# Patient Record
Sex: FEMALE | Race: WHITE | NOT HISPANIC OR LATINO | Employment: FULL TIME | URBAN - METROPOLITAN AREA
[De-identification: names, ages, dates, MRNs, and addresses within clinical notes are randomized per-mention and may not be internally consistent; named-entity substitution may affect disease eponyms.]

---

## 2017-01-01 DIAGNOSIS — F41.8 DEPRESSION WITH ANXIETY: ICD-10-CM

## 2017-01-03 RX ORDER — DULOXETIN HYDROCHLORIDE 60 MG/1
CAPSULE, DELAYED RELEASE ORAL
Qty: 30 CAPSULE | Refills: 0 | OUTPATIENT
Start: 2017-01-03

## 2017-01-03 RX ORDER — MONTELUKAST SODIUM 10 MG/1
TABLET ORAL
Qty: 30 TABLET | Refills: 0 | OUTPATIENT
Start: 2017-01-03

## 2017-02-06 DIAGNOSIS — F41.8 DEPRESSION WITH ANXIETY: ICD-10-CM

## 2017-02-06 RX ORDER — DULOXETIN HYDROCHLORIDE 60 MG/1
CAPSULE, DELAYED RELEASE ORAL
Qty: 30 CAPSULE | Refills: 0 | OUTPATIENT
Start: 2017-02-06

## 2017-02-07 ENCOUNTER — TELEPHONE (OUTPATIENT)
Dept: INTERNAL MEDICINE | Facility: CLINIC | Age: 47
End: 2017-02-07

## 2017-02-07 DIAGNOSIS — F41.8 DEPRESSION WITH ANXIETY: ICD-10-CM

## 2017-02-07 RX ORDER — DULOXETIN HYDROCHLORIDE 60 MG/1
60 CAPSULE, DELAYED RELEASE ORAL DAILY
Qty: 2 CAPSULE | Refills: 0 | Status: SHIPPED | OUTPATIENT
Start: 2017-02-07 | End: 2019-03-06 | Stop reason: SDUPTHER

## 2017-02-07 NOTE — TELEPHONE ENCOUNTER
----- Message from Whitney Laurent sent at 2/7/2017  2:50 PM EST -----  Regarding: REFILL  HAS AN APPT ON 2/9. SHE NEEDS A REFILL ON HER CYMBALTA 60MG UNTIL SHE CAN GET IN HERE FOR HER APPT. PHARMACY [address]

## 2018-01-23 DIAGNOSIS — F41.8 DEPRESSION WITH ANXIETY: ICD-10-CM

## 2018-01-24 RX ORDER — DULOXETIN HYDROCHLORIDE 60 MG/1
CAPSULE, DELAYED RELEASE ORAL
Qty: 30 CAPSULE | Refills: 0 | OUTPATIENT
Start: 2018-01-24

## 2019-03-06 ENCOUNTER — OFFICE VISIT (OUTPATIENT)
Dept: FAMILY MEDICINE CLINIC | Facility: CLINIC | Age: 49
End: 2019-03-06

## 2019-03-06 VITALS
SYSTOLIC BLOOD PRESSURE: 110 MMHG | HEART RATE: 84 BPM | BODY MASS INDEX: 37.93 KG/M2 | OXYGEN SATURATION: 97 % | RESPIRATION RATE: 18 BRPM | DIASTOLIC BLOOD PRESSURE: 70 MMHG | WEIGHT: 221 LBS | TEMPERATURE: 98.4 F

## 2019-03-06 DIAGNOSIS — J45.20 MILD INTERMITTENT ASTHMA WITHOUT COMPLICATION: Primary | ICD-10-CM

## 2019-03-06 DIAGNOSIS — N92.6 IRREGULAR MENSES: ICD-10-CM

## 2019-03-06 DIAGNOSIS — G56.22 NEURITIS OF LEFT ULNAR NERVE: ICD-10-CM

## 2019-03-06 DIAGNOSIS — Z23 NEED FOR 23-POLYVALENT PNEUMOCOCCAL POLYSACCHARIDE VACCINE: ICD-10-CM

## 2019-03-06 DIAGNOSIS — F41.8 DEPRESSION WITH ANXIETY: ICD-10-CM

## 2019-03-06 DIAGNOSIS — M23.352 INTERNAL DERANGEMENT OF LEFT KNEE INVOLVING POSTERIOR HORN OF LATERAL MENISCUS: ICD-10-CM

## 2019-03-06 DIAGNOSIS — Z12.39 BREAST CANCER SCREENING: ICD-10-CM

## 2019-03-06 DIAGNOSIS — F41.8 MIXED ANXIETY DEPRESSIVE DISORDER: ICD-10-CM

## 2019-03-06 DIAGNOSIS — F07.81 POST CONCUSSION SYNDROME: ICD-10-CM

## 2019-03-06 DIAGNOSIS — Z23 NEED FOR DIPHTHERIA-TETANUS-PERTUSSIS (TDAP) VACCINE: ICD-10-CM

## 2019-03-06 PROCEDURE — 90732 PPSV23 VACC 2 YRS+ SUBQ/IM: CPT | Performed by: FAMILY MEDICINE

## 2019-03-06 PROCEDURE — 36415 COLL VENOUS BLD VENIPUNCTURE: CPT | Performed by: FAMILY MEDICINE

## 2019-03-06 PROCEDURE — 90471 IMMUNIZATION ADMIN: CPT | Performed by: FAMILY MEDICINE

## 2019-03-06 PROCEDURE — 90472 IMMUNIZATION ADMIN EACH ADD: CPT | Performed by: FAMILY MEDICINE

## 2019-03-06 PROCEDURE — 90715 TDAP VACCINE 7 YRS/> IM: CPT | Performed by: FAMILY MEDICINE

## 2019-03-06 PROCEDURE — 99214 OFFICE O/P EST MOD 30 MIN: CPT | Performed by: FAMILY MEDICINE

## 2019-03-06 RX ORDER — ALBUTEROL SULFATE 90 UG/1
AEROSOL, METERED RESPIRATORY (INHALATION)
Qty: 1 INHALER | Refills: 11 | Status: SHIPPED | OUTPATIENT
Start: 2019-03-06 | End: 2020-03-20

## 2019-03-06 RX ORDER — FLUTICASONE PROPIONATE 50 MCG
1 SPRAY, SUSPENSION (ML) NASAL DAILY
Qty: 18.2 ML | Refills: 11 | Status: SHIPPED | OUTPATIENT
Start: 2019-03-06

## 2019-03-06 RX ORDER — DULOXETIN HYDROCHLORIDE 60 MG/1
60 CAPSULE, DELAYED RELEASE ORAL DAILY
Qty: 2 CAPSULE | Refills: 0 | Status: SHIPPED | OUTPATIENT
Start: 2019-03-06 | End: 2019-07-31 | Stop reason: SDUPTHER

## 2019-03-06 RX ORDER — MONTELUKAST SODIUM 10 MG/1
10 TABLET ORAL NIGHTLY
Qty: 90 TABLET | Refills: 3 | Status: SHIPPED | OUTPATIENT
Start: 2019-03-06 | End: 2020-08-15

## 2019-03-06 NOTE — PATIENT INSTRUCTIONS
Pneumococcal Polysaccharide Vaccine: What You Need to Know  1. Why get vaccinated?  Vaccination can protect older adults (and some children and younger adults) from pneumococcal disease.  Pneumococcal disease is caused by bacteria that can spread from person to person through close contact. It can cause ear infections, and it can also lead to more serious infections of the:  · Lungs (pneumonia),  · Blood (bacteremia), and  · Covering of the brain and spinal cord (meningitis). Meningitis can cause deafness and brain damage, and it can be fatal.    Anyone can get pneumococcal disease, but children under 2 years of age, people with certain medical conditions, adults over 65 years of age, and cigarette smokers are at the highest risk.  About 18,000 older adults die each year from pneumococcal disease in the [place].  Treatment of pneumococcal infections with penicillin and other drugs used to be more effective. But some strains of the disease have become resistant to these drugs. This makes prevention of the disease, through vaccination, even more important.  2. Pneumococcal polysaccharide vaccine (PPSV23)  Pneumococcal polysaccharide vaccine (PPSV23) protects against 23 types of pneumococcal bacteria. It will not prevent all pneumococcal disease.  PPSV23 is recommended for:  · All adults 65 years of age and older,  · Anyone 2 through 64 years of age with certain long-term health problems,  · Anyone 2 through 64 years of age with a weakened immune system,  · Adults 19 through 64 years of age who smoke cigarettes or have asthma.    Most people need only one dose of PPSV. A second dose is recommended for certain high-risk groups. People 65 and older should get a dose even if they have gotten one or more doses of the vaccine before they turned 65.  Your healthcare provider can give you more information about these recommendations.  Most healthy adults develop protection within 2 to 3 weeks of getting the shot.  3.  Some people should not get this vaccine  · Anyone who has had a life-threatening allergic reaction to PPSV should not get another dose.  · Anyone who has a severe allergy to any component of PPSV should not receive it. Tell your provider if you have any severe allergies.  · Anyone who is moderately or severely ill when the shot is scheduled may be asked to wait until they recover before getting the vaccine. Someone with a mild illness can usually be vaccinated.  · Children less than 2 years of age should not receive this vaccine.  · There is no evidence that PPSV is harmful to either a pregnant woman or to her fetus. However, as a precaution, women who need the vaccine should be vaccinated before becoming pregnant, if possible.  4. Risks of a vaccine reaction  With any medicine, including vaccines, there is a chance of side effects. These are usually mild and go away on their own, but serious reactions are also possible.  About half of people who get PPSV have mild side effects, such as redness or pain where the shot is given, which go away within about two days.  Less than 1 out of 100 people develop a fever, muscle aches, or more severe local reactions.  Problems that could happen after any vaccine:  · People sometimes faint after a medical procedure, including vaccination. Sitting or lying down for about 15 minutes can help prevent fainting, and injuries caused by a fall. Tell your doctor if you feel dizzy, or have vision changes or ringing in the ears.  · Some people get severe pain in the shoulder and have difficulty moving the arm where a shot was given. This happens very rarely.  · Any medication can cause a severe allergic reaction. Such reactions from a vaccine are very rare, estimated at about 1 in a million doses, and would happen within a few minutes to a few hours after the vaccination.  As with any medicine, there is a very remote chance of a vaccine causing a serious injury or death.  The safety of  vaccines is always being monitored. For more information, visit: www.cdc.gov/vaccinesafety/  5. What if there is a serious reaction?  What should I look for?  Look for anything that concerns you, such as signs of a severe allergic reaction, very high fever, or unusual behavior.  Signs of a severe allergic reaction can include hives, swelling of the face and throat, difficulty breathing, a fast heartbeat, dizziness, and weakness. These would usually start a few minutes to a few hours after the vaccination.  What should I do?  If you think it is a severe allergic reaction or other emergency that can't wait, call 9-1-1 or get to the nearest hospital. Otherwise, call your doctor.  Afterward, the reaction should be reported to the Vaccine Adverse Event Reporting System (VAERS). Your doctor might file this report, or you can do it yourself through the VAERS web site at www.vaers.[employer].gov, or by calling 1-822.364.3446.  [facility] does not give medical advice.  6. How can I learn more?  · Ask your doctor. He or she can give you the vaccine package insert or suggest other sources of information.  · Call your local or state health department.  · Contact the Centers for Disease Control and Prevention (CDC):  ? Call 1-308.826.5695 (4-326-RDY-INFO) or  ? Visit CDC's website at www.cdc.gov/vaccines  CDC Pneumococcal Polysaccharide Vaccine VIS (4/24/15)  This information is not intended to replace advice given to you by your health care provider. Make sure you discuss any questions you have with your health care provider.  Document Released: 10/15/2007 Document Revised: 09/07/2017 Document Reviewed: 09/07/2017  Elsevier Interactive Patient Education © 2017 [employer] Inc.
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
## 2019-03-22 ENCOUNTER — OFFICE VISIT (OUTPATIENT)
Dept: ORTHOPEDIC SURGERY | Facility: CLINIC | Age: 49
End: 2019-03-22

## 2019-03-22 VITALS — OXYGEN SATURATION: 98 % | BODY MASS INDEX: 36.96 KG/M2 | HEIGHT: 64 IN | WEIGHT: 216.49 LBS | HEART RATE: 70 BPM

## 2019-03-22 DIAGNOSIS — M17.12 PRIMARY OSTEOARTHRITIS OF LEFT KNEE: Primary | ICD-10-CM

## 2019-03-22 DIAGNOSIS — M25.562 LEFT KNEE PAIN, UNSPECIFIED CHRONICITY: ICD-10-CM

## 2019-03-22 PROCEDURE — 99243 OFF/OP CNSLTJ NEW/EST LOW 30: CPT | Performed by: ORTHOPAEDIC SURGERY
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
## 2019-04-09 ENCOUNTER — TRANSCRIBE ORDERS (OUTPATIENT)
Dept: ADMINISTRATIVE | Facility: HOSPITAL | Age: 49
End: 2019-04-09

## 2019-04-09 ENCOUNTER — TELEPHONE (OUTPATIENT)
Dept: FAMILY MEDICINE CLINIC | Facility: CLINIC | Age: 49
End: 2019-04-09

## 2019-04-09 NOTE — TELEPHONE ENCOUNTER
----- Message from Aracely Capone sent at 4/9/2019  8:45 AM EDT -----  Regarding: REFERRAL  Contact: [phone]  PT WOULD LIKE TO BE REFERRED [facility]. ALSO CALL HER TO DISCUSS A PIECE OF GLASS SHE HAS STUCK IN HER THUMB

## 2019-04-15 ENCOUNTER — OFFICE VISIT (OUTPATIENT)
Dept: FAMILY MEDICINE CLINIC | Facility: CLINIC | Age: 49
End: 2019-04-15

## 2019-04-15 VITALS
SYSTOLIC BLOOD PRESSURE: 136 MMHG | DIASTOLIC BLOOD PRESSURE: 84 MMHG | WEIGHT: 222.4 LBS | BODY MASS INDEX: 37.97 KG/M2 | HEIGHT: 64 IN | OXYGEN SATURATION: 98 % | HEART RATE: 85 BPM | TEMPERATURE: 97.6 F

## 2019-04-15 DIAGNOSIS — S60.559S: Primary | ICD-10-CM

## 2019-04-15 PROCEDURE — 99212 OFFICE O/P EST SF 10 MIN: CPT | Performed by: FAMILY MEDICINE
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
## 2019-04-17 ENCOUNTER — OFFICE VISIT (OUTPATIENT)
Dept: NEUROLOGY | Facility: CLINIC | Age: 49
End: 2019-04-17

## 2019-04-17 ENCOUNTER — LAB (OUTPATIENT)
Dept: LAB | Facility: HOSPITAL | Age: 49
End: 2019-04-17

## 2019-04-17 VITALS
HEART RATE: 82 BPM | BODY MASS INDEX: 37.9 KG/M2 | SYSTOLIC BLOOD PRESSURE: 147 MMHG | WEIGHT: 222 LBS | HEIGHT: 64 IN | DIASTOLIC BLOOD PRESSURE: 90 MMHG

## 2019-04-17 DIAGNOSIS — R41.3 MEMORY LOSS: Primary | ICD-10-CM

## 2019-04-17 DIAGNOSIS — N92.6 IRREGULAR MENSES: ICD-10-CM

## 2019-04-17 LAB
ALBUMIN SERPL-MCNC: 4.3 G/DL (ref 3.5–5.2)
ALBUMIN/GLOB SERPL: 1.5 G/DL
ALP SERPL-CCNC: 63 U/L (ref 39–117)
ALT SERPL W P-5'-P-CCNC: 19 U/L (ref 1–33)
AMMONIA BLD-SCNC: 19 UMOL/L (ref 11–51)
ANION GAP SERPL CALCULATED.3IONS-SCNC: 11.4 MMOL/L
AST SERPL-CCNC: 20 U/L (ref 1–32)
BASOPHILS # BLD AUTO: 0.07 10*3/MM3 (ref 0–0.2)
BASOPHILS NFR BLD AUTO: 1.2 % (ref 0–1.5)
BILIRUB SERPL-MCNC: 0.2 MG/DL (ref 0.2–1.2)
BUN BLD-MCNC: 16 MG/DL (ref 6–20)
BUN/CREAT SERPL: 21.6 (ref 7–25)
CALCIUM SPEC-SCNC: 9.1 MG/DL (ref 8.6–10.5)
CHLORIDE SERPL-SCNC: 100 MMOL/L (ref 98–107)
CO2 SERPL-SCNC: 26.6 MMOL/L (ref 22–29)
CREAT BLD-MCNC: 0.74 MG/DL (ref 0.57–1)
DEPRECATED RDW RBC AUTO: 41.1 FL (ref 37–54)
EOSINOPHIL # BLD AUTO: 0.25 10*3/MM3 (ref 0–0.4)
EOSINOPHIL NFR BLD AUTO: 4.1 % (ref 0.3–6.2)
ERYTHROCYTE [DISTWIDTH] IN BLOOD BY AUTOMATED COUNT: 12.4 % (ref 12.3–15.4)
FOLATE SERPL-MCNC: 11.6 NG/ML (ref 4.78–24.2)
FSH SERPL-ACNC: 8.72 MIU/ML
GFR SERPL CREATININE-BSD FRML MDRD: 84 ML/MIN/1.73
GLOBULIN UR ELPH-MCNC: 2.9 GM/DL
GLUCOSE BLD-MCNC: 99 MG/DL (ref 65–99)
HCT VFR BLD AUTO: 39.2 % (ref 34–46.6)
HGB BLD-MCNC: 12.4 G/DL (ref 12–15.9)
IMM GRANULOCYTES # BLD AUTO: 0.02 10*3/MM3 (ref 0–0.05)
IMM GRANULOCYTES NFR BLD AUTO: 0.3 % (ref 0–0.5)
LYMPHOCYTES # BLD AUTO: 2.3 10*3/MM3 (ref 0.7–3.1)
LYMPHOCYTES NFR BLD AUTO: 38 % (ref 19.6–45.3)
MCH RBC QN AUTO: 29.2 PG (ref 26.6–33)
MCHC RBC AUTO-ENTMCNC: 31.6 G/DL (ref 31.5–35.7)
MCV RBC AUTO: 92.2 FL (ref 79–97)
MONOCYTES # BLD AUTO: 0.42 10*3/MM3 (ref 0.1–0.9)
MONOCYTES NFR BLD AUTO: 6.9 % (ref 5–12)
NEUTROPHILS # BLD AUTO: 2.99 10*3/MM3 (ref 1.4–7)
NEUTROPHILS NFR BLD AUTO: 49.5 % (ref 42.7–76)
NRBC BLD AUTO-RTO: 0 /100 WBC (ref 0–0)
PLATELET # BLD AUTO: 333 10*3/MM3 (ref 140–450)
PMV BLD AUTO: 10.5 FL (ref 6–12)
POTASSIUM BLD-SCNC: 5.1 MMOL/L (ref 3.5–5.2)
PROT SERPL-MCNC: 7.2 G/DL (ref 6–8.5)
RBC # BLD AUTO: 4.25 10*6/MM3 (ref 3.77–5.28)
SODIUM BLD-SCNC: 138 MMOL/L (ref 136–145)
TSH SERPL DL<=0.05 MIU/L-ACNC: 1.4 MIU/ML (ref 0.27–4.2)
VIT B12 BLD-MCNC: 233 PG/ML (ref 211–946)
WBC NRBC COR # BLD: 6.05 10*3/MM3 (ref 3.4–10.8)

## 2019-04-17 PROCEDURE — 82607 VITAMIN B-12: CPT | Performed by: PHYSICIAN ASSISTANT

## 2019-04-17 PROCEDURE — 86592 SYPHILIS TEST NON-TREP QUAL: CPT | Performed by: PHYSICIAN ASSISTANT

## 2019-04-17 PROCEDURE — 85025 COMPLETE CBC W/AUTO DIFF WBC: CPT | Performed by: PHYSICIAN ASSISTANT

## 2019-04-17 PROCEDURE — 99204 OFFICE O/P NEW MOD 45 MIN: CPT | Performed by: PHYSICIAN ASSISTANT

## 2019-04-17 PROCEDURE — 84443 ASSAY THYROID STIM HORMONE: CPT | Performed by: PHYSICIAN ASSISTANT

## 2019-04-17 PROCEDURE — 36415 COLL VENOUS BLD VENIPUNCTURE: CPT | Performed by: PHYSICIAN ASSISTANT

## 2019-04-17 PROCEDURE — 80053 COMPREHEN METABOLIC PANEL: CPT | Performed by: PHYSICIAN ASSISTANT

## 2019-04-17 PROCEDURE — 82140 ASSAY OF AMMONIA: CPT | Performed by: PHYSICIAN ASSISTANT

## 2019-04-17 PROCEDURE — 87798 DETECT AGENT NOS DNA AMP: CPT | Performed by: PHYSICIAN ASSISTANT

## 2019-04-17 PROCEDURE — 83001 ASSAY OF GONADOTROPIN (FSH): CPT

## 2019-04-17 PROCEDURE — 82746 ASSAY OF FOLIC ACID SERUM: CPT | Performed by: PHYSICIAN ASSISTANT
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
## 2019-04-18 LAB — RPR SER QL: NORMAL

## 2019-04-19 ENCOUNTER — TELEPHONE (OUTPATIENT)
Dept: NEUROLOGY | Facility: CLINIC | Age: 49
End: 2019-04-19

## 2019-04-19 NOTE — TELEPHONE ENCOUNTER
Ernestina Garcia PA-C Dieruf, Stephanie S, MA             Would you care to let Ms. Landrum know that I reviewed her lab work. Her B12 is on the low side of normal, therefore we have forwarded the results to her PCP for further evaluation as an oral supplement may be reasonable to try. Otherwise, I do not see any significant abnormalities.     Would you care to fax the results to her PCP? Thanks!

## 2019-04-19 NOTE — TELEPHONE ENCOUNTER
Left VM for Isabel to return call to clinic regarding her results.    Have successfully routed/faxed this over to  for further review/Tx:    Rosalie Anton MD PCP - General Family Medicine 03/22/2019 End  3/22/19   Phone: [phone]; Fax: [phone]

## 2019-04-22 LAB
T WHIPPLEI BY PCR, SOURCE: NORMAL
TROPHERYMA WHIPPLEI PCR: NOT DETECTED

## 2019-04-23 ENCOUNTER — TELEPHONE (OUTPATIENT)
Dept: NEUROLOGY | Facility: CLINIC | Age: 49
End: 2019-04-23

## 2019-04-23 NOTE — TELEPHONE ENCOUNTER
Called Isabel after 2:30pm as requested and relayed the results to her (Previous encounter):    Would you care to let Ms. Landrum know that I reviewed her lab work. Her B12 is on the low side of normal, therefore we have forwarded the results to her PCP for further evaluation as an oral supplement may be reasonable to try. Otherwise, I do not see any significant abnormalities.     Would you care to fax the results to her PCP? Thanks!     She stated verbal understanding and as these have been routed to her PCP will look to them for Tx. Also inquired about her thyroid and was notified TSH was normal.

## 2019-05-28 ENCOUNTER — OFFICE VISIT (OUTPATIENT)
Dept: ORTHOPEDIC SURGERY | Facility: CLINIC | Age: 49
End: 2019-05-28

## 2019-05-28 VITALS — HEIGHT: 64 IN | HEART RATE: 88 BPM | BODY MASS INDEX: 37.64 KG/M2 | WEIGHT: 220.46 LBS | OXYGEN SATURATION: 99 %

## 2019-05-28 DIAGNOSIS — S60.351A FOREIGN BODY OF RIGHT THUMB, INITIAL ENCOUNTER: Primary | ICD-10-CM

## 2019-05-28 PROCEDURE — 99204 OFFICE O/P NEW MOD 45 MIN: CPT | Performed by: ORTHOPAEDIC SURGERY
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
## 2019-06-12 ENCOUNTER — HOSPITAL ENCOUNTER (OUTPATIENT)
Dept: MAMMOGRAPHY | Facility: HOSPITAL | Age: 49
Discharge: HOME | End: 2019-06-12
Admitting: FAMILY MEDICINE

## 2019-06-12 DIAGNOSIS — Z12.39 BREAST CANCER SCREENING: ICD-10-CM

## 2019-06-12 PROCEDURE — 77063 BREAST TOMOSYNTHESIS BI: CPT

## 2019-06-12 PROCEDURE — 77063 BREAST TOMOSYNTHESIS BI: CPT | Performed by: RADIOLOGY

## 2019-06-12 PROCEDURE — 77067 SCR MAMMO BI INCL CAD: CPT | Performed by: RADIOLOGY

## 2019-06-12 PROCEDURE — 77067 SCR MAMMO BI INCL CAD: CPT

## 2019-07-31 DIAGNOSIS — F41.8 DEPRESSION WITH ANXIETY: ICD-10-CM

## 2019-08-01 RX ORDER — DULOXETIN HYDROCHLORIDE 60 MG/1
60 CAPSULE, DELAYED RELEASE ORAL DAILY
Qty: 7 CAPSULE | Refills: 0 | Status: SHIPPED | OUTPATIENT
Start: 2019-08-01 | End: 2019-08-07 | Stop reason: SDUPTHER

## 2019-08-07 ENCOUNTER — OFFICE VISIT (OUTPATIENT)
Dept: FAMILY MEDICINE CLINIC | Facility: CLINIC | Age: 49
End: 2019-08-07

## 2019-08-07 VITALS
HEART RATE: 75 BPM | DIASTOLIC BLOOD PRESSURE: 88 MMHG | BODY MASS INDEX: 38.41 KG/M2 | SYSTOLIC BLOOD PRESSURE: 122 MMHG | OXYGEN SATURATION: 98 % | HEIGHT: 64 IN | WEIGHT: 225 LBS

## 2019-08-07 DIAGNOSIS — F41.8 DEPRESSION WITH ANXIETY: Primary | ICD-10-CM

## 2019-08-07 PROCEDURE — 99213 OFFICE O/P EST LOW 20 MIN: CPT | Performed by: FAMILY MEDICINE

## 2019-08-07 RX ORDER — DULOXETIN HYDROCHLORIDE 60 MG/1
60 CAPSULE, DELAYED RELEASE ORAL DAILY
Qty: 90 CAPSULE | Refills: 3 | Status: SHIPPED | OUTPATIENT
Start: 2019-08-07 | End: 2020-08-07

## 2019-08-07 NOTE — PROGRESS NOTES
"Subjective      Isabel Landrum is a 48 y.o. female.     Pt is here to fu on depression. Pt states she is doing good. Complains of fatigue that started couple years ago. Refill needed on DULOXETINE.    History of Present Illness she has been on Cymbalta for the last couple years she is tolerating the medication well no side effects she is sleeping well she is currently moving and has had a stressful summer she has not ran out of the medication.  No suicidal or homicidal ideation.  He has an appointment scheduled for physical.    The following portions of the patient's history were reviewed and updated as appropriate: allergies, current medications, past family history, past medical history, past social history, past surgical history and problem list.    Review of Systems   Constitutional: Negative.    HENT: Negative.    Eyes: Negative.    Respiratory: Negative.    Cardiovascular: Negative.    Gastrointestinal: Negative.    Endocrine: Negative.    Genitourinary: Negative.    Musculoskeletal: Negative.    Skin: Negative.    Allergic/Immunologic: Negative.    Neurological: Negative.    Hematological: Negative.    Psychiatric/Behavioral: Negative.    All other systems reviewed and are negative.      Objective     Vitals:    08/07/19 1605   BP: 122/88   Pulse: 75   SpO2: 98%   Weight: 102 kg (225 lb)   Height: 162.6 cm (64\")       Physical Exam   Constitutional: She appears well-developed and well-nourished.   Cardiovascular: Normal rate and regular rhythm.   Pulmonary/Chest: Effort normal and breath sounds normal.   Psychiatric: She has a normal mood and affect. Her behavior is normal. Thought content normal.   Nursing note and vitals reviewed.      Assessment/Plan     Problem List Items Addressed This Visit        Other    Depression with anxiety - Primary    Relevant Medications    DULoxetine (CYMBALTA) 60 MG capsule          "

## 2019-09-17 ENCOUNTER — TELEPHONE (OUTPATIENT)
Dept: ORTHOPEDIC SURGERY | Facility: CLINIC | Age: 49
End: 2019-09-17

## 2019-09-17 NOTE — TELEPHONE ENCOUNTER
Attempted to contact patient again at [phone] to reschedule her No Show Post Op appointment. Unable to LVM due to a full voicemail box.

## 2020-03-19 DIAGNOSIS — J45.20 MILD INTERMITTENT ASTHMA WITHOUT COMPLICATION: ICD-10-CM

## 2020-03-20 RX ORDER — ALBUTEROL SULFATE 90 UG/1
AEROSOL, METERED RESPIRATORY (INHALATION)
Qty: 18 G | Refills: 0 | Status: SHIPPED | OUTPATIENT
Start: 2020-03-20 | End: 2021-05-19

## 2020-08-07 DIAGNOSIS — F41.8 DEPRESSION WITH ANXIETY: ICD-10-CM

## 2020-08-07 RX ORDER — DULOXETIN HYDROCHLORIDE 60 MG/1
60 CAPSULE, DELAYED RELEASE ORAL DAILY
Qty: 90 CAPSULE | Refills: 3 | Status: SHIPPED | OUTPATIENT
Start: 2020-08-07 | End: 2021-08-28

## 2020-08-15 DIAGNOSIS — J45.20 MILD INTERMITTENT ASTHMA WITHOUT COMPLICATION: ICD-10-CM

## 2020-08-15 RX ORDER — MONTELUKAST SODIUM 10 MG/1
TABLET ORAL
Qty: 90 TABLET | OUTPATIENT
Start: 2020-08-15

## 2020-08-15 RX ORDER — MONTELUKAST SODIUM 10 MG/1
TABLET ORAL
Qty: 30 TABLET | Refills: 0 | Status: SHIPPED | OUTPATIENT
Start: 2020-08-15 | End: 2020-09-14

## 2020-09-10 DIAGNOSIS — J45.20 MILD INTERMITTENT ASTHMA WITHOUT COMPLICATION: ICD-10-CM

## 2020-09-14 RX ORDER — MONTELUKAST SODIUM 10 MG/1
TABLET ORAL
Qty: 30 TABLET | Refills: 0 | Status: SHIPPED | OUTPATIENT
Start: 2020-09-14 | End: 2020-10-19

## 2020-10-15 DIAGNOSIS — J45.20 MILD INTERMITTENT ASTHMA WITHOUT COMPLICATION: ICD-10-CM

## 2020-10-19 RX ORDER — MONTELUKAST SODIUM 10 MG/1
TABLET ORAL
Qty: 90 TABLET | Refills: 3 | Status: SHIPPED | OUTPATIENT
Start: 2020-10-19 | End: 2021-11-05

## 2020-10-20 ENCOUNTER — OFFICE VISIT (OUTPATIENT)
Dept: FAMILY MEDICINE CLINIC | Facility: CLINIC | Age: 50
End: 2020-10-20

## 2020-10-20 VITALS
HEART RATE: 80 BPM | RESPIRATION RATE: 13 BRPM | OXYGEN SATURATION: 98 % | SYSTOLIC BLOOD PRESSURE: 138 MMHG | WEIGHT: 211 LBS | DIASTOLIC BLOOD PRESSURE: 90 MMHG | HEIGHT: 64 IN | BODY MASS INDEX: 36.02 KG/M2 | TEMPERATURE: 97.8 F

## 2020-10-20 DIAGNOSIS — Z12.11 COLON CANCER SCREENING: ICD-10-CM

## 2020-10-20 DIAGNOSIS — J45.20 MILD INTERMITTENT ASTHMA WITHOUT COMPLICATION: ICD-10-CM

## 2020-10-20 DIAGNOSIS — L20.82 FLEXURAL ECZEMA: Primary | ICD-10-CM

## 2020-10-20 DIAGNOSIS — K42.9 UMBILICAL HERNIA WITHOUT OBSTRUCTION OR GANGRENE: ICD-10-CM

## 2020-10-20 DIAGNOSIS — Z23 NEEDS FLU SHOT: ICD-10-CM

## 2020-10-20 PROCEDURE — 90471 IMMUNIZATION ADMIN: CPT | Performed by: FAMILY MEDICINE

## 2020-10-20 PROCEDURE — 99213 OFFICE O/P EST LOW 20 MIN: CPT | Performed by: FAMILY MEDICINE

## 2020-10-20 PROCEDURE — 90686 IIV4 VACC NO PRSV 0.5 ML IM: CPT | Performed by: FAMILY MEDICINE

## 2020-10-20 RX ORDER — TRIAMCINOLONE ACETONIDE 5 MG/G
OINTMENT TOPICAL 2 TIMES DAILY
Qty: 15 G | Refills: 5 | Status: SHIPPED | OUTPATIENT
Start: 2020-10-20 | End: 2021-06-14

## 2020-10-20 NOTE — PROGRESS NOTES
"Subjective   Isabel Landrum is a 50 y.o. female.     History of Present Illness rash on bilateral elbows has tried otc hydrocortisone.  Raw and itchy.  Flaky.      She has been having some periumbilical pain.  She has had 10 years of buldge in belly button.  Painful leaning against counter.  Tender inside belly button.  Resolved 36 hours later.      The following portions of the patient's history were reviewed and updated as appropriate: allergies, current medications, past family history, past medical history, past social history, past surgical history and problem list.    Review of Systems   Constitutional: Negative.    HENT: Negative.    Eyes: Negative.    Respiratory: Negative.    Cardiovascular: Negative.    Gastrointestinal: Positive for abdominal pain. Negative for abdominal distention.   Endocrine: Negative.    Genitourinary: Negative.    Musculoskeletal: Negative.    Skin: Positive for color change and rash.   Allergic/Immunologic: Negative.    Neurological: Negative.    Hematological: Negative.    Psychiatric/Behavioral: Negative.    All other systems reviewed and are negative.      Objective     Vitals:    10/20/20 1031   BP: 138/90   Pulse: 80   Resp: 13   Temp: 97.8 °F (36.6 °C)   SpO2: 98%   Weight: 95.7 kg (211 lb)   Height: 162.6 cm (64\")       Physical Exam  Vitals signs and nursing note reviewed.   Constitutional:       Appearance: She is well-developed.   HENT:      Head: Normocephalic and atraumatic.   Eyes:      General:         Right eye: No discharge.         Left eye: No discharge.      Pupils: Pupils are equal, round, and reactive to light.   Neck:      Musculoskeletal: Normal range of motion and neck supple.   Cardiovascular:      Rate and Rhythm: Normal rate and regular rhythm.      Heart sounds: Normal heart sounds.   Pulmonary:      Effort: Pulmonary effort is normal.      Breath sounds: Normal breath sounds.   Abdominal:      General: Bowel sounds are normal.      Palpations: Abdomen is " soft. There is no mass.      Tenderness: There is no abdominal tenderness.      Comments: Umbilical hernia with some tenderness.  No erythema.  Normal bs.       Musculoskeletal: Normal range of motion.      Right shoulder: She exhibits no swelling.   Skin:     General: Skin is warm and dry.      Nails: There is no clubbing.     Neurological:      Mental Status: She is alert and oriented to person, place, and time.      Deep Tendon Reflexes: Reflexes are normal and symmetric.   Psychiatric:         Behavior: Behavior normal.         Thought Content: Thought content normal.         Judgment: Judgment normal.         Assessment/Plan     Problem List Items Addressed This Visit        Respiratory    Asthma       Musculoskeletal and Integument    Flexural eczema - Primary    Relevant Medications    triamcinolone (KENALOG) 0.5 % ointment       Other    Colon cancer screening    Relevant Orders    Ambulatory Referral For Screening Colonoscopy    Umbilical hernia without obstruction or gangrene    Relevant Orders    Ambulatory Referral to General Surgery    CT Abdomen Pelvis Without Contrast    Needs flu shot    Relevant Orders    Fluarix Quad >6 Months (2613-1746) (Completed)

## 2021-02-03 ENCOUNTER — TELEPHONE (OUTPATIENT)
Dept: GASTROENTEROLOGY | Facility: CLINIC | Age: 51
End: 2021-02-03

## 2021-02-03 NOTE — TELEPHONE ENCOUNTER
I called Ms Landrum back. Informed her that Dr Bower uses Propofol anesthesia. I advised her not to drive or go to any meetings after procedure. Need to wait after 24 hours to go back to normal activities. Patient voiced understanding.

## 2021-02-04 DIAGNOSIS — Z12.11 SCREENING FOR COLON CANCER: Primary | ICD-10-CM

## 2021-02-09 ENCOUNTER — APPOINTMENT (OUTPATIENT)
Dept: PREADMISSION TESTING | Facility: HOSPITAL | Age: 51
End: 2021-02-09

## 2021-02-09 PROCEDURE — C9803 HOPD COVID-19 SPEC COLLECT: HCPCS

## 2021-02-09 PROCEDURE — U0004 COV-19 TEST NON-CDC HGH THRU: HCPCS

## 2021-02-10 LAB — SARS-COV-2 RNA RESP QL NAA+PROBE: NOT DETECTED

## 2021-02-12 ENCOUNTER — OUTSIDE FACILITY SERVICE (OUTPATIENT)
Dept: GASTROENTEROLOGY | Facility: CLINIC | Age: 51
End: 2021-02-12

## 2021-02-12 PROCEDURE — 88305 TISSUE EXAM BY PATHOLOGIST: CPT | Performed by: INTERNAL MEDICINE

## 2021-02-12 PROCEDURE — 45380 COLONOSCOPY AND BIOPSY: CPT | Performed by: INTERNAL MEDICINE

## 2021-02-12 PROCEDURE — 45385 COLONOSCOPY W/LESION REMOVAL: CPT | Performed by: INTERNAL MEDICINE

## 2021-02-13 ENCOUNTER — LAB REQUISITION (OUTPATIENT)
Dept: LAB | Facility: HOSPITAL | Age: 51
End: 2021-02-13

## 2021-02-13 DIAGNOSIS — Z12.11 ENCOUNTER FOR SCREENING FOR MALIGNANT NEOPLASM OF COLON: ICD-10-CM

## 2021-02-16 LAB
CYTO UR: NORMAL
LAB AP CASE REPORT: NORMAL
LAB AP CLINICAL INFORMATION: NORMAL
PATH REPORT.FINAL DX SPEC: NORMAL
PATH REPORT.GROSS SPEC: NORMAL

## 2021-03-27 ENCOUNTER — LAB (OUTPATIENT)
Dept: LAB | Facility: HOSPITAL | Age: 51
End: 2021-03-27

## 2021-03-27 ENCOUNTER — TRANSCRIBE ORDERS (OUTPATIENT)
Dept: GENERAL RADIOLOGY | Facility: HOSPITAL | Age: 51
End: 2021-03-27

## 2021-03-27 ENCOUNTER — TRANSCRIBE ORDERS (OUTPATIENT)
Dept: LAB | Facility: HOSPITAL | Age: 51
End: 2021-03-27

## 2021-03-27 ENCOUNTER — HOSPITAL ENCOUNTER (OUTPATIENT)
Dept: GENERAL RADIOLOGY | Facility: HOSPITAL | Age: 51
Discharge: HOME | End: 2021-03-27

## 2021-03-27 DIAGNOSIS — A63.0 CONDYLOMA ACUMINATUM: Primary | ICD-10-CM

## 2021-03-27 DIAGNOSIS — Z41.9 SURGERY, ELECTIVE: Primary | ICD-10-CM

## 2021-03-27 DIAGNOSIS — A63.0 CONDYLOMA ACUMINATUM: ICD-10-CM

## 2021-03-27 LAB
ANION GAP SERPL CALCULATED.3IONS-SCNC: 11.3 MMOL/L (ref 5–15)
BASOPHILS # BLD AUTO: 0.06 10*3/MM3 (ref 0–0.2)
BASOPHILS NFR BLD AUTO: 0.8 % (ref 0–1.5)
BUN SERPL-MCNC: 13 MG/DL (ref 6–20)
BUN/CREAT SERPL: 18.3 (ref 7–25)
CALCIUM SPEC-SCNC: 9.8 MG/DL (ref 8.6–10.5)
CHLORIDE SERPL-SCNC: 101 MMOL/L (ref 98–107)
CO2 SERPL-SCNC: 25.7 MMOL/L (ref 22–29)
CREAT SERPL-MCNC: 0.71 MG/DL (ref 0.57–1)
DEPRECATED RDW RBC AUTO: 43.1 FL (ref 37–54)
EOSINOPHIL # BLD AUTO: 0.2 10*3/MM3 (ref 0–0.4)
EOSINOPHIL NFR BLD AUTO: 2.8 % (ref 0.3–6.2)
ERYTHROCYTE [DISTWIDTH] IN BLOOD BY AUTOMATED COUNT: 13.1 % (ref 12.3–15.4)
GFR SERPL CREATININE-BSD FRML MDRD: 87 ML/MIN/1.73
GLUCOSE SERPL-MCNC: 97 MG/DL (ref 65–99)
HCT VFR BLD AUTO: 38.1 % (ref 34–46.6)
HGB BLD-MCNC: 12.6 G/DL (ref 12–15.9)
HIV1+2 AB SER QL: NORMAL
IMM GRANULOCYTES # BLD AUTO: 0.02 10*3/MM3 (ref 0–0.05)
IMM GRANULOCYTES NFR BLD AUTO: 0.3 % (ref 0–0.5)
LYMPHOCYTES # BLD AUTO: 2.57 10*3/MM3 (ref 0.7–3.1)
LYMPHOCYTES NFR BLD AUTO: 35.7 % (ref 19.6–45.3)
MCH RBC QN AUTO: 29.6 PG (ref 26.6–33)
MCHC RBC AUTO-ENTMCNC: 33.1 G/DL (ref 31.5–35.7)
MCV RBC AUTO: 89.6 FL (ref 79–97)
MONOCYTES # BLD AUTO: 0.5 10*3/MM3 (ref 0.1–0.9)
MONOCYTES NFR BLD AUTO: 7 % (ref 5–12)
NEUTROPHILS NFR BLD AUTO: 3.84 10*3/MM3 (ref 1.7–7)
NEUTROPHILS NFR BLD AUTO: 53.4 % (ref 42.7–76)
NRBC BLD AUTO-RTO: 0 /100 WBC (ref 0–0.2)
PLATELET # BLD AUTO: 292 10*3/MM3 (ref 140–450)
PMV BLD AUTO: 10.2 FL (ref 6–12)
POTASSIUM SERPL-SCNC: 5.3 MMOL/L (ref 3.5–5.2)
RBC # BLD AUTO: 4.25 10*6/MM3 (ref 3.77–5.28)
SODIUM SERPL-SCNC: 138 MMOL/L (ref 136–145)
WBC # BLD AUTO: 7.19 10*3/MM3 (ref 3.4–10.8)

## 2021-03-27 PROCEDURE — 80048 BASIC METABOLIC PNL TOTAL CA: CPT

## 2021-03-27 PROCEDURE — 36415 COLL VENOUS BLD VENIPUNCTURE: CPT

## 2021-03-27 PROCEDURE — 85025 COMPLETE CBC W/AUTO DIFF WBC: CPT

## 2021-03-27 PROCEDURE — G0432 EIA HIV-1/HIV-2 SCREEN: HCPCS

## 2021-03-27 PROCEDURE — 71046 X-RAY EXAM CHEST 2 VIEWS: CPT

## 2021-03-29 ENCOUNTER — HOSPITAL ENCOUNTER (OUTPATIENT)
Dept: CARDIOLOGY | Facility: HOSPITAL | Age: 51
Discharge: HOME | End: 2021-03-29
Admitting: COLON & RECTAL SURGERY

## 2021-03-29 ENCOUNTER — TRANSCRIBE ORDERS (OUTPATIENT)
Dept: CARDIOLOGY | Facility: HOSPITAL | Age: 51
End: 2021-03-29

## 2021-03-29 DIAGNOSIS — A63.0 CONDYLOMA ACUMINATUM: Primary | ICD-10-CM

## 2021-03-29 DIAGNOSIS — A63.0 CONDYLOMA ACUMINATUM: ICD-10-CM

## 2021-03-29 LAB
QT INTERVAL: 418 MS
QTC INTERVAL: 460 MS

## 2021-03-29 PROCEDURE — 93010 ELECTROCARDIOGRAM REPORT: CPT | Performed by: INTERNAL MEDICINE

## 2021-03-29 PROCEDURE — 93005 ELECTROCARDIOGRAM TRACING: CPT | Performed by: COLON & RECTAL SURGERY

## 2021-03-30 ENCOUNTER — TELEPHONE (OUTPATIENT)
Dept: FAMILY MEDICINE CLINIC | Facility: CLINIC | Age: 51
End: 2021-03-30

## 2021-03-30 DIAGNOSIS — R06.83 SNORING: Primary | ICD-10-CM

## 2021-03-30 NOTE — TELEPHONE ENCOUNTER
Caller: Isabel Landrum    Relationship: Self    Best call back number: [phone]    What is the medical concern/diagnosis: STOPS BREATHING DURING SLEEP ACCORDING TO      What specialty or service is being requested: SLEEP STUDY     What is the provider, practice or medical service name: [facility]    What is the office location: [address] [place] [address]    What is the office phone number: [phone]    Any additional details: IF PCP HAS ANOTHER LOCATION IN MIND PATIENT WOULD LIKE TO KNOW.

## 2021-03-31 DIAGNOSIS — Z12.4 CERVICAL CANCER SCREENING: ICD-10-CM

## 2021-03-31 DIAGNOSIS — Z12.31 ENCOUNTER FOR SCREENING MAMMOGRAM FOR MALIGNANT NEOPLASM OF BREAST: Primary | ICD-10-CM

## 2021-04-05 RX ORDER — PROPRANOLOL HYDROCHLORIDE 10 MG/1
10 TABLET ORAL 2 TIMES DAILY
Qty: 120 TABLET | Refills: 3 | Status: SHIPPED | OUTPATIENT
Start: 2021-04-05 | End: 2021-04-06

## 2021-04-06 ENCOUNTER — TELEPHONE (OUTPATIENT)
Dept: GASTROENTEROLOGY | Facility: CLINIC | Age: 51
End: 2021-04-06

## 2021-04-06 NOTE — TELEPHONE ENCOUNTER
I spoke with Ms Landrum. The propranolol prescription was an error. She don't have to take or pick medication up from the pharmacy. It has been cancelled.

## 2021-04-06 NOTE — TELEPHONE ENCOUNTER
Dr Bower,  Ms Diallo called & left a message wanting to know why you prescribe the Propranolol. I didn't see a diagnosis code so I couldn't give her an answer. Please advise. Thanks

## 2021-05-19 DIAGNOSIS — J45.20 MILD INTERMITTENT ASTHMA WITHOUT COMPLICATION: ICD-10-CM

## 2021-05-19 RX ORDER — ALBUTEROL SULFATE 90 UG/1
AEROSOL, METERED RESPIRATORY (INHALATION)
Qty: 18 G | Refills: 0 | Status: SHIPPED | OUTPATIENT
Start: 2021-05-19 | End: 2021-08-09

## 2021-06-14 ENCOUNTER — OFFICE VISIT (OUTPATIENT)
Dept: FAMILY MEDICINE CLINIC | Facility: CLINIC | Age: 51
End: 2021-06-14

## 2021-06-14 VITALS
HEIGHT: 64 IN | DIASTOLIC BLOOD PRESSURE: 84 MMHG | HEART RATE: 85 BPM | WEIGHT: 223 LBS | BODY MASS INDEX: 38.07 KG/M2 | TEMPERATURE: 97.7 F | RESPIRATION RATE: 24 BRPM | SYSTOLIC BLOOD PRESSURE: 120 MMHG | OXYGEN SATURATION: 98 %

## 2021-06-14 DIAGNOSIS — Z12.31 ENCOUNTER FOR SCREENING MAMMOGRAM FOR MALIGNANT NEOPLASM OF BREAST: ICD-10-CM

## 2021-06-14 DIAGNOSIS — F90.9 ADULT ADHD (ATTENTION DEFICIT HYPERACTIVITY DISORDER): ICD-10-CM

## 2021-06-14 DIAGNOSIS — L81.9 ATYPICAL PIGMENTED SKIN LESION: ICD-10-CM

## 2021-06-14 DIAGNOSIS — Z00.00 ANNUAL PHYSICAL EXAM: Primary | ICD-10-CM

## 2021-06-14 LAB
ALBUMIN SERPL-MCNC: 4.3 G/DL (ref 3.5–5.2)
ALBUMIN/GLOB SERPL: 1.6 G/DL
ALP SERPL-CCNC: 73 U/L (ref 39–117)
ALT SERPL W P-5'-P-CCNC: 15 U/L (ref 1–33)
ANION GAP SERPL CALCULATED.3IONS-SCNC: 8.8 MMOL/L (ref 5–15)
AST SERPL-CCNC: 19 U/L (ref 1–32)
BASOPHILS # BLD AUTO: 0.08 10*3/MM3 (ref 0–0.2)
BASOPHILS NFR BLD AUTO: 1.4 % (ref 0–1.5)
BILIRUB SERPL-MCNC: 0.2 MG/DL (ref 0–1.2)
BUN SERPL-MCNC: 13 MG/DL (ref 6–20)
BUN/CREAT SERPL: 14.1 (ref 7–25)
CALCIUM SPEC-SCNC: 9.9 MG/DL (ref 8.6–10.5)
CHLORIDE SERPL-SCNC: 102 MMOL/L (ref 98–107)
CHOLEST SERPL-MCNC: 225 MG/DL (ref 0–200)
CO2 SERPL-SCNC: 27.2 MMOL/L (ref 22–29)
CREAT SERPL-MCNC: 0.92 MG/DL (ref 0.57–1)
DEPRECATED RDW RBC AUTO: 40.9 FL (ref 37–54)
EOSINOPHIL # BLD AUTO: 0.24 10*3/MM3 (ref 0–0.4)
EOSINOPHIL NFR BLD AUTO: 4.1 % (ref 0.3–6.2)
ERYTHROCYTE [DISTWIDTH] IN BLOOD BY AUTOMATED COUNT: 12.7 % (ref 12.3–15.4)
GFR SERPL CREATININE-BSD FRML MDRD: 65 ML/MIN/1.73
GLOBULIN UR ELPH-MCNC: 2.7 GM/DL
GLUCOSE SERPL-MCNC: 90 MG/DL (ref 65–99)
HBA1C MFR BLD: 5 % (ref 4.8–5.6)
HCT VFR BLD AUTO: 36.5 % (ref 34–46.6)
HCV AB SER DONR QL: NORMAL
HDLC SERPL-MCNC: 34 MG/DL (ref 40–60)
HGB BLD-MCNC: 12.6 G/DL (ref 12–15.9)
IMM GRANULOCYTES # BLD AUTO: 0.03 10*3/MM3 (ref 0–0.05)
IMM GRANULOCYTES NFR BLD AUTO: 0.5 % (ref 0–0.5)
LDLC SERPL CALC-MCNC: 101 MG/DL (ref 0–100)
LDLC/HDLC SERPL: 2.48 {RATIO}
LYMPHOCYTES # BLD AUTO: 2.46 10*3/MM3 (ref 0.7–3.1)
LYMPHOCYTES NFR BLD AUTO: 42.4 % (ref 19.6–45.3)
MCH RBC QN AUTO: 30.6 PG (ref 26.6–33)
MCHC RBC AUTO-ENTMCNC: 34.5 G/DL (ref 31.5–35.7)
MCV RBC AUTO: 88.6 FL (ref 79–97)
MONOCYTES # BLD AUTO: 0.44 10*3/MM3 (ref 0.1–0.9)
MONOCYTES NFR BLD AUTO: 7.6 % (ref 5–12)
NEUTROPHILS NFR BLD AUTO: 2.55 10*3/MM3 (ref 1.7–7)
NEUTROPHILS NFR BLD AUTO: 44 % (ref 42.7–76)
NRBC BLD AUTO-RTO: 0 /100 WBC (ref 0–0.2)
PLATELET # BLD AUTO: 313 10*3/MM3 (ref 140–450)
PMV BLD AUTO: 10 FL (ref 6–12)
POTASSIUM SERPL-SCNC: 5.8 MMOL/L (ref 3.5–5.2)
PROT SERPL-MCNC: 7 G/DL (ref 6–8.5)
RBC # BLD AUTO: 4.12 10*6/MM3 (ref 3.77–5.28)
SODIUM SERPL-SCNC: 138 MMOL/L (ref 136–145)
TRIGL SERPL-MCNC: 534 MG/DL (ref 0–150)
TSH SERPL DL<=0.05 MIU/L-ACNC: 1.12 UIU/ML (ref 0.27–4.2)
VLDLC SERPL-MCNC: 90 MG/DL (ref 5–40)
WBC # BLD AUTO: 5.8 10*3/MM3 (ref 3.4–10.8)

## 2021-06-14 PROCEDURE — 99396 PREV VISIT EST AGE 40-64: CPT | Performed by: FAMILY MEDICINE

## 2021-06-14 PROCEDURE — 86803 HEPATITIS C AB TEST: CPT | Performed by: FAMILY MEDICINE

## 2021-06-14 PROCEDURE — 85025 COMPLETE CBC W/AUTO DIFF WBC: CPT | Performed by: FAMILY MEDICINE

## 2021-06-14 PROCEDURE — 84443 ASSAY THYROID STIM HORMONE: CPT | Performed by: FAMILY MEDICINE

## 2021-06-14 PROCEDURE — 83036 HEMOGLOBIN GLYCOSYLATED A1C: CPT | Performed by: FAMILY MEDICINE

## 2021-06-14 PROCEDURE — 80061 LIPID PANEL: CPT | Performed by: FAMILY MEDICINE

## 2021-06-14 PROCEDURE — 80053 COMPREHEN METABOLIC PANEL: CPT | Performed by: FAMILY MEDICINE

## 2021-06-14 RX ORDER — ATOMOXETINE 25 MG/1
25 CAPSULE ORAL DAILY
Qty: 30 CAPSULE | Refills: 3 | Status: SHIPPED | OUTPATIENT
Start: 2021-06-14 | End: 2021-09-28

## 2021-06-14 NOTE — PROGRESS NOTES
Subjective   Isabel Landrum is a 50 y.o. female and is here for a comprehensive physical exam. The patient reports no problems. Patient reports last physical date of over 1 year.  She had c-scope high grade lesion.  Removed.  Repeat c-scope in 3 years.      Do you take any herbs or supplements that were not prescribed by a doctor? no  Are you taking calcium supplements? no  Are you taking aspirin daily? no  Family history of ovarian cancer? mat aunt  Family history of breast cancer? mat grand mother  FH of endometrial cancer? no  FH of cervical cancer? no  FH of colon cancer? no    Cancer Screening  Mammogram up-to-date?  yes  If yes, last exam date: scheduled in couple weeks.  BMD up-to-date? no  If yes, last exam date: na  Colonoscopy up-to-date? yes   If yes, last exam date: just had 2021  Pap up-to-date? yes   If yes, last exam date: has appt next month     History:  LMP: Patient's last menstrual period was 2021 (approximate).  Menopause at na years  Last pap date: 1 year ago  Abnormal pap? no  : 3  Para: 2    Immunization History  Tdap? 2019  HPV? will discuss with gyn  Pneumonia? not applicable  Shingles? not applicable    The following portions of the patient's history were reviewed and updated as appropriate: allergies, current medications, past family history, past medical history, past social history, past surgical history and problem list.    Past Medical History:   Diagnosis Date   • Acute sinusitis    • Allergic rhinitis    • Asthma    • Depression with anxiety    • GERD (gastroesophageal reflux disease)    • Headache        Family History   Problem Relation Age of Onset   • Cancer Other    • Heart disease Other    • Hyperlipidemia Other    • Hypertension Other    • Hyperlipidemia Mother    • Hypertension Father    • Heart attack Father    • Breast cancer Maternal Grandmother 58   • Ovarian cancer Maternal Aunt 55       Past Surgical History:   Procedure Laterality Date   • CARPAL  TUNNEL RELEASE     • OTHER SURGICAL HISTORY      Treatment of carpometacarpal fracture dislocation, thumb.       Social History     Socioeconomic History   • Marital status:      Spouse name: Not on file   • Number of children: Not on file   • Years of education: Not on file   • Highest education level: Not on file   Tobacco Use   • Smoking status: Never Smoker   • Smokeless tobacco: Never Used   Substance and Sexual Activity   • Alcohol use: Yes     Comment: occasional   • Drug use: No   • Sexual activity: Yes     Partners: Male     Birth control/protection: Condom       Review of Systems  Do you have pain that bothers you in your daily life? no  Review of Systems   Constitutional: Negative.    HENT: Negative.    Eyes: Negative.    Respiratory: Negative.    Cardiovascular: Negative.    Gastrointestinal: Negative.    Endocrine: Negative.    Genitourinary: Negative.    Musculoskeletal: Negative.    Skin: Negative.    Allergic/Immunologic: Negative.    Neurological: Negative.    Hematological: Negative.    Psychiatric/Behavioral: Negative.    All other systems reviewed and are negative.      Objective   Physical Exam  Vitals and nursing note reviewed.   Constitutional:       Appearance: She is well-developed.   HENT:      Head: Normocephalic and atraumatic.   Eyes:      General:         Right eye: No discharge.         Left eye: No discharge.      Pupils: Pupils are equal, round, and reactive to light.   Cardiovascular:      Rate and Rhythm: Normal rate and regular rhythm.      Heart sounds: Normal heart sounds.   Pulmonary:      Effort: Pulmonary effort is normal.      Breath sounds: Normal breath sounds.   Abdominal:      General: Bowel sounds are normal.      Palpations: Abdomen is soft. There is no mass.      Tenderness: There is no abdominal tenderness.   Musculoskeletal:         General: Normal range of motion.      Right shoulder: No swelling.      Cervical back: Normal range of motion and neck supple.    Skin:     General: Skin is warm and dry.      Nails: There is no clubbing.   Neurological:      Mental Status: She is alert and oriented to person, place, and time.      Deep Tendon Reflexes: Reflexes are normal and symmetric.   Psychiatric:         Behavior: Behavior normal.         Thought Content: Thought content normal.         Judgment: Judgment normal.          Assessment/Plan   Healthy female exam.      1.   Problem List Items Addressed This Visit        Genitourinary and Reproductive     Breast cancer screening    Relevant Orders    Mammo Screening Digital Tomosynthesis Bilateral With CAD       Health Encounters    Annual physical exam - Primary    Relevant Orders    CBC & Differential    TSH    Comprehensive Metabolic Panel    Hemoglobin A1c    Lipid Panel    Hepatitis C Antibody       Mental Health    Adult ADHD (attention deficit hyperactivity disorder)    Relevant Medications    atomoxetine (Strattera) 25 MG capsule       Skin    Atypical pigmented skin lesion    Relevant Orders    Ambulatory Referral to Dermatology            2. Patient Counseling:  --Nutrition: Stressed importance of moderation in sodium/caffeine intake, saturated fat and cholesterol, caloric balance, sufficient intake of fresh fruits, vegetables, fiber, calcium, iron, and 1 mg of folate supplement per day (for females capable of pregnancy).  --Discussed the issue of estrogen replacement, calcium supplement, and the daily use of baby aspirin.  --Exercise: Stressed the importance of regular exercise.   --Substance Abuse: Discussed cessation/primary prevention of tobacco, alcohol, or other drug use; driving or other dangerous activities under the influence; availability of treatment for abuse.    --Sexuality: Discussed sexually transmitted diseases, partner selection, use of condoms, avoidance of unintended pregnancy  and contraceptive alternatives.   --Injury prevention: Discussed safety belts, safety helmets, smoke detector, smoking  near bedding or upholstery.   --Dental health: Discussed importance of regular tooth brushing, flossing, and dental visits.  --Immunizations reviewed.  --Discussed benefits of screening colonoscopy.  --After hours service discussed with patient    3. Discussed the patient's BMI with her.  The BMI is above average; BMI management plan is completed  4. Follow up in one year

## 2021-06-16 ENCOUNTER — PRIOR AUTHORIZATION (OUTPATIENT)
Dept: FAMILY MEDICINE CLINIC | Facility: CLINIC | Age: 51
End: 2021-06-16

## 2021-06-21 DIAGNOSIS — E87.5 HYPERKALEMIA: Primary | ICD-10-CM

## 2021-06-21 NOTE — PROGRESS NOTES
Notify pt her labs look good.  Potassium is slightly elevated this is likely lab error will place order for recheck.  Cholesterol is elevated and triglycerides are high.  Recommend tricor to get triglycerides down.  If she is agreeable I will send that rx.

## 2021-06-22 ENCOUNTER — OFFICE VISIT (OUTPATIENT)
Dept: SLEEP MEDICINE | Facility: HOSPITAL | Age: 51
End: 2021-06-22

## 2021-06-22 VITALS
HEART RATE: 94 BPM | HEIGHT: 64 IN | SYSTOLIC BLOOD PRESSURE: 131 MMHG | OXYGEN SATURATION: 98 % | DIASTOLIC BLOOD PRESSURE: 76 MMHG | WEIGHT: 223 LBS | BODY MASS INDEX: 38.07 KG/M2

## 2021-06-22 DIAGNOSIS — R06.83 SNORING: ICD-10-CM

## 2021-06-22 DIAGNOSIS — E66.9 OBESITY (BMI 30-39.9): ICD-10-CM

## 2021-06-22 DIAGNOSIS — G47.10 HYPERSOMNOLENCE: Primary | ICD-10-CM

## 2021-06-22 DIAGNOSIS — G25.81 RESTLESS LEGS: ICD-10-CM

## 2021-06-22 PROCEDURE — 99204 OFFICE O/P NEW MOD 45 MIN: CPT | Performed by: INTERNAL MEDICINE

## 2021-06-22 NOTE — PROGRESS NOTES
New Sleep Patient Office Visit      Patient Name: Isabel Landrum    Referring Physician: Rosalie Anton MD    Chief Complaint:    Chief Complaint   Patient presents with   • Sleeping Problem       History of Present Illness: Isabel Landrum is a 50 y.o. female who is here today to establish care with Sleep Medicine.     50-year-old female with a history of asthma, anxiety/depression presenting for initial evaluation.  Patient states that she is having problems with snoring and excessive daytime sleepiness going on for a number of years.  6 months ago where she got remarried and her  tells her that she snores loudly and stops breathing and patient finding herself dozing off in the middle of the day and also having the need to take naps which can last anywhere from 1 to 2 hours at a time and still not feeling refreshed.  Patient states that she generally sleeps 6 to 7 hours a night.  Most of the time she is able to fall asleep quickly but sometimes takes about 60 minutes to go to sleep.  She will wake up 2-3 times during the night for unclear reasons and sometimes have difficulty going back to sleep.  She does with dry mouth in the morning.  Occasional headaches but she relates that to her migraine diagnosis.  Denies any orthopnea or PND symptoms.  Denies any leg edema.  Occasional reflux symptoms for which she takes over-the-counter Zantac.  She does not feel rested when she wakes up in the morning.  Drinks 4 to 5 cups of coffee in the morning.  Denies any caffeine intake late in the afternoon.  She has gained about 10 pounds of weight in the last year.  Has just started going back to the gym for walking and weightlifting.  Denies any driving problems or sleep-related accidents.  States that she has 30 minutes drive to work daily.    Patient denies any other parasomnias.  Denies any sleepwalking or sleep talking.  Does have restless leg symptoms in the middle of the night.  Also if she is on  a long airplane flights she has no symptoms.  States that restless legs do not bother her every day does not think it is impacting her sleep.  That will need monitoring.    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  States that her work is very busy and she ends up taking a nap during the daytime as well.    Denies any smoking.  Occasional alcohol intake.  No illicit drug use.  No other substance use.    Further sleep history is as below.    [place] Scale: 13/24    Estimated average amount of sleep per night: 6-7 h  Number of times  she wakes up at night: 1-2  Difficulty falling back asleep: occasionally  It usually takes 10-60 minutes to go to sleep.  She feels sleepy upon waking up: yes  Rotating or night shift work: no    Drowsiness/Sleepiness:  She exhibits the following:  excessive daytime sleepiness  excessive daytime fatigue  takes scheduled naps during the day    Snoring/Breathing:  She exhibits the following:  loud snoring  snores in all sleep positions  awoken with dry mouth    Reflux:  She describes the following:  occasional    Narcolepsy:  She exhibits the following:  none    RLS/PLMs:  She describes the following:  discomfort in legs with an urge to move them    Insomnia:  She describes the following:  problems initiating sleep at night  frequent awakenings  restless sleep    Parasomnia:  She exhibits the following:  none    Weight:  Weight change in the last year:  gain: 10 lbs    Subjective      Review of Systems:   Review of Systems   Constitutional: Positive for fatigue and unexpected weight gain.   HENT: Negative.    Respiratory: Positive for chest tightness and wheezing.    Cardiovascular: Negative.    Gastrointestinal: Negative.    Endocrine: Positive for heat intolerance.   Musculoskeletal: Negative.    Skin: Negative.    Allergic/Immunologic: Positive for environmental allergies.   Neurological: Positive for headache.   Hematological: Negative.    Psychiatric/Behavioral:  Positive for decreased concentration and sleep disturbance. The patient is nervous/anxious.    All other systems reviewed and are negative.      Past Medical History:   Past Medical History:   Diagnosis Date   • Acute sinusitis    • Allergic rhinitis    • Asthma    • Depression with anxiety    • GERD (gastroesophageal reflux disease)    • Headache        Past Surgical History:   Past Surgical History:   Procedure Laterality Date   • CARPAL TUNNEL RELEASE     • OTHER SURGICAL HISTORY      Treatment of carpometacarpal fracture dislocation, thumb.   • RECTAL SURGERY     • TONSILLECTOMY         Family History:   Family History   Problem Relation Age of Onset   • Cancer Other    • Heart disease Other    • Hyperlipidemia Other    • Hypertension Other    • Hyperlipidemia Mother    • Hypertension Father    • Heart attack Father    • Sleep apnea Father    • Breast cancer Maternal Grandmother 58   • Cancer Maternal Grandmother    • Ovarian cancer Maternal Aunt 55       Social History:   Social History     Socioeconomic History   • Marital status:      Spouse name: Not on file   • Number of children: Not on file   • Years of education: Not on file   • Highest education level: Not on file   Tobacco Use   • Smoking status: Never Smoker   • Smokeless tobacco: Never Used   Substance and Sexual Activity   • Alcohol use: Yes     Comment: occasional   • Drug use: No   • Sexual activity: Yes     Partners: Male     Birth control/protection: Condom       Medications:     Current Outpatient Medications:   •  albuterol sulfate  (90 Base) MCG/ACT inhaler, INHALE 2 PUFFS BY MOUTH EVERY 4 TO 6 HOURS AS NEEDED FOR COUGH OR SHORTNESS OF BREATH OR WHEEZING, Disp: 18 g, Rfl: 0  •  atomoxetine (Strattera) 25 MG capsule, Take 1 capsule by mouth Daily., Disp: 30 capsule, Rfl: 3  •  DULoxetine (CYMBALTA) 60 MG capsule, TAKE 1 CAPSULE BY MOUTH DAILY, Disp: 90 capsule, Rfl: 3  •  fluticasone (FLONASE) 50 MCG/ACT nasal spray, 1 spray  "into the nostril(s) as directed by provider Daily., Disp: 18.2 mL, Rfl: 11  •  fluticasone-salmeterol (ADVAIR DISKUS) 250-50 MCG/DOSE DISKUS, Inhale 1 puff 2 (Two) Times a Day., Disp: 60 each, Rfl: 5  •  montelukast (SINGULAIR) 10 MG tablet, TAKE 1 TABLET BY MOUTH EVERY EVENING, Disp: 90 tablet, Rfl: 3    Allergies:   Allergies   Allergen Reactions   • Grass Itching       Objective     Physical Exam:  Vital Signs:   Vitals:    06/22/21 1001   BP: 131/76   Pulse: 94   SpO2: 98%   Weight: 101 kg (223 lb)   Height: 162.6 cm (64\")   Body mass index is 38.28 kg/m².      Physical Exam  Vitals and nursing note reviewed.   Constitutional:       General: She is not in acute distress.     Appearance: She is well-developed. She is not diaphoretic.   HENT:      Head: Normocephalic and atraumatic.      Comments: Mallampati 4 airway, redundant soft palate, large tongue     Nose: Nose normal.      Mouth/Throat:      Pharynx: No oropharyngeal exudate.   Eyes:      General: No scleral icterus.        Right eye: No discharge.         Left eye: No discharge.      Pupils: Pupils are equal, round, and reactive to light.   Neck:      Thyroid: No thyromegaly.      Trachea: No tracheal deviation.   Cardiovascular:      Rate and Rhythm: Normal rate and regular rhythm.      Heart sounds: Normal heart sounds. No murmur heard.   No friction rub. No gallop.    Pulmonary:      Effort: Pulmonary effort is normal. No respiratory distress.      Breath sounds: Normal breath sounds. No stridor. No wheezing or rales.   Abdominal:      General: There is no distension.      Palpations: Abdomen is soft.   Musculoskeletal:         General: No tenderness.      Cervical back: Neck supple.      Right lower leg: No edema.      Left lower leg: No edema.      Comments: Clubbing: none   Neurological:      Mental Status: She is alert and oriented to person, place, and time.      Cranial Nerves: No cranial nerve deficit.      Coordination: Coordination normal. "   Psychiatric:         Behavior: Behavior normal.         Thought Content: Thought content normal.         Judgment: Judgment normal.         Results Review:   I reviewed the patient's new clinical results.  Lab Results   Component Value Date    TSH 1.120 06/14/2021       Assessment / Plan      Assessment:   Problem List Items Addressed This Visit     None      Visit Diagnoses     Hypersomnolence    -  Primary    Relevant Orders    Home Sleep Study    Snoring        Obesity (BMI 30-39.9)        Restless legs                Plan:   1.  Patient with history of obesity, Mallampati 4 airway, excessive daytime sleepiness, snoring and witnessed apneas.  Concern for sleep disordered breathing is very high.  Discussed pathophysiology of sleep apnea.  Discussed side effects of untreated sleep apnea including cardiovascular, neurologic, metabolic and excessive daytime sleepiness.  Further diagnostic testing including home study versus lab study reviewed.  Will proceed with home-based testing as pretest probability for sleep apnea is very high.  Discussed that if home study is somehow negative then will definitely recommend in lab testing.  She is comfortable with this plan of care.    2.  Available treatment options including CPAP, oral appliance, surgical therapy, inspire therapy reviewed.  Patient will be candidate for CPAP therapy found to have significant sleep apnea.    3.  Increasing activity and weight loss counseled and recommended.  Weight loss impact on sleep apnea reviewed.  Patient is starting to work out and she was advised to continue working towards the same goal.  Portion control advised.    4.  Restless leg symptoms are not too troublesome at this point.  Will keep close eye on those and if need be will consider further management options including checking iron studies and/or dopamine agonist therapy down the road.    5.  Thyroid studies are normal range and that is reassuring.    We will follow her after  sleep study to go over the results and discuss further management options.  Patient had no further questions or concerns at this point.  Thank you for allowing me to participate in the care of this patient.    Follow Up:   After HST    Discussed plan of care in detail with patient today. Patient verbally understands and agrees. I spent 45 minutes on this date of service. This time includes time spent by me in the following activities:preparing for the visit, reviewing tests, obtaining and/or reviewing a separately obtained history, performing a medically appropriate examination, counseling the patien, ordering tests, or procedures, and/or documenting information in the medical record.     Jacky Gray MD  Pulmonary Critical Care and Sleep Medicine      Please note that portions of this note may have been completed with a voice recognition program. Efforts were made to edit the dictations, but occasionally words are mistranscribed.

## 2021-07-02 ENCOUNTER — HOSPITAL ENCOUNTER (OUTPATIENT)
Dept: SLEEP MEDICINE | Facility: HOSPITAL | Age: 51
Discharge: HOME | End: 2021-07-02
Admitting: INTERNAL MEDICINE

## 2021-07-02 VITALS — HEIGHT: 64 IN | WEIGHT: 223 LBS | BODY MASS INDEX: 38.07 KG/M2

## 2021-07-02 DIAGNOSIS — G47.10 HYPERSOMNOLENCE: ICD-10-CM

## 2021-07-02 PROCEDURE — 95800 SLP STDY UNATTENDED: CPT

## 2021-07-02 PROCEDURE — 95800 SLP STDY UNATTENDED: CPT | Performed by: INTERNAL MEDICINE

## 2021-07-06 DIAGNOSIS — E66.9 OBESITY (BMI 30-39.9): ICD-10-CM

## 2021-07-06 DIAGNOSIS — G47.33 MILD OBSTRUCTIVE SLEEP APNEA: Primary | ICD-10-CM

## 2021-07-06 DIAGNOSIS — R06.83 SNORING: ICD-10-CM

## 2021-07-06 DIAGNOSIS — G47.10 HYPERSOMNOLENCE: ICD-10-CM

## 2021-07-07 NOTE — PROGRESS NOTES
CALLED PATIENT TO ADVISE OF STUDY. REACHED VM AND LEFT MESSAGE REQUESTING RETURN CALL 07/07/21 TRC

## 2021-07-14 ENCOUNTER — HOSPITAL ENCOUNTER (OUTPATIENT)
Dept: MAMMOGRAPHY | Facility: HOSPITAL | Age: 51
Discharge: HOME | End: 2021-07-14
Admitting: FAMILY MEDICINE

## 2021-07-14 DIAGNOSIS — Z12.31 ENCOUNTER FOR SCREENING MAMMOGRAM FOR MALIGNANT NEOPLASM OF BREAST: ICD-10-CM

## 2021-07-14 PROCEDURE — 77063 BREAST TOMOSYNTHESIS BI: CPT

## 2021-07-14 PROCEDURE — 77067 SCR MAMMO BI INCL CAD: CPT | Performed by: RADIOLOGY

## 2021-07-14 PROCEDURE — 77067 SCR MAMMO BI INCL CAD: CPT

## 2021-07-14 PROCEDURE — 77063 BREAST TOMOSYNTHESIS BI: CPT | Performed by: RADIOLOGY

## 2021-08-02 DIAGNOSIS — J45.20 MILD INTERMITTENT ASTHMA WITHOUT COMPLICATION: ICD-10-CM

## 2021-08-09 RX ORDER — ALBUTEROL SULFATE 90 UG/1
AEROSOL, METERED RESPIRATORY (INHALATION)
Qty: 18 G | Refills: 0 | Status: SHIPPED | OUTPATIENT
Start: 2021-08-09 | End: 2021-10-14

## 2021-08-16 PROBLEM — Z01.419 WELL WOMAN EXAM: Status: ACTIVE | Noted: 2021-08-16

## 2021-08-17 ENCOUNTER — LAB (OUTPATIENT)
Dept: LAB | Facility: HOSPITAL | Age: 51
End: 2021-08-17

## 2021-08-17 ENCOUNTER — OFFICE VISIT (OUTPATIENT)
Dept: OBSTETRICS AND GYNECOLOGY | Facility: CLINIC | Age: 51
End: 2021-08-17

## 2021-08-17 VITALS
SYSTOLIC BLOOD PRESSURE: 142 MMHG | WEIGHT: 220.4 LBS | BODY MASS INDEX: 37.63 KG/M2 | DIASTOLIC BLOOD PRESSURE: 98 MMHG | HEIGHT: 64 IN

## 2021-08-17 DIAGNOSIS — N93.9 ABNORMAL UTERINE BLEEDING (AUB): ICD-10-CM

## 2021-08-17 DIAGNOSIS — Z01.419 WELL WOMAN EXAM WITH ROUTINE GYNECOLOGICAL EXAM: Primary | ICD-10-CM

## 2021-08-17 LAB
DEPRECATED RDW RBC AUTO: 39.2 FL (ref 37–54)
ERYTHROCYTE [DISTWIDTH] IN BLOOD BY AUTOMATED COUNT: 12.3 % (ref 12.3–15.4)
HCT VFR BLD AUTO: 39.5 % (ref 34–46.6)
HGB BLD-MCNC: 13.4 G/DL (ref 12–15.9)
MCH RBC QN AUTO: 29.6 PG (ref 26.6–33)
MCHC RBC AUTO-ENTMCNC: 33.9 G/DL (ref 31.5–35.7)
MCV RBC AUTO: 87.2 FL (ref 79–97)
PLATELET # BLD AUTO: 325 10*3/MM3 (ref 140–450)
PMV BLD AUTO: 9.9 FL (ref 6–12)
RBC # BLD AUTO: 4.53 10*6/MM3 (ref 3.77–5.28)
TSH SERPL DL<=0.05 MIU/L-ACNC: 1.4 UIU/ML (ref 0.27–4.2)
WBC # BLD AUTO: 6.08 10*3/MM3 (ref 3.4–10.8)

## 2021-08-17 PROCEDURE — 99386 PREV VISIT NEW AGE 40-64: CPT | Performed by: OBSTETRICS & GYNECOLOGY

## 2021-08-17 PROCEDURE — 84443 ASSAY THYROID STIM HORMONE: CPT

## 2021-08-17 PROCEDURE — 85027 COMPLETE CBC AUTOMATED: CPT

## 2021-08-17 RX ORDER — TRIAMCINOLONE ACETONIDE 5 MG/G
OINTMENT TOPICAL 2 TIMES DAILY
COMMUNITY
Start: 2021-08-03 | End: 2022-02-18

## 2021-08-17 NOTE — PROGRESS NOTES
Subjective   Chief Complaint   Patient presents with   • Establish Care     New Pt   • Gynecologic Exam     annual.     Isabel Landrum is a 50 y.o. year old  presenting to be seen for her annual exam.     SEXUAL Hx:  She is currently sexually active.  In the past year there there has been NO new sexual partners.   Condoms are never used.  She would not like to be screened for STD's at today's exam.  Current birth control method: vasectomy.  She is happy with her current method of contraception and does not want to discuss alternative methods of contraception.  MENSTRUAL Hx:  Patient's last menstrual period was 2021.   In the past 2 years her cycles have been irregular.  However, she has not been keep tracking of this. Reports intermittently bleeding and spotting. Reports they do seem heavier   Intermenstrual bleeding is absent.    Post-coital bleeding is absent.  Dysmenorrhea: none  PMS: none  Her cycles are a source of concern for her that she wishes to discuss today.  HEALTH Hx:  She exercises regularly: walking some.  She wears her seat belt: yes.  She has concerns about domestic violence: no.  OTHER THINGS SHE WANTS TO DISCUSS TODAY:  Nothing else    The following portions of the patient's history were reviewed and updated as appropriate:problem list, current medications, allergies, past family history, past medical history, past social history and past surgical history.    Social History    Tobacco Use      Smoking status: Never Smoker      Smokeless tobacco: Never Used      Tobacco comment: smoked infrequently in mid-90s    Review of Systems  Constitutional POS: nothing reported    NEG: anorexia or night sweats   Genitourinary POS: nothing reported    NEG: dysuria or hematuria      Gastointestinal POS: nothing reported    NEG: bloating, change in bowel habits, melena or reflux symptoms   Integument POS: nothing reported    NEG: moles that are changing in size, shape, color or rashes   Breast  "POS: nothing reported    NEG: persistent breast lump, skin dimpling or nipple discharge        Objective   /98   Ht 162.6 cm (64\")   Wt 100 kg (220 lb 6.4 oz)   LMP 08/02/2021   Breastfeeding No   BMI 37.83 kg/m²     General:  well developed; well nourished  no acute distress   Skin:  No suspicious lesions seen   Thyroid: normal to inspection and palpation   Breasts:  Examined in supine position  Symmetric without masses or skin dimpling  Nipples normal without inversion, lesions or discharge  There are no palpable axillary nodes   Abdomen: soft, non-tender; no masses  no umbilical or inguinal hernias are present  no hepato-splenomegaly   Pelvis: Clinical staff was present for exam  External genitalia:  normal appearance of the external genitalia including Bartholin's and Iantha's glands.  :  urethral meatus normal;  Vaginal:  normal pink mucosa without prolapse or lesions.  Cervix:  normal appearance. ?small cervical polyp   Uterus:  normal size, shape and consistency.  Adnexa:  normal bimanual exam of the adnexa.  Rectal:  digital rectal exam not performed; anus visually normal appearing.        Assessment   1. Normal GYN exam  2. AUB - this is a new problem that needs further work up. Suspect anovulatory bleeding versus also ?small cervical polyp      Plan   Pap and HPV were done today.  If she does not receive the results of the Pap within 2 weeks  time, she was instructed to call to find out the results.  I explained to Isabel that the recommendations for Pap smear interval in a low risk patient's has lengthened to 5 years time if both cytology and HPV testing were normal.  I encouraged her to be seen yearly for a full physical exam including breast and pelvic exam even during the off years when PAP's will not be performed.  She was encouraged to get yearly mammograms.  She should report any palpable breast lump(s) or skin changes regardless of mammographic findings.  I explained to Isabel that " notification regarding her mammogram results will come from the center performing the study.  Our office will not be routinely calling with mammogram results.  It is her responsibility to make sure that the results from the mammogram are communicated to her by the breast center.  If she has any questions about the results, she is welcome to call our office anytime.  The following tests were ordered today: CBC w/o differential and TSH.  It was explained to Isabel that all lab test should be back within the one week after they are performed. She will be notified about the results, regardless of the findings. If she has not been contacted by the office within 2 weeks after the test has been performed, it is her responsibility to contact us to learn about her results.  Colonoscopy UTD  No prescription was given or electronically sent at today's visit  The importance of keeping all planned follow-up and taking all medications as prescribed was emphasized.  Follow up for annual exam in one year   Ultrasound with visit with me - Reviewed embx and possible cervical polyp removal if ultrasound is overall normal.     No orders of the defined types were placed in this encounter.         This note was electronically signed.    Shaye Sanchez MD  August 17, 2021    Note: Speech recognition transcription software may have been used to create portions of this document.  An attempt at proofreading has been made but errors in transcription could still be present.

## 2021-08-18 ENCOUNTER — TELEPHONE (OUTPATIENT)
Dept: OBSTETRICS AND GYNECOLOGY | Facility: CLINIC | Age: 51
End: 2021-08-18

## 2021-08-19 ENCOUNTER — TELEPHONE (OUTPATIENT)
Dept: OBSTETRICS AND GYNECOLOGY | Facility: CLINIC | Age: 51
End: 2021-08-19

## 2021-08-20 NOTE — TELEPHONE ENCOUNTER
Left message for PT advising we would just need a Release of Information from her Dr, or she can sign one on her next visit.

## 2021-08-27 ENCOUNTER — OFFICE VISIT (OUTPATIENT)
Dept: OBSTETRICS AND GYNECOLOGY | Facility: CLINIC | Age: 51
End: 2021-08-27

## 2021-08-27 VITALS
DIASTOLIC BLOOD PRESSURE: 82 MMHG | SYSTOLIC BLOOD PRESSURE: 138 MMHG | BODY MASS INDEX: 37.56 KG/M2 | WEIGHT: 220 LBS | HEIGHT: 64 IN

## 2021-08-27 DIAGNOSIS — N84.1 CERVICAL POLYP: ICD-10-CM

## 2021-08-27 DIAGNOSIS — N93.9 ABNORMAL UTERINE BLEEDING (AUB): Primary | ICD-10-CM

## 2021-08-27 DIAGNOSIS — N84.0 ENDOMETRIAL POLYP: ICD-10-CM

## 2021-08-27 PROCEDURE — 99213 OFFICE O/P EST LOW 20 MIN: CPT | Performed by: OBSTETRICS & GYNECOLOGY

## 2021-08-27 NOTE — PROGRESS NOTES
Subjective   Chief Complaint   Patient presents with   • Follow-up     US today       Isabel Landrum is a 50 y.o. year old  who is scheduled  for surgery due to AUB, endometrial polyp, cervical polyp. She had an ultrasound today. Reports intermittent bleeding and heavier bleeding.     Past Medical History:   Diagnosis Date   • Acute sinusitis    • Allergic rhinitis    • Asthma    • Cancer (CMS/HCC)     not yet cancer, but anal adenoma w/ high dysplasia   • Colorectal cancer (CMS/HCC)     not yet cancer, but anal adenoma w/ high dysplasia   • Depression with anxiety    • GERD (gastroesophageal reflux disease)    • Headache    • Herpes    • HPV (human papilloma virus) infection ?    Perhaps? Recent anal lesions suggest yes.   • Migraine occasional since mid-   • PMS (premenstrual syndrome)     occasional   • Urogenital trichomoniasis ~?   • Varicella      Past Surgical History:   Procedure Laterality Date   • CARPAL TUNNEL RELEASE  2010   • OTHER SURGICAL HISTORY      Treatment of carpometacarpal fracture dislocation, thumb.   • RECTAL SURGERY  2021   • TONSILLECTOMY     • UMBILICAL HERNIA REPAIR     • WISDOM TOOTH EXTRACTION       OB History    Para Term  AB Living   3 2 2 0 1 2   SAB TAB Ectopic Molar Multiple Live Births   0 0 0 0 0 2      # Outcome Date GA Lbr Thomas/2nd Weight Sex Delivery Anes PTL Lv   3 Term 09   3912 g (8 lb 10 oz) M Vag-Spont   EZIO   2 Term 05   3260 g (7 lb 3 oz) F Vag-Spont   EZIO   1 AB               Obstetric Comments    - Annmarie    2009 - [place]       History of genital herpes     Social History     Socioeconomic History   • Marital status:      Spouse name: Not on file   • Number of children: Not on file   • Years of education: Not on file   • Highest education level: Not on file   Tobacco Use   • Smoking status: Never Smoker   • Smokeless tobacco: Never Used   • Tobacco comment: smoked  infrequently in mid-90s   Substance and Sexual Activity   • Alcohol use: Yes     Alcohol/week: 4.0 - 5.0 standard drinks     Types: 2 Glasses of wine, 2 - 3 Standard drinks or equivalent per week     Comment: occasional   • Drug use: No   • Sexual activity: Yes     Partners: Male     Birth control/protection: Condom, Surgical, Partner's vasectomy     Comment:  just had vasectomy, I'm probably perimenopausal.     Prior to Admission medications    Medication Sig Start Date End Date Taking? Authorizing Provider   albuterol sulfate  (90 Base) MCG/ACT inhaler INHALE 2 PUFFS BY MOUTH EVERY 4 TO 6 HOURS AS NEEDED FOR COUGH OR SHORTNESS OF BREATH OR WHEEZING 8/9/21   Rosalie Anton MD   atomoxetine (Strattera) 25 MG capsule Take 1 capsule by mouth Daily. 6/14/21   Rosalei Anton MD   DULoxetine (CYMBALTA) 60 MG capsule TAKE 1 CAPSULE BY MOUTH DAILY 8/7/20   Rosalie Anton MD   fluticasone (FLONASE) 50 MCG/ACT nasal spray 1 spray into the nostril(s) as directed by provider Daily. 3/6/19   Rosalie Anton MD   fluticasone-salmeterol (ADVAIR DISKUS) 250-50 MCG/DOSE DISKUS Inhale 1 puff 2 (Two) Times a Day. 3/6/19   Rosalie Anton MD   montelukast (SINGULAIR) 10 MG tablet TAKE 1 TABLET BY MOUTH EVERY EVENING 10/19/20   Rosalie Anton MD   triamcinolone (KENALOG) 0.5 % ointment Apply  topically to the appropriate area as directed 2 (Two) Times a Day. 8/3/21   ProviderGemma MD       Allergies   Allergen Reactions   • Grass Itching     Social History    Tobacco Use      Smoking status: Never Smoker      Smokeless tobacco: Never Used      Tobacco comment: smoked infrequently in mid-90s    Review of Systems   Constitutional: Negative for chills and fever.   HENT: Negative for congestion and sore throat.    Respiratory: Negative for shortness of breath and wheezing.    Cardiovascular: Negative for chest pain and palpitations.   Gastrointestinal: Negative for abdominal pain, nausea and vomiting.  "  Genitourinary: Negative for frequency, vaginal bleeding and vaginal pain.   Musculoskeletal: Negative for back pain and myalgias.   Neurological: Negative for dizziness, light-headedness and headaches.   Psychiatric/Behavioral: Negative for confusion. The patient is not nervous/anxious.          Objective   /82   Ht 162.6 cm (64\")   Wt 99.8 kg (220 lb)   LMP 08/02/2021   Breastfeeding No   BMI 37.76 kg/m²   General: well developed; well nourished  no acute distress  alert and oriented x3   Heart: Not performed.   Lungs: breathing is unlabored   Abdomen: soft, non-tender; no masses  no umbilical or inguinal hernias are present  no hepato-splenomegaly   Pelvis:: Not performed.        Assessment   1. Abnormal uterine bleeding  2. Cervical polyp  3. Endometrial polyp     Plan   1. Plan to proceed with hysteroscopy, dilation and curettage, polypectomy with myosure.   Today I discussed with Isabel the risks of her upcoming hysteroscopy with possible Myosure resection of intracavitary pathology. Risks reviewed included intraoperative bleeding, infection at the site of surgery and damage to the adjacent surrounding organs. Additionally, the small risk for reoperation in the event of unanticipated bleeding or surgical injury was discussed.  Finally we discussed the risks of cerebral and/or pulmonary edema related to excess absorption of the distending fluid and the small chance the procedure could not be completed if there was an excessive mismatch of fluid infused & fluid recovered.  All of her questions were answered fully.  She left with a very clear understanding of the preoperative surgical indications and the nature of the surgery for which she is scheduled.  She understands to be NPO after midnight and to be at the preoperative area ~ 1 hour prior to the scheduled surgical start time.  Schedule surgery           Shaye Sanchez MD  8/27/2021       (Pt's PCP is Rosalie Anton MD)        "

## 2021-08-28 DIAGNOSIS — F41.8 DEPRESSION WITH ANXIETY: ICD-10-CM

## 2021-08-28 RX ORDER — DULOXETIN HYDROCHLORIDE 60 MG/1
60 CAPSULE, DELAYED RELEASE ORAL DAILY
Qty: 90 CAPSULE | Refills: 0 | Status: SHIPPED | OUTPATIENT
Start: 2021-08-28 | End: 2021-12-03

## 2021-09-01 ENCOUNTER — APPOINTMENT (OUTPATIENT)
Dept: PREADMISSION TESTING | Facility: HOSPITAL | Age: 51
End: 2021-09-01

## 2021-09-01 LAB — SARS-COV-2 RNA PNL SPEC NAA+PROBE: NOT DETECTED

## 2021-09-01 PROCEDURE — C9803 HOPD COVID-19 SPEC COLLECT: HCPCS

## 2021-09-01 PROCEDURE — U0004 COV-19 TEST NON-CDC HGH THRU: HCPCS

## 2021-09-02 ENCOUNTER — PREP FOR SURGERY (OUTPATIENT)
Dept: OTHER | Facility: HOSPITAL | Age: 51
End: 2021-09-02

## 2021-09-02 NOTE — H&P
Subjective   CC: AUB  Isabel Landrum is a 50 y.o. year old  who is scheduled  for surgery due to AUB, concern for possible endometrial polyp and cervical polyp.    Past Medical History:   Diagnosis Date   • Acute sinusitis    • Allergic rhinitis    • Asthma    • Cancer (CMS/HCC)     not yet cancer, but anal adenoma w/ high dysplasia   • Colorectal cancer (CMS/HCC)     not yet cancer, but anal adenoma w/ high dysplasia   • Depression with anxiety    • GERD (gastroesophageal reflux disease)    • Headache    • Herpes    • HPV (human papilloma virus) infection ?    Perhaps? Recent anal lesions suggest yes.   • Migraine occasional since    • PMS (premenstrual syndrome)     occasional   • Urogenital trichomoniasis ~?   • Varicella      Past Surgical History:   Procedure Laterality Date   • CARPAL TUNNEL RELEASE  2010   • OTHER SURGICAL HISTORY      Treatment of carpometacarpal fracture dislocation, thumb.   • RECTAL SURGERY  2021   • TONSILLECTOMY     • UMBILICAL HERNIA REPAIR     • WISDOM TOOTH EXTRACTION       OB History    Para Term  AB Living   3 2 2 0 1 2   SAB TAB Ectopic Molar Multiple Live Births   0 0 0 0 0 2      # Outcome Date GA Lbr Thomas/2nd Weight Sex Delivery Anes PTL Lv   3 Term 09   3912 g (8 lb 10 oz) M Vag-Spont   EZIO   2 Term 05   3260 g (7 lb 3 oz) F Vag-Spont   EZIO   1 AB               Obstetric Comments    - Annmarie    2009 - [place]       History of genital herpes     Social History     Socioeconomic History   • Marital status:      Spouse name: Not on file   • Number of children: Not on file   • Years of education: Not on file   • Highest education level: Not on file   Tobacco Use   • Smoking status: Never Smoker   • Smokeless tobacco: Never Used   • Tobacco comment: smoked infrequently in mid   Substance and Sexual Activity   • Alcohol use: Yes     Alcohol/week: 4.0 - 5.0 standard drinks     Types:  2 Glasses of wine, 2 - 3 Standard drinks or equivalent per week     Comment: occasional   • Drug use: No   • Sexual activity: Yes     Partners: Male     Birth control/protection: Condom, Surgical, Partner's vasectomy     Comment:  just had vasectomy, I'm probably perimenopausal.     Prior to Admission medications    Medication Sig Start Date End Date Taking? Authorizing Provider   albuterol sulfate  (90 Base) MCG/ACT inhaler INHALE 2 PUFFS BY MOUTH EVERY 4 TO 6 HOURS AS NEEDED FOR COUGH OR SHORTNESS OF BREATH OR WHEEZING 8/9/21   Rosalie Anton MD   atomoxetine (Strattera) 25 MG capsule Take 1 capsule by mouth Daily. 6/14/21   Rosalie Anton MD   DULoxetine (CYMBALTA) 60 MG capsule TAKE 1 CAPSULE BY MOUTH DAILY 8/28/21   Rosalie Anton MD   fluticasone (FLONASE) 50 MCG/ACT nasal spray 1 spray into the nostril(s) as directed by provider Daily. 3/6/19   Rosalie Anton MD   fluticasone-salmeterol (ADVAIR DISKUS) 250-50 MCG/DOSE DISKUS Inhale 1 puff 2 (Two) Times a Day. 3/6/19   Rosalie Anton MD   montelukast (SINGULAIR) 10 MG tablet TAKE 1 TABLET BY MOUTH EVERY EVENING 10/19/20   Rosalie Anton MD   triamcinolone (KENALOG) 0.5 % ointment Apply  topically to the appropriate area as directed 2 (Two) Times a Day. 8/3/21   Provider, MD Gemma       Allergies   Allergen Reactions   • Grass Itching     Social History    Tobacco Use      Smoking status: Never Smoker      Smokeless tobacco: Never Used      Tobacco comment: smoked infrequently in mid-90s    Review of Systems   Constitutional: Negative for chills and fever.   HENT: Negative for congestion and sore throat.    Respiratory: Negative for shortness of breath and wheezing.    Cardiovascular: Negative for chest pain and palpitations.   Gastrointestinal: Negative for abdominal pain, nausea and vomiting.   Genitourinary: Negative for frequency, vaginal bleeding and vaginal pain.   Musculoskeletal: Negative for back pain and myalgias.    Neurological: Negative for dizziness, light-headedness and headaches.   Psychiatric/Behavioral: Negative for confusion. The patient is not nervous/anxious.          Objective   There were no vitals taken for this visit.  General: well developed; well nourished  no acute distress   Alert and oriented x3   Heart: Not performed.   Lungs: breathing is unlabored   Abdomen: soft, non-tender; no masses  no umbilical or inguinal hernias are present  no hepato-splenomegaly   Pelvis:: Clinical staff was present for exam  External genitalia:  normal appearance of the external genitalia including Bartholin's and [place]'s glands.  :  urethral meatus normal;  Vaginal:  normal pink mucosa without prolapse or lesions.  Cervix:  normal appearance. ?small cervical polyp   Uterus:  normal size, shape and consistency.  Adnexa:  normal bimanual exam of the adnexa.  Rectal:  digital rectal exam not performed; anus visually normal appearing.   Indication  ========     Abnormal uterine bleeding (AUB) [N93.9 (ICD-10-CM)]        Comparison Studies  =================     There are no relevant prior studies to which this study is being compared        Method  =======     [place] E6, Transvaginal ultrasound examination, Color Doppler flow performed, 3D ultrasound examination. View: Adequate view        Uterus  ======     Uterus:  Normal  Uterus position: Anteverted  Description of uterine malformations:       none  Myometrium:      Homogeneous  Endometrium:    Uniform  Cervix details:    Normal  Uterus long        59 mm  Uterus ap           49 mm  Uterus tr             58 mm  Uterus Vol          88.8 cm³  Endometrial thickness, total        12.4 mm  Cervical length   31.2 mm        Right Ovary  ==========     Rt ovary:            Suboptimal        Left Ovary  =========     Lt ovary:             Suboptimal        Cul de Sac  =========     Normal. No free fluid visualized        Impression  =========     Normal pelvic ultrasound.  Endometrium  may be thickened for age. In the presence of bleeding, endometrial pathology cannot be excluded        Recommendation  ===============     Additional study with saline infusion sonography or endometrial biopsy may be indicated.        Assessment   1. AUB  2. Cervical polyp  3. Endometrial polyp     Plan   1. Plan to proceed with hysteroscopy, dilation and curettage, polypectomy with myosure.   2. Today I discussed with Isabel the risks of her upcoming hysteroscopy with possible Myosure resection of intracavitary pathology. Risks reviewed included intraoperative bleeding, infection at the site of surgery and damage to the adjacent surrounding organs. Additionally, the small risk for reoperation in the event of unanticipated bleeding or surgical injury was discussed.  Finally we discussed the risks of cerebral and/or pulmonary edema related to excess absorption of the distending fluid and the small chance the procedure could not be completed if there was an excessive mismatch of fluid infused & fluid recovered.  All of her questions were answered fully.  She left with a very clear understanding of the preoperative surgical indications and the nature of the surgery for which she is scheduled.  She understands to be NPO after midnight and to be at the preoperative area ~ 1 hour prior to the scheduled surgical start time.  3. Schedule surgery              Shaye Sanchez MD  9/2/2021       (Pt's PCP is Rosalie Anton MD)

## 2021-09-03 ENCOUNTER — LAB REQUISITION (OUTPATIENT)
Dept: LAB | Facility: HOSPITAL | Age: 51
End: 2021-09-03

## 2021-09-03 ENCOUNTER — OUTSIDE FACILITY SERVICE (OUTPATIENT)
Dept: OBSTETRICS AND GYNECOLOGY | Facility: CLINIC | Age: 51
End: 2021-09-03

## 2021-09-03 DIAGNOSIS — N93.9 ABNORMAL UTERINE AND VAGINAL BLEEDING, UNSPECIFIED: ICD-10-CM

## 2021-09-03 PROCEDURE — 58558 HYSTEROSCOPY BIOPSY: CPT | Performed by: OBSTETRICS & GYNECOLOGY

## 2021-09-03 PROCEDURE — 88305 TISSUE EXAM BY PATHOLOGIST: CPT | Performed by: OBSTETRICS & GYNECOLOGY

## 2021-09-07 ENCOUNTER — TELEPHONE (OUTPATIENT)
Dept: OBSTETRICS AND GYNECOLOGY | Facility: CLINIC | Age: 51
End: 2021-09-07

## 2021-09-07 LAB
CYTO UR: NORMAL
LAB AP CASE REPORT: NORMAL
LAB AP CLINICAL INFORMATION: NORMAL
LAB AP DIAGNOSIS COMMENT: NORMAL
PATH REPORT.FINAL DX SPEC: NORMAL
PATH REPORT.GROSS SPEC: NORMAL

## 2021-09-07 NOTE — TELEPHONE ENCOUNTER
"Spoke with Isabel, reports having \"normal period\" bleeding starting yesterday morning. Changing pad q 4-6 hours. Message sent to Dr Sanchez for review.   "

## 2021-09-07 NOTE — TELEPHONE ENCOUNTER
[place]      POST-OP PT HAD HYSTEROSCOPY RECENTLY. SHE IS HAVING A BIT MORE THAN SPOTTING. WAS TOLD TO CALL IF THIS IS THE CASE.

## 2021-09-08 NOTE — TELEPHONE ENCOUNTER
Called patient and she reports bleeding has decreased and is not heavier than a period. Provided reassurance and future precautions. I also reviewed benign pathology from hysteroscopy D&C.     Shaye Sanchez MD

## 2021-09-20 ENCOUNTER — OFFICE VISIT (OUTPATIENT)
Dept: OBSTETRICS AND GYNECOLOGY | Facility: CLINIC | Age: 51
End: 2021-09-20

## 2021-09-20 VITALS
HEIGHT: 64 IN | BODY MASS INDEX: 37.56 KG/M2 | WEIGHT: 220 LBS | DIASTOLIC BLOOD PRESSURE: 72 MMHG | SYSTOLIC BLOOD PRESSURE: 136 MMHG

## 2021-09-20 DIAGNOSIS — Z98.890 POST-OPERATIVE STATE: Primary | ICD-10-CM

## 2021-09-20 PROCEDURE — 99024 POSTOP FOLLOW-UP VISIT: CPT | Performed by: OBSTETRICS & GYNECOLOGY

## 2021-09-20 NOTE — PROGRESS NOTES
"Subjective   Chief Complaint   Patient presents with   • Post-op     2w3d post op     Isabel Landrum is a 50 y.o. year old  presenting to be seen for her post-operative visit.  Currently she reports no problems with eating, bowel movements, voiding, or wound drainage and pain is well controlled.    The pathology results from her procedure are in Isabel's record and are benign.      OTHER THINGS SHE WANTS TO DISCUSS TODAY:  Nothing else    The following portions of the patient's history were reviewed and updated as appropriate:current medications and allergies       Objective   /72   Ht 162.6 cm (64\")   Wt 99.8 kg (220 lb)   LMP 2021   Breastfeeding No   BMI 37.76 kg/m²     General:  well developed; well nourished  no acute distress   Abdomen: soft, non-tender; no masses  no umbilical or inguinal hernias are present  no hepato-splenomegaly   Pelvis: Clinical staff was present for exam  External genitalia:  normal appearance of the external genitalia including Bartholin's and [place]'s glands.  :  urethral meatus normal;  Vaginal:  normal pink mucosa without prolapse or lesions.  Cervix:  normal appearance.  Uterus:  normal size, shape and consistency.  Adnexa:  normal bimanual exam of the adnexa.  Rectal:  digital rectal exam not performed; anus visually normal appearing.          Assessment   1. S/P hysteroscopy, D&C     Plan   1. May return to full activity with no restrictions  2. The importance of keeping all planned follow-up and taking all medications as prescribed was emphasized.  3. Follow up for annual exam in one year.    No orders of the defined types were placed in this encounter.         This note was electronically signed.    Shaye Sanchez MD  2021    Note: Speech recognition transcription software may have been used to create portions of this document.  An attempt at proofreading has been made but errors in transcription could still be present.  "

## 2021-09-22 ENCOUNTER — TELEPHONE (OUTPATIENT)
Dept: OBSTETRICS AND GYNECOLOGY | Facility: CLINIC | Age: 51
End: 2021-09-22

## 2021-09-22 NOTE — TELEPHONE ENCOUNTER
DR MOHAMUD PT    ETHAN WAS HERE Monday FOR A POSTOP APPOINTMENT, SHE WAS NOT FEELING WELL THOUGHT SHE HAD A COLD SO DOUBLE MASKED. AFTER HER APPT SHE WENT TO BE COVID TESTED AND RECEIVED RESULTS LAST NIGHT THAT SHE IS POSITIVE.

## 2021-09-27 DIAGNOSIS — F90.9 ADULT ADHD (ATTENTION DEFICIT HYPERACTIVITY DISORDER): ICD-10-CM

## 2021-09-28 RX ORDER — ATOMOXETINE 25 MG/1
25 CAPSULE ORAL DAILY
Qty: 30 CAPSULE | Refills: 3 | Status: SHIPPED | OUTPATIENT
Start: 2021-09-28 | End: 2022-02-08

## 2021-10-13 DIAGNOSIS — J45.20 MILD INTERMITTENT ASTHMA WITHOUT COMPLICATION: ICD-10-CM

## 2021-10-14 RX ORDER — ALBUTEROL SULFATE 90 UG/1
AEROSOL, METERED RESPIRATORY (INHALATION)
Qty: 18 G | Refills: 0 | Status: SHIPPED | OUTPATIENT
Start: 2021-10-14 | End: 2022-01-14 | Stop reason: SDUPTHER

## 2021-11-05 DIAGNOSIS — J45.20 MILD INTERMITTENT ASTHMA WITHOUT COMPLICATION: ICD-10-CM

## 2021-11-05 RX ORDER — MONTELUKAST SODIUM 10 MG/1
TABLET ORAL
Qty: 90 TABLET | Refills: 3 | Status: SHIPPED | OUTPATIENT
Start: 2021-11-05 | End: 2022-11-08

## 2021-12-03 DIAGNOSIS — F41.8 DEPRESSION WITH ANXIETY: ICD-10-CM

## 2021-12-03 RX ORDER — DULOXETIN HYDROCHLORIDE 60 MG/1
60 CAPSULE, DELAYED RELEASE ORAL DAILY
Qty: 90 CAPSULE | Refills: 0 | Status: SHIPPED | OUTPATIENT
Start: 2021-12-03 | End: 2022-02-09 | Stop reason: SDUPTHER

## 2021-12-17 ENCOUNTER — TELEPHONE (OUTPATIENT)
Dept: FAMILY MEDICINE CLINIC | Facility: CLINIC | Age: 51
End: 2021-12-17

## 2022-01-06 DIAGNOSIS — J45.20 MILD INTERMITTENT ASTHMA WITHOUT COMPLICATION: ICD-10-CM

## 2022-01-06 RX ORDER — ALBUTEROL SULFATE 90 UG/1
AEROSOL, METERED RESPIRATORY (INHALATION)
Qty: 18 G | Refills: 0 | Status: CANCELLED | OUTPATIENT
Start: 2022-01-06

## 2022-01-14 DIAGNOSIS — J45.20 MILD INTERMITTENT ASTHMA WITHOUT COMPLICATION: ICD-10-CM

## 2022-01-14 RX ORDER — ALBUTEROL SULFATE 90 UG/1
2 AEROSOL, METERED RESPIRATORY (INHALATION) EVERY 4 HOURS PRN
Qty: 18 G | Refills: 0 | Status: SHIPPED | OUTPATIENT
Start: 2022-01-14 | End: 2022-03-09

## 2022-01-26 ENCOUNTER — OFFICE VISIT (OUTPATIENT)
Dept: FAMILY MEDICINE CLINIC | Facility: CLINIC | Age: 52
End: 2022-01-26

## 2022-01-26 VITALS
WEIGHT: 227 LBS | HEIGHT: 64 IN | BODY MASS INDEX: 38.76 KG/M2 | TEMPERATURE: 98.6 F | RESPIRATION RATE: 18 BRPM | DIASTOLIC BLOOD PRESSURE: 88 MMHG | HEART RATE: 103 BPM | OXYGEN SATURATION: 99 % | SYSTOLIC BLOOD PRESSURE: 124 MMHG

## 2022-01-26 DIAGNOSIS — F90.9 ADULT ADHD (ATTENTION DEFICIT HYPERACTIVITY DISORDER): Primary | ICD-10-CM

## 2022-01-26 PROCEDURE — 99213 OFFICE O/P EST LOW 20 MIN: CPT | Performed by: NURSE PRACTITIONER

## 2022-02-04 NOTE — ASSESSMENT & PLAN NOTE
Psychological condition is unchanged.  Referral to psychological counseling.  Referral to psychiatry.  Psychological condition  will be reassessed at the next regular appointment.

## 2022-02-08 DIAGNOSIS — F90.9 ADULT ADHD (ATTENTION DEFICIT HYPERACTIVITY DISORDER): ICD-10-CM

## 2022-02-08 RX ORDER — ATOMOXETINE 25 MG/1
25 CAPSULE ORAL DAILY
Qty: 30 CAPSULE | Refills: 1 | Status: SHIPPED | OUTPATIENT
Start: 2022-02-08 | End: 2022-02-09 | Stop reason: ALTCHOICE

## 2022-02-09 ENCOUNTER — TELEMEDICINE (OUTPATIENT)
Dept: PSYCHIATRY | Facility: CLINIC | Age: 52
End: 2022-02-09

## 2022-02-09 DIAGNOSIS — F33.1 MODERATE EPISODE OF RECURRENT MAJOR DEPRESSIVE DISORDER: Primary | Chronic | ICD-10-CM

## 2022-02-09 DIAGNOSIS — F98.8 ADD (ATTENTION DEFICIT DISORDER) WITHOUT HYPERACTIVITY: ICD-10-CM

## 2022-02-09 DIAGNOSIS — F41.1 GENERALIZED ANXIETY DISORDER: Chronic | ICD-10-CM

## 2022-02-09 PROCEDURE — 90792 PSYCH DIAG EVAL W/MED SRVCS: CPT

## 2022-02-09 RX ORDER — DULOXETIN HYDROCHLORIDE 60 MG/1
120 CAPSULE, DELAYED RELEASE ORAL DAILY
Qty: 60 CAPSULE | Refills: 0 | Status: SHIPPED | OUTPATIENT
Start: 2022-02-09 | End: 2022-03-10

## 2022-02-09 RX ORDER — BUPROPION HYDROCHLORIDE 150 MG/1
150 TABLET ORAL EVERY MORNING
Qty: 30 TABLET | Refills: 0 | Status: SHIPPED | OUTPATIENT
Start: 2022-02-09 | End: 2022-03-09 | Stop reason: SDUPTHER
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
## 2022-02-18 RX ORDER — TRIAMCINOLONE ACETONIDE 5 MG/G
OINTMENT TOPICAL 2 TIMES DAILY
Qty: 15 G | Refills: 2 | Status: SHIPPED | OUTPATIENT
Start: 2022-02-18

## 2022-03-08 DIAGNOSIS — J45.20 MILD INTERMITTENT ASTHMA WITHOUT COMPLICATION: ICD-10-CM

## 2022-03-09 DIAGNOSIS — F98.8 ADD (ATTENTION DEFICIT DISORDER) WITHOUT HYPERACTIVITY: ICD-10-CM

## 2022-03-09 DIAGNOSIS — F33.1 MODERATE EPISODE OF RECURRENT MAJOR DEPRESSIVE DISORDER: Chronic | ICD-10-CM

## 2022-03-09 RX ORDER — ALBUTEROL SULFATE 90 UG/1
AEROSOL, METERED RESPIRATORY (INHALATION)
Qty: 18 G | Refills: 0 | Status: SHIPPED | OUTPATIENT
Start: 2022-03-09 | End: 2022-06-02 | Stop reason: SDUPTHER

## 2022-03-09 RX ORDER — BUPROPION HYDROCHLORIDE 150 MG/1
150 TABLET ORAL EVERY MORNING
Qty: 30 TABLET | Refills: 0 | Status: SHIPPED | OUTPATIENT
Start: 2022-03-09 | End: 2022-03-21 | Stop reason: SDUPTHER

## 2022-03-10 DIAGNOSIS — F33.1 MODERATE EPISODE OF RECURRENT MAJOR DEPRESSIVE DISORDER: Chronic | ICD-10-CM

## 2022-03-10 DIAGNOSIS — F41.1 GENERALIZED ANXIETY DISORDER: Chronic | ICD-10-CM

## 2022-03-10 RX ORDER — DULOXETIN HYDROCHLORIDE 60 MG/1
CAPSULE, DELAYED RELEASE ORAL
Qty: 90 CAPSULE | Refills: 1 | Status: SHIPPED | OUTPATIENT
Start: 2022-03-10 | End: 2022-03-21 | Stop reason: SDUPTHER

## 2022-03-21 ENCOUNTER — TELEMEDICINE (OUTPATIENT)
Dept: PSYCHIATRY | Facility: CLINIC | Age: 52
End: 2022-03-21

## 2022-03-21 DIAGNOSIS — F41.1 GENERALIZED ANXIETY DISORDER: Chronic | ICD-10-CM

## 2022-03-21 DIAGNOSIS — F33.1 MODERATE EPISODE OF RECURRENT MAJOR DEPRESSIVE DISORDER: Chronic | ICD-10-CM

## 2022-03-21 DIAGNOSIS — F98.8 ADD (ATTENTION DEFICIT DISORDER) WITHOUT HYPERACTIVITY: ICD-10-CM

## 2022-03-21 PROCEDURE — 99214 OFFICE O/P EST MOD 30 MIN: CPT

## 2022-03-21 RX ORDER — DULOXETIN HYDROCHLORIDE 60 MG/1
120 CAPSULE, DELAYED RELEASE ORAL DAILY
Qty: 180 CAPSULE | Refills: 0 | Status: SHIPPED | OUTPATIENT
Start: 2022-03-21 | End: 2022-05-02 | Stop reason: SDUPTHER

## 2022-03-21 RX ORDER — BUPROPION HYDROCHLORIDE 150 MG/1
150 TABLET ORAL EVERY MORNING
Qty: 90 TABLET | Refills: 0 | Status: SHIPPED | OUTPATIENT
Start: 2022-03-21 | End: 2022-05-02 | Stop reason: SDUPTHER

## 2022-03-21 NOTE — PROGRESS NOTES
This provider is located at [place], [place]. The Patient is seen remotely using Video. Patient is being seen via telehealth and confirm that they are in a secure environment for this session. Patient is located in [place], [place] at her home. The patient's condition being diagnosed/treated is appropriate for telemedicine. Provider identified as Ronny Christian as well as credentials APRN MSN PMHNP-BC.   The client/patient gave consent to be seen remotely, and when consent is given they understand that the consent allows for patient identifiable information to be sent to a third party as needed.  They may refuse to be seen remotely at any time. The electronic data is encrypted and password protected, and the patient has been advised of the potential risks to privacy not withstanding such measures.    Chief Complaint  Depression, Anxiety, and ADD    Subjective        Isabel Landrum presents to [facility] for   History of Present Illness   Patient seen today for a follow-up visit for depression, anxiety, and ADD.  Patient reports that she saw an improvement since starting the Wellbutrin and increasing her Cymbalta.  Currently rates her depression a 4 on a 1-10 scale with 10 being the worst.  Denies any suicidal or homicidal ideation.  Rates her anxiety at 3-4 on a 1-10 scale with 10 being the worst.  States she still has some situational anxiety, but feels her anxiety is under control at this time.  She thinks the Wellbutrin is also helped with her ADD type symptoms.  States she is had increase in focus and concentration.  States that she still has some days where she has a lack of motivation, but states her sleep has not been good recently.  She thinks this is due to not having her CPAP machine.  She is hopeful to get that in the coming weeks and see if that makes a difference in her sleep and energy level.  States she does not want to change her medication dosages at this  time until she gets her CPAP and see what happens.  She states her appetite is good.  States she has had some weight loss, which she is happy about.  She denies any erich.  Denies any paranoia.  Denies any auditory or visual hallucinations.  Denies any side effects to the medications.  Objective   Vital Signs:   There were no vitals taken for this visit.      PHQ-9 Score:   PHQ-9 Total Score: (P) 11     Mental Status Exam:   Hygiene:   good  Cooperation:  Cooperative  Eye Contact:  Good  Psychomotor Behavior:  Appropriate  Affect:  Full range  Mood: depressed and anxious  Speech:  Normal  Thought Process:  Goal directed  Thought Content:  Normal  Suicidal:  None  Homicidal:  None  Hallucinations:  None  Delusion:  None  Memory:  Intact  Orientation:  Person, Place, Time and Situation  Reliability:  good  Insight:  Good  Judgement:  Good  Impulse Control:  Good  Physical/Medical Issues:  No      PHQ-9 Depression Screening  Little interest or pleasure in doing things? (P) 1   Feeling down, depressed, or hopeless? (P) 1   Trouble falling or staying asleep, or sleeping too much? (P) 2   Feeling tired or having little energy? (P) 2   Poor appetite or overeating? (P) 2   Feeling bad about yourself - or that you are a failure or have let yourself or your family down? (P) 1   Trouble concentrating on things, such as reading the newspaper or watching television? (P) 1   Moving or speaking so slowly that other people could have noticed? Or the opposite - being so fidgety or restless that you have been moving around a lot more than usual? (P) 1   Thoughts that you would be better off dead, or of hurting yourself in some way? (P) 0   PHQ-9 Total Score (P) 11   If you checked off any problems, how difficult have these problems made it for you to do your work, take care of things at home, or get along with other people? (P) Very difficult     PHQ-9 Total Score: (P) 11    JOEL 7 anxiety screening tool that patient filled out  virtually reviewed by this APRN at today's encounter.    PROMIS scale screening tool that patient filled out virtually reviewed by this APRN at today's encounter.    Previous Provider notes and available records reviewed by this APRN today.   Current Medications:   Current Outpatient Medications   Medication Sig Dispense Refill   • albuterol sulfate  (90 Base) MCG/ACT inhaler INHALE 2 PUFFS BY MOUTH EVERY 4 HOURS AS NEEDED 18 g 0   • buPROPion XL (Wellbutrin XL) 150 MG 24 hr tablet Take 1 tablet by mouth Every Morning for 90 days. 90 tablet 0   • DULoxetine (CYMBALTA) 60 MG capsule Take 2 capsules by mouth Daily for 90 days. 180 capsule 0   • fluticasone (FLONASE) 50 MCG/ACT nasal spray 1 spray into the nostril(s) as directed by provider Daily. 18.2 mL 11   • fluticasone-salmeterol (ADVAIR DISKUS) 250-50 MCG/DOSE DISKUS Inhale 1 puff 2 (Two) Times a Day. 60 each 5   • montelukast (SINGULAIR) 10 MG tablet TAKE 1 TABLET BY MOUTH EVERY EVENING 90 tablet 3   • triamcinolone (KENALOG) 0.5 % ointment Apply  topically to the appropriate area as directed 2 (Two) Times a Day. Do not apply for greater than 2 weeks. 15 g 2     No current facility-administered medications for this visit.       Physical Exam   Result Review :    The following data was reviewed by: SHANNAN Dumont on 03/21/2022:  Common labs    Common Labsle 3/27/21 3/27/21 6/14/21 6/14/21 6/14/21 6/14/21 8/17/21    1031 1031 [id number]    Glucose  97   90     BUN  13   13     Creatinine  0.71   0.92     eGFR Non African Am  87   65     Sodium  138   138     Potassium  5.3 (A)   5.8 (A)     Chloride  101   102     Calcium  9.8   9.9     Albumin     4.30     Total Bilirubin     0.2     Alkaline Phosphatase     73     AST (SGOT)     19     ALT (SGPT)     15     WBC 7.19  5.80    6.08   Hemoglobin 12.6  12.6    13.4   Hematocrit 38.1  36.5    39.5   Platelets 292  313    325   Total Cholesterol    225 (A)      Triglycerides    534 (A)      HDL  Cholesterol    34 (A)      LDL Cholesterol     101 (A)      Hemoglobin A1C      5.00    (A) Abnormal value               Assessment and Plan   Problem List Items Addressed This Visit        Mental Health    Generalized anxiety disorder (Chronic)    Relevant Medications    buPROPion XL (Wellbutrin XL) 150 MG 24 hr tablet    DULoxetine (CYMBALTA) 60 MG capsule    ADD (attention deficit disorder) without hyperactivity    Relevant Medications    buPROPion XL (Wellbutrin XL) 150 MG 24 hr tablet    DULoxetine (CYMBALTA) 60 MG capsule      Other Visit Diagnoses     Moderate episode of recurrent major depressive disorder (HCC)  (Chronic)       Relevant Medications    buPROPion XL (Wellbutrin XL) 150 MG 24 hr tablet    DULoxetine (CYMBALTA) 60 MG capsule        Patient would like to stay on her current medications until she finds out how she feels after getting her CPAP machine.  We will continue her current dosages.  Continue Wellbutrin  mg daily for depression and ADD.  Continue Cymbalta 120 mg daily for depression and anxiety.  We will see patient again in 6 weeks to reassess.    TREATMENT PLAN/GOALS: Continue supportive psychotherapy efforts and medications as indicated. Treatment and medication options discussed during today's visit. Patient ackowledged and verbally consented to continue with current treatment plan and was educated on the importance of compliance with treatment and follow-up appointments.    DEPRESSION:  Patient screened positive for depression based on a PHQ-9 score of  on . Follow-up recommendations include: Prescribed antidepressant medication treatment.       MEDICATION ISSUES:  We discussed risks, benefits, and side effects of the above medications and the patient was agreeable with the plan. Patient was educated on the importance of compliance with treatment and follow-up appointments.  Patient is agreeable to call the office with any worsening of symptoms or onset of side effects. Patient  is agreeable to call 911 or go to the nearest ER should he/she begin having SI/HI.      Counseled patient regarding multimodal approach with healthy nutrition, healthy sleep, regular physical activity, social activities, counseling, and medications.      Coping skills reviewed and encouraged positive framing of thoughts     Assisted patient in processing above session content; acknowledged and normalized patient’s thoughts, feelings, and concerns.  Applied  positive coping skills and behavior management in session.  Allowed patient to freely discuss issues without interruption or judgment. Provided safe, confidential environment to facilitate the development of positive therapeutic relationship and encourage open, honest communication. Assisted patient in identifying risk factors which would indicate the need for higher level of care including thoughts to harm self or others and/or self-harming behavior and encouraged patient to contact this office, call 911, or present to the nearest emergency room should any of these events occur. Discussed crisis intervention services and means to access.     MEDS ORDERED DURING VISIT:  New Medications Ordered This Visit   Medications   • buPROPion XL (Wellbutrin XL) 150 MG 24 hr tablet     Sig: Take 1 tablet by mouth Every Morning for 90 days.     Dispense:  90 tablet     Refill:  0   • DULoxetine (CYMBALTA) 60 MG capsule     Sig: Take 2 capsules by mouth Daily for 90 days.     Dispense:  180 capsule     Refill:  0         Follow Up   Return in about 4 weeks (around 4/18/2022) for Video visit.    Patient was given instructions and counseling regarding her condition or for health maintenance advice. Please see specific information pulled into the AVS if appropriate.         This document has been electronically signed by SHANNAN Dumont  March 21, 2022 08:57 EDT    Part of this note may be an electronic transcription/translation of spoken language to printed text using the  Dragon Dictation System.

## 2022-04-11 ENCOUNTER — TELEMEDICINE (OUTPATIENT)
Dept: SLEEP MEDICINE | Facility: HOSPITAL | Age: 52
End: 2022-04-11

## 2022-04-11 VITALS — WEIGHT: 220 LBS | HEIGHT: 64 IN | BODY MASS INDEX: 37.56 KG/M2

## 2022-04-11 DIAGNOSIS — G47.33 MILD OBSTRUCTIVE SLEEP APNEA: Primary | ICD-10-CM

## 2022-04-11 DIAGNOSIS — F51.04 PSYCHOPHYSIOLOGICAL INSOMNIA: ICD-10-CM

## 2022-04-11 PROCEDURE — 99213 OFFICE O/P EST LOW 20 MIN: CPT | Performed by: NURSE PRACTITIONER

## 2022-04-11 NOTE — PROGRESS NOTES
Chief Complaint:   Chief Complaint   Patient presents with   • Follow-up       HPI:    Isabel Landrum is a 51 y.o. female here for follow-up of sleep apnea.  Patient was last seen in consult 6/22/2021 for excessive daytime sleepiness, snoring, and witnessed apneas.  She had a sleep study 7-21 that showed mild obstructive sleep apnea and did wish to initiate CPAP therapy.  Due to the Wendy recall patient was unable to get a machine for set up.  She has now heard from DME who says there is a machine available for her.  She needed a new visit today to qualify per insurance.  Patient states she gets approximately 7 to 8 hours nightly and is currently having difficulty going to sleep and staying asleep.  Patient states she has started a new antidepressant so does not know if this is affecting her sleep.  I have encouraged her to try extended release melatonin over-the-counter if this does not help we can move forward with a different medication on her next visit.  At this time patient has an [place] score of 13/24.  We will fax over order to DME to start CPAP.        Current medications are:   Current Outpatient Medications:   •  albuterol sulfate  (90 Base) MCG/ACT inhaler, INHALE 2 PUFFS BY MOUTH EVERY 4 HOURS AS NEEDED, Disp: 18 g, Rfl: 0  •  buPROPion XL (Wellbutrin XL) 150 MG 24 hr tablet, Take 1 tablet by mouth Every Morning for 90 days., Disp: 90 tablet, Rfl: 0  •  DULoxetine (CYMBALTA) 60 MG capsule, Take 2 capsules by mouth Daily for 90 days., Disp: 180 capsule, Rfl: 0  •  fluticasone (FLONASE) 50 MCG/ACT nasal spray, 1 spray into the nostril(s) as directed by provider Daily., Disp: 18.2 mL, Rfl: 11  •  fluticasone-salmeterol (ADVAIR DISKUS) 250-50 MCG/DOSE DISKUS, Inhale 1 puff 2 (Two) Times a Day., Disp: 60 each, Rfl: 5  •  montelukast (SINGULAIR) 10 MG tablet, TAKE 1 TABLET BY MOUTH EVERY EVENING, Disp: 90 tablet, Rfl: 3  •  triamcinolone (KENALOG) 0.5 % ointment, Apply  topically to the  appropriate area as directed 2 (Two) Times a Day. Do not apply for greater than 2 weeks., Disp: 15 g, Rfl: 2.      The patient's relevant past medical, surgical, family and social history were reviewed and updated in Epic as appropriate.       Review of Systems   Constitutional: Positive for fatigue.   Respiratory: Positive for apnea, chest tightness and wheezing.    Endocrine: Positive for heat intolerance.   Allergic/Immunologic: Positive for environmental allergies.   Neurological: Positive for headaches.   Psychiatric/Behavioral: Positive for decreased concentration, dysphoric mood and sleep disturbance. The patient is nervous/anxious.    All other systems reviewed and are negative.        Objective:    Physical Exam  Constitutional:       Appearance: Normal appearance.   HENT:      Head: Normocephalic and atraumatic.      Mouth/Throat:      Comments: Class 4 airway  Pulmonary:      Effort: Pulmonary effort is normal. No respiratory distress.   Neurological:      Mental Status: She is alert and oriented to person, place, and time.   Psychiatric:         Mood and Affect: Mood normal.         Behavior: Behavior normal.         Thought Content: Thought content normal.         Judgment: Judgment normal.           ASSESSMENT/PLAN    Diagnoses and all orders for this visit:    1. Mild obstructive sleep apnea (Primary)    2. Psychophysiological insomnia            1. Counseled patient regarding multimodal approach with healthy nutrition, healthy sleep, regular physical activity, social activities, counseling, and medications. Encouraged to practice lateral sleep position. Avoid alcohol and sedatives close to bedtime.  2.   Fax her previous order to DME I will see her back in 31 to 90 days.  She gave verbal consent today for video visit.  Patient will try over-the-counter melatonin for her insomnia.  I have reviewed the results of my evaluation and impression and discussed my recommendations in detail with the  patient.      Signed by  Amada Barry, APRN    April 11, 2022      CC: Rosalie Anton MD          No ref. provider found

## 2022-05-02 ENCOUNTER — TELEMEDICINE (OUTPATIENT)
Dept: PSYCHIATRY | Facility: CLINIC | Age: 52
End: 2022-05-02

## 2022-05-02 DIAGNOSIS — F41.1 GENERALIZED ANXIETY DISORDER: Chronic | ICD-10-CM

## 2022-05-02 DIAGNOSIS — F98.8 ADD (ATTENTION DEFICIT DISORDER) WITHOUT HYPERACTIVITY: Chronic | ICD-10-CM

## 2022-05-02 DIAGNOSIS — F33.1 MODERATE EPISODE OF RECURRENT MAJOR DEPRESSIVE DISORDER: Primary | Chronic | ICD-10-CM

## 2022-05-02 PROCEDURE — 99214 OFFICE O/P EST MOD 30 MIN: CPT

## 2022-05-02 RX ORDER — DULOXETIN HYDROCHLORIDE 60 MG/1
120 CAPSULE, DELAYED RELEASE ORAL DAILY
Qty: 180 CAPSULE | Refills: 0 | Status: SHIPPED | OUTPATIENT
Start: 2022-05-02 | End: 2022-05-31 | Stop reason: SDUPTHER

## 2022-05-02 RX ORDER — BUPROPION HYDROCHLORIDE 150 MG/1
150 TABLET ORAL EVERY MORNING
Qty: 90 TABLET | Refills: 0 | Status: SHIPPED | OUTPATIENT
Start: 2022-05-02 | End: 2022-05-31 | Stop reason: SDUPTHER

## 2022-05-02 RX ORDER — MIRTAZAPINE 15 MG/1
15 TABLET, FILM COATED ORAL NIGHTLY
Qty: 30 TABLET | Refills: 0 | Status: SHIPPED | OUTPATIENT
Start: 2022-05-02 | End: 2022-05-31 | Stop reason: HOSPADM

## 2022-05-02 NOTE — PROGRESS NOTES
This provider is located at [place], [place]. The Patient is seen remotely using Video. Patient is being seen via telehealth and confirm that they are in a secure environment for this session. Patient is located in [place], [place] at her home. The patient's condition being diagnosed/treated is appropriate for telemedicine. Provider identified as Ronny Christian as well as credentials SHANNAN MSN PMHNP-BC.   The client/patient gave consent to be seen remotely, and when consent is given they understand that the consent allows for patient identifiable information to be sent to a third party as needed.  They may refuse to be seen remotely at any time. The electronic data is encrypted and password protected, and the patient has been advised of the potential risks to privacy not withstanding such measures.    Chief Complaint  Depression, Anxiety, and ADHD    Subjective        Isabel Landrum presents to [facility] for   History of Present Illness  Patient seen today for follow-up visit for depression, anxiety, and ADHD.  Patient reports that she did get her new CPAP machine and her sleep has improved.  States she has noticed she has had more energy recently.  States she still has some days of fatigue.  States she still has some days when she wakes up a lot through the night and on those days she has less energy.  Currently rates her depression a 4-5 on a 1-10 scale with 10 being the worst.  Denies any suicidal or homicidal ideation.  Appetite is good.  Patient does states she feels she can make more improvement in area of focus and attention.  Rates her anxiety at 3-4 on a 1-10 scale with 10 being the worst.  Patient states she has some days of worry and over thinking type symptoms.  Some days of irritability.  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  Patient denies any suicidal or homicidal ideation.   Denies any paranoia.  Denies any manic type symptoms.  Denies any auditory or visual hallucinations.  Denies any side effects to the medication.  Objective   Vital Signs:   There were no vitals taken for this visit.      PHQ-9 Score:   PHQ-9 Total Score: (P) 11     Mental Status Exam:   Hygiene:   good  Cooperation:  Cooperative  Eye Contact:  Good  Psychomotor Behavior:  Appropriate  Affect:  Full range  Mood: depressed and anxious  Speech:  Normal  Thought Process:  Goal directed  Thought Content:  Normal  Suicidal:  None  Homicidal:  None  Hallucinations:  None  Delusion:  None  Memory:  Intact  Orientation:  Person, Place, Time and Situation  Reliability:  good  Insight:  Good  Judgement:  Good  Impulse Control:  Good  Physical/Medical Issues:  No      PHQ-9 Depression Screening  Little interest or pleasure in doing things? (P) 1   Feeling down, depressed, or hopeless? (P) 1   Trouble falling or staying asleep, or sleeping too much? (P) 2   Feeling tired or having little energy? (P) 2   Poor appetite or overeating? (P) 1   Feeling bad about yourself - or that you are a failure or have let yourself or your family down? (P) 1   Trouble concentrating on things, such as reading the newspaper or watching television? (P) 2   Moving or speaking so slowly that other people could have noticed? Or the opposite - being so fidgety or restless that you have been moving around a lot more than usual? (P) 1   Thoughts that you would be better off dead, or of hurting yourself in some way? (P) 0   PHQ-9 Total Score (P) 11   If you checked off any problems, how difficult have these problems made it for you to do your work, take care of things at home, or get along with other people? (P) Very difficult     PHQ-9 Total Score: (P) 11    JOEL 7 anxiety screening tool that patient filled out virtually reviewed by this APRN at today's encounter.    PROMIS scale screening tool that patient filled out virtually reviewed by this APRN at  today's encounter.    Previous Provider notes and available records reviewed by this APRN today.   Current Medications:   Current Outpatient Medications   Medication Sig Dispense Refill   • albuterol sulfate  (90 Base) MCG/ACT inhaler INHALE 2 PUFFS BY MOUTH EVERY 4 HOURS AS NEEDED 18 g 0   • buPROPion XL (Wellbutrin XL) 150 MG 24 hr tablet Take 1 tablet by mouth Every Morning for 90 days. 90 tablet 0   • DULoxetine (CYMBALTA) 60 MG capsule Take 2 capsules by mouth Daily for 90 days. 180 capsule 0   • fluticasone (FLONASE) 50 MCG/ACT nasal spray 1 spray into the nostril(s) as directed by provider Daily. 18.2 mL 11   • fluticasone-salmeterol (ADVAIR DISKUS) 250-50 MCG/DOSE DISKUS Inhale 1 puff 2 (Two) Times a Day. 60 each 5   • mirtazapine (Remeron) 15 MG tablet Take 1 tablet by mouth Every Night for 30 days. 30 tablet 0   • montelukast (SINGULAIR) 10 MG tablet TAKE 1 TABLET BY MOUTH EVERY EVENING 90 tablet 3   • triamcinolone (KENALOG) 0.5 % ointment Apply  topically to the appropriate area as directed 2 (Two) Times a Day. Do not apply for greater than 2 weeks. 15 g 2     No current facility-administered medications for this visit.       Physical Exam   Result Review :    The following data was reviewed by: SHANNAN Dumont on 05/02/2022:  Common labs    Common Labsle 6/14/21 6/14/21 6/14/21 6/14/21 8/17/21    0952 0952 0952 0952    Glucose   90     BUN   13     Creatinine   0.92     eGFR Non African Am   65     Sodium   138     Potassium   5.8 (A)     Chloride   102     Calcium   9.9     Albumin   4.30     Total Bilirubin   0.2     Alkaline Phosphatase   73     AST (SGOT)   19     ALT (SGPT)   15     WBC 5.80    6.08   Hemoglobin 12.6    13.4   Hematocrit 36.5    39.5   Platelets 313    325   Total Cholesterol  225 (A)      Triglycerides  534 (A)      HDL Cholesterol  34 (A)      LDL Cholesterol   101 (A)      Hemoglobin A1C    5.00    (A) Abnormal value               Assessment and Plan   Problem List  Items Addressed This Visit        Mental Health    Generalized anxiety disorder (Chronic)    Relevant Medications    buPROPion XL (Wellbutrin XL) 150 MG 24 hr tablet    DULoxetine (CYMBALTA) 60 MG capsule    mirtazapine (Remeron) 15 MG tablet    ADD (attention deficit disorder) without hyperactivity    Relevant Medications    buPROPion XL (Wellbutrin XL) 150 MG 24 hr tablet    DULoxetine (CYMBALTA) 60 MG capsule    mirtazapine (Remeron) 15 MG tablet      Other Visit Diagnoses     Moderate episode of recurrent major depressive disorder (HCC)    -  Primary    Relevant Medications    buPROPion XL (Wellbutrin XL) 150 MG 24 hr tablet    DULoxetine (CYMBALTA) 60 MG capsule    mirtazapine (Remeron) 15 MG tablet        Discussed treatment options with patient.  Discussed with patient that we could increase her Wellbutrin XL to 300 mg daily to help with her symptoms of focus and attention.  Also discussed that we could add Remeron to help with her sleep to see if any of her symptoms are related to her sleep problems since the days that she slept she felt better.  Patient states she would like to keep the Wellbutrin XL at 150 mg daily for depression and ADHD at this time.  Patient would like to try something to help her sleep.  Start Remeron 15 mg nightly for sleep.  Continue Cymbalta 120 mg daily for depression and anxiety.  Patient did state that once we get her depression and anxiety symptoms under control and if the attention problems remain, that she would be interested in talking about stimulant medication.  Discussed with patient that we can discuss that as we go.  We will see patient again in 4 weeks to reassess.    TREATMENT PLAN/GOALS: Continue supportive psychotherapy efforts and medications as indicated. Treatment and medication options discussed during today's visit. Patient ackowledged and verbally consented to continue with current treatment plan and was educated on the importance of compliance with treatment  and follow-up appointments.    DEPRESSION:  Patient screened positive for depression based on a PHQ-9 score of  on . Follow-up recommendations include: Prescribed antidepressant medication treatment.       MEDICATION ISSUES:  We discussed risks, benefits, and side effects of the above medications and the patient was agreeable with the plan. Patient was educated on the importance of compliance with treatment and follow-up appointments.  Patient is agreeable to call the office with any worsening of symptoms or onset of side effects. Patient is agreeable to call 911 or go to the nearest ER should he/she begin having SI/HI.      Counseled patient regarding multimodal approach with healthy nutrition, healthy sleep, regular physical activity, social activities, counseling, and medications.      Coping skills reviewed and encouraged positive framing of thoughts     Assisted patient in processing above session content; acknowledged and normalized patient’s thoughts, feelings, and concerns.  Applied  positive coping skills and behavior management in session.  Allowed patient to freely discuss issues without interruption or judgment. Provided safe, confidential environment to facilitate the development of positive therapeutic relationship and encourage open, honest communication. Assisted patient in identifying risk factors which would indicate the need for higher level of care including thoughts to harm self or others and/or self-harming behavior and encouraged patient to contact this office, call 911, or present to the nearest emergency room should any of these events occur. Discussed crisis intervention services and means to access.     MEDS ORDERED DURING VISIT:  New Medications Ordered This Visit   Medications   • buPROPion XL (Wellbutrin XL) 150 MG 24 hr tablet     Sig: Take 1 tablet by mouth Every Morning for 90 days.     Dispense:  90 tablet     Refill:  0   • DULoxetine (CYMBALTA) 60 MG capsule     Sig: Take 2  capsules by mouth Daily for 90 days.     Dispense:  180 capsule     Refill:  0   • mirtazapine (Remeron) 15 MG tablet     Sig: Take 1 tablet by mouth Every Night for 30 days.     Dispense:  30 tablet     Refill:  0         Follow Up   Return in about 4 weeks (around 5/30/2022) for Video visit.    Patient was given instructions and counseling regarding her condition or for health maintenance advice. Please see specific information pulled into the AVS if appropriate.         This document has been electronically signed by SHANNAN Dumont  May 2, 2022 10:06 EDT    Part of this note may be an electronic transcription/translation of spoken language to printed text using the Dragon Dictation System.

## 2022-05-31 ENCOUNTER — TELEMEDICINE (OUTPATIENT)
Dept: PSYCHIATRY | Facility: CLINIC | Age: 52
End: 2022-05-31

## 2022-05-31 DIAGNOSIS — F41.1 GENERALIZED ANXIETY DISORDER: Chronic | ICD-10-CM

## 2022-05-31 DIAGNOSIS — F33.1 MODERATE EPISODE OF RECURRENT MAJOR DEPRESSIVE DISORDER: Chronic | ICD-10-CM

## 2022-05-31 DIAGNOSIS — F98.8 ADD (ATTENTION DEFICIT DISORDER) WITHOUT HYPERACTIVITY: Chronic | ICD-10-CM

## 2022-05-31 DIAGNOSIS — F51.05 INSOMNIA DUE TO MENTAL CONDITION: Primary | ICD-10-CM

## 2022-05-31 PROCEDURE — 99214 OFFICE O/P EST MOD 30 MIN: CPT

## 2022-05-31 RX ORDER — TRAZODONE HYDROCHLORIDE 50 MG/1
50 TABLET ORAL NIGHTLY
Qty: 90 TABLET | Refills: 0 | Status: SHIPPED | OUTPATIENT
Start: 2022-05-31 | End: 2022-06-30 | Stop reason: SDUPTHER

## 2022-05-31 RX ORDER — BUPROPION HYDROCHLORIDE 300 MG/1
300 TABLET ORAL EVERY MORNING
Qty: 90 TABLET | Refills: 0 | Status: SHIPPED | OUTPATIENT
Start: 2022-05-31 | End: 2022-06-30 | Stop reason: SDUPTHER

## 2022-05-31 RX ORDER — DULOXETIN HYDROCHLORIDE 60 MG/1
120 CAPSULE, DELAYED RELEASE ORAL DAILY
Qty: 180 CAPSULE | Refills: 0 | Status: SHIPPED | OUTPATIENT
Start: 2022-05-31 | End: 2022-06-30 | Stop reason: SDUPTHER

## 2022-05-31 NOTE — PROGRESS NOTES
This provider is located at [place], [place]. The Patient is seen remotely using Video. Patient is being seen via telehealth and confirm that they are in a secure environment for this session. Patient is located in [place], [place] at her home. The patient's condition being diagnosed/treated is appropriate for telemedicine. Provider identified as Ronny Christian as well as credentials SHANNAN DOW PMHNP-BC.   The client/patient gave consent to be seen remotely, and when consent is given they understand that the consent allows for patient identifiable information to be sent to a third party as needed.  They may refuse to be seen remotely at any time. The electronic data is encrypted and password protected, and the patient has been advised of the potential risks to privacy not withstanding such measures.    Chief Complaint  Depression, Anxiety, and ADHD    Subjective        Isabel Landrum presents to [facility] for   History of Present Illness  Patient seen today for follow-up visit for depression, anxiety, and ADHD.  Patient reports that she got her new CPAP machine.  She states that has been helpful with her sleep and energy level.  States there are still some nights that she takes it off because it is uncomfortable, but overall it is doing good.  Patient reports the Remeron has helped her sleep, but states it has increased her appetite.  States she has had about 5 pounds of weight gain over the last month.  Currently rates her depression a 2-3 on a 1-10 scale with 10 being the worst.  Feels that her depression is in a good place at this point.  Denies any suicidal or homicidal ideation.  Sleep is improved as stated above.  Appetite has been increased due to the Remeron.  Currently rates her anxiety a 3 on a 1-10 scale with 10 being the worst.  States she has not had any panic attacks.  States she does have some bad days when her anxiety is worse, but usually that is  situational.  States overall she is pleased with where her anxiety and depression are currently.  She states she does want to gain improvement in focus and attention.  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  States she has trouble getting started on those projects.  States when this happens, then it increases her anxiety as she knows she has to get it done.  Denies any paranoia.  Denies any auditory or visual hallucinations.  Denies any manic type symptoms.  Outside of the weight gain with Remeron, denies any side effects to the medication.  Objective   Vital Signs:   There were no vitals taken for this visit.      PHQ-9 Score:   PHQ-9 Total Score: (P) 9     Mental Status Exam:   Hygiene:   good  Cooperation:  Cooperative  Eye Contact:  Good  Psychomotor Behavior:  Appropriate  Affect:  Full range  Mood: normal  Speech:  Normal  Thought Process:  Goal directed  Thought Content:  Normal  Suicidal:  None  Homicidal:  None  Hallucinations:  None  Delusion:  None  Memory:  Intact  Orientation:  Person, Place, Time and Situation  Reliability:  good  Insight:  Good  Judgement:  Good  Impulse Control:  Good  Physical/Medical Issues:  No      PHQ-9 Depression Screening  Little interest or pleasure in doing things? (P) 1   Feeling down, depressed, or hopeless? (P) 1   Trouble falling or staying asleep, or sleeping too much? (P) 1   Feeling tired or having little energy? (P) 1   Poor appetite or overeating? (P) 2   Feeling bad about yourself - or that you are a failure or have let yourself or your family down? (P) 1   Trouble concentrating on things, such as reading the newspaper or watching television? (P) 1   Moving or speaking so slowly that other people could have noticed? Or the opposite - being so fidgety or restless that you have been moving around a lot more than usual? (P) 1   Thoughts that you would be better off dead, or of hurting yourself in some way? (P) 0   PHQ-9 Total Score (P) 9    If you checked off any problems, how difficult have these problems made it for you to do your work, take care of things at home, or get along with other people? (P) Somewhat difficult     PHQ-9 Total Score: (P) 9    JOEL 7 anxiety screening tool that patient filled out virtually reviewed by this APRN at today's encounter.    PROMIS scale screening tool that patient filled out virtually reviewed by this APRN at today's encounter.    Previous Provider notes and available records reviewed by this APRN today.   Current Medications:   Current Outpatient Medications   Medication Sig Dispense Refill   • albuterol sulfate  (90 Base) MCG/ACT inhaler INHALE 2 PUFFS BY MOUTH EVERY 4 HOURS AS NEEDED 18 g 0   • buPROPion XL (Wellbutrin XL) 300 MG 24 hr tablet Take 1 tablet by mouth Every Morning for 90 days. 90 tablet 0   • DULoxetine (CYMBALTA) 60 MG capsule Take 2 capsules by mouth Daily for 90 days. 180 capsule 0   • fluticasone (FLONASE) 50 MCG/ACT nasal spray 1 spray into the nostril(s) as directed by provider Daily. 18.2 mL 11   • fluticasone-salmeterol (ADVAIR DISKUS) 250-50 MCG/DOSE DISKUS Inhale 1 puff 2 (Two) Times a Day. 60 each 5   • montelukast (SINGULAIR) 10 MG tablet TAKE 1 TABLET BY MOUTH EVERY EVENING 90 tablet 3   • traZODone (DESYREL) 50 MG tablet Take 1 tablet by mouth Every Night. 90 tablet 0   • triamcinolone (KENALOG) 0.5 % ointment Apply  topically to the appropriate area as directed 2 (Two) Times a Day. Do not apply for greater than 2 weeks. 15 g 2     No current facility-administered medications for this visit.       Physical Exam   Result Review :    The following data was reviewed by: SHANNAN Dumont on 05/31/2022:  Common labs    Common Labsle 6/14/21 6/14/21 6/14/21 6/14/21 8/17/21    0952 0952 0952 0952    Glucose   90     BUN   13     Creatinine   0.92     eGFR Non African Am   65     Sodium   138     Potassium   5.8 (A)     Chloride   102     Calcium   9.9     Albumin   4.30      Total Bilirubin   0.2     Alkaline Phosphatase   73     AST (SGOT)   19     ALT (SGPT)   15     WBC 5.80    6.08   Hemoglobin 12.6    13.4   Hematocrit 36.5    39.5   Platelets 313    325   Total Cholesterol  225 (A)      Triglycerides  534 (A)      HDL Cholesterol  34 (A)      LDL Cholesterol   101 (A)      Hemoglobin A1C    5.00    (A) Abnormal value               Assessment and Plan   Problem List Items Addressed This Visit        Mental Health    Generalized anxiety disorder (Chronic)    Relevant Medications    buPROPion XL (Wellbutrin XL) 300 MG 24 hr tablet    traZODone (DESYREL) 50 MG tablet    DULoxetine (CYMBALTA) 60 MG capsule    Moderate episode of recurrent major depressive disorder (HCC) (Chronic)    Relevant Medications    buPROPion XL (Wellbutrin XL) 300 MG 24 hr tablet    traZODone (DESYREL) 50 MG tablet    DULoxetine (CYMBALTA) 60 MG capsule    ADD (attention deficit disorder) without hyperactivity    Relevant Medications    buPROPion XL (Wellbutrin XL) 300 MG 24 hr tablet    traZODone (DESYREL) 50 MG tablet    DULoxetine (CYMBALTA) 60 MG capsule      Other Visit Diagnoses     Insomnia due to mental condition    -  Primary    Relevant Medications    buPROPion XL (Wellbutrin XL) 300 MG 24 hr tablet    traZODone (DESYREL) 50 MG tablet    DULoxetine (CYMBALTA) 60 MG capsule        Discussed treatment options with patient.  Patient is pleased where her depression and anxiety are currently.  Did discuss with patient that we could change Remeron to trazodone for her sleep.  Informed her trazodone would not have the weight gain associated with it that Remeron does.  Patient would like to do that.  Discontinue Remeron.  Start trazodone 50 mg nightly as needed for sleep.  Continue Cymbalta 120 mg daily for depression and anxiety.  Did discuss with patient that we could increase Wellbutrin to try to achieve more control of her focus and attention symptoms.  Patient would like to do so.  Increase Wellbutrin  XL to 300 mg daily for depression and ADHD.  We will see patient again in 4 weeks to reassess.    TREATMENT PLAN/GOALS: Continue supportive psychotherapy efforts and medications as indicated. Treatment and medication options discussed during today's visit. Patient ackowledged and verbally consented to continue with current treatment plan and was educated on the importance of compliance with treatment and follow-up appointments.    DEPRESSION:  Patient screened positive for depression based on a PHQ-9 score of  on . Follow-up recommendations include: Prescribed antidepressant medication treatment.       MEDICATION ISSUES:  We discussed risks, benefits, and side effects of the above medications and the patient was agreeable with the plan. Patient was educated on the importance of compliance with treatment and follow-up appointments.  Patient is agreeable to call the office with any worsening of symptoms or onset of side effects. Patient is agreeable to call 911 or go to the nearest ER should he/she begin having SI/HI.      Counseled patient regarding multimodal approach with healthy nutrition, healthy sleep, regular physical activity, social activities, counseling, and medications.      Coping skills reviewed and encouraged positive framing of thoughts     Assisted patient in processing above session content; acknowledged and normalized patient’s thoughts, feelings, and concerns.  Applied  positive coping skills and behavior management in session.  Allowed patient to freely discuss issues without interruption or judgment. Provided safe, confidential environment to facilitate the development of positive therapeutic relationship and encourage open, honest communication. Assisted patient in identifying risk factors which would indicate the need for higher level of care including thoughts to harm self or others and/or self-harming behavior and encouraged patient to contact this office, call 911, or present to the nearest  emergency room should any of these events occur. Discussed crisis intervention services and means to access. If patient has any concerns or needs assistance they were instructed to call the [facility] at [phone].    MEDS ORDERED DURING VISIT:  New Medications Ordered This Visit   Medications   • buPROPion XL (Wellbutrin XL) 300 MG 24 hr tablet     Sig: Take 1 tablet by mouth Every Morning for 90 days.     Dispense:  90 tablet     Refill:  0   • traZODone (DESYREL) 50 MG tablet     Sig: Take 1 tablet by mouth Every Night.     Dispense:  90 tablet     Refill:  0   • DULoxetine (CYMBALTA) 60 MG capsule     Sig: Take 2 capsules by mouth Daily for 90 days.     Dispense:  180 capsule     Refill:  0         Follow Up   Return in about 4 weeks (around 6/28/2022) for Video visit.    Patient was given instructions and counseling regarding her condition or for health maintenance advice. Please see specific information pulled into the AVS if appropriate.         This document has been electronically signed by SHANNAN Dumont  May 31, 2022 08:56 EDT    Part of this note may be an electronic transcription/translation of spoken language to printed text using the Dragon Dictation System.

## 2022-06-02 DIAGNOSIS — J45.20 MILD INTERMITTENT ASTHMA WITHOUT COMPLICATION: ICD-10-CM

## 2022-06-02 RX ORDER — ALBUTEROL SULFATE 90 UG/1
2 AEROSOL, METERED RESPIRATORY (INHALATION) EVERY 4 HOURS PRN
Qty: 18 G | Refills: 1 | Status: SHIPPED | OUTPATIENT
Start: 2022-06-02

## 2022-06-02 NOTE — TELEPHONE ENCOUNTER
Caller: Isabel Landrum    Relationship: Self    Best call back number: [phone]    Requested Prescriptions:   Requested Prescriptions     Pending Prescriptions Disp Refills   • albuterol sulfate  (90 Base) MCG/ACT inhaler 18 g 0     Sig: Inhale 2 puffs Every 4 (Four) Hours As Needed.   REQUESTING AN ALTERNATIVE INHALER.  THIS IS NO LONGER COVERED BY HER INSURANCE     ALSO  REQUESTING TO RESUME  TAKING     fluticasone-salmeterol (ADVAIR DISKUS) 250-50 MCG/DOSE     Pharmacy where request should be sent:    [id number] [place], [place] - [address] [place] [address]  [place] [facility] [phone] [facility] [phone] FX        Additional details provided by patient:     Does the patient have less than a 3 day supply:  [x] Yes  [] No    Aracely Collins Rep   06/02/22 11:43 EDT

## 2022-06-30 ENCOUNTER — TELEMEDICINE (OUTPATIENT)
Dept: PSYCHIATRY | Facility: CLINIC | Age: 52
End: 2022-06-30

## 2022-06-30 DIAGNOSIS — F41.1 GENERALIZED ANXIETY DISORDER: ICD-10-CM

## 2022-06-30 DIAGNOSIS — F98.8 ADD (ATTENTION DEFICIT DISORDER) WITHOUT HYPERACTIVITY: ICD-10-CM

## 2022-06-30 DIAGNOSIS — F33.1 MODERATE EPISODE OF RECURRENT MAJOR DEPRESSIVE DISORDER: Primary | ICD-10-CM

## 2022-06-30 DIAGNOSIS — F51.05 INSOMNIA DUE TO MENTAL CONDITION: ICD-10-CM

## 2022-06-30 PROCEDURE — 99213 OFFICE O/P EST LOW 20 MIN: CPT

## 2022-06-30 RX ORDER — BUPROPION HYDROCHLORIDE 300 MG/1
300 TABLET ORAL EVERY MORNING
Qty: 90 TABLET | Refills: 0 | Status: SHIPPED | OUTPATIENT
Start: 2022-06-30 | End: 2022-09-28 | Stop reason: SDUPTHER

## 2022-06-30 RX ORDER — TRAZODONE HYDROCHLORIDE 50 MG/1
50 TABLET ORAL NIGHTLY
Qty: 90 TABLET | Refills: 0 | Status: SHIPPED | OUTPATIENT
Start: 2022-06-30 | End: 2022-09-28 | Stop reason: SDUPTHER

## 2022-06-30 RX ORDER — DULOXETIN HYDROCHLORIDE 60 MG/1
120 CAPSULE, DELAYED RELEASE ORAL DAILY
Qty: 180 CAPSULE | Refills: 0 | Status: SHIPPED | OUTPATIENT
Start: 2022-06-30 | End: 2022-09-28 | Stop reason: SDUPTHER

## 2022-06-30 NOTE — PROGRESS NOTES
This provider is located at [place], [place]. The Patient is seen remotely using Video. Patient is being seen via telehealth and confirm that they are in a secure environment for this session. Patient is located in [place], [place] at her home. The patient's condition being diagnosed/treated is appropriate for telemedicine. Provider identified as Ronny Christian as well as credentials SHANNAN DOW PMHNP-BC.   The client/patient gave consent to be seen remotely, and when consent is given they understand that the consent allows for patient identifiable information to be sent to a third party as needed.  They may refuse to be seen remotely at any time. The electronic data is encrypted and password protected, and the patient has been advised of the potential risks to privacy not withstanding such measures.    Chief Complaint  Depression, Anxiety, ADHD, and Sleeping Problem    Subjective        Isabel Landrum presents to [facility] for   History of Present Illness  Patient is seen today for follow-up visit for depression, anxiety, ADHD, and insomnia.  Patient reports she is doing well.  She states her depression is a 2-3 on a 1-10 scale with 10 being the worst.  Denies any hopelessness.  Denies any sadness.  Denies any suicidal or homicidal ideation.  States she feels like she has increased energy now.  She states she has been trying to wear her CPAP more and feels the trazodone has been helpful for her sleep.  Appetite is improved and she is not overeating as she was while taking the Remeron.  Rates her anxiety at 2-3 on a 1-10 scale with 10 being the worst.  States she is really not experienced a lot of anxiety recently.  Denies any manic type symptoms.  Denies any paranoia.  Denies any auditory or visual hallucinations.  States they have had some increased stressors and she feels she has dealt with those appropriately.  States her 's daughter had to have back surgery and  relapsed on alcohol after 11 years sobriety and they have been helping her through that.  She states she has a trip to [facility] planned with her mother and is excited about that.  She states school is going to be starting back and she request to be seen a month after she has been started back to school so she can gauge where her medications are.  Denies any side effects to the medications.  Objective   Vital Signs:   There were no vitals taken for this visit.      Mental Status Exam:   Hygiene:   good  Cooperation:  Cooperative  Eye Contact:  Good  Psychomotor Behavior:  Appropriate  Affect:  Full range  Mood: normal  Speech:  Normal  Thought Process:  Goal directed  Thought Content:  Normal  Suicidal:  None  Homicidal:  None  Hallucinations:  None  Delusion:  None  Memory:  Intact  Orientation:  Person, Place, Time and Situation  Reliability:  good  Insight:  Good  Judgement:  Good  Impulse Control:  Good  Physical/Medical Issues:  No      PHQ-2 Depression Screening  Little interest or pleasure in doing things? 0-->not at all   Feeling down, depressed, or hopeless? 0-->not at all   PHQ-2 Total Score 0       JOEL 7 anxiety screening tool that patient filled out virtually reviewed by this APRN at today's encounter.    PROMIS scale screening tool that patient filled out virtually reviewed by this APRN at today's encounter.    Previous Provider notes and available records reviewed by this APRN today.   Current Medications:   Current Outpatient Medications   Medication Sig Dispense Refill   • albuterol sulfate  (90 Base) MCG/ACT inhaler Inhale 2 puffs Every 4 (Four) Hours As Needed for Wheezing or Shortness of Air. 18 g 1   • buPROPion XL (Wellbutrin XL) 300 MG 24 hr tablet Take 1 tablet by mouth Every Morning for 90 days. 90 tablet 0   • DULoxetine (CYMBALTA) 60 MG capsule Take 2 capsules by mouth Daily for 90 days. 180 capsule 0   • fluticasone (FLONASE) 50 MCG/ACT nasal spray 1 spray into the nostril(s) as  directed by provider Daily. 18.2 mL 11   • fluticasone-salmeterol (ADVAIR DISKUS) 250-50 MCG/DOSE DISKUS Inhale 1 puff 2 (Two) Times a Day. 60 each 5   • montelukast (SINGULAIR) 10 MG tablet TAKE 1 TABLET BY MOUTH EVERY EVENING 90 tablet 3   • traZODone (DESYREL) 50 MG tablet Take 1 tablet by mouth Every Night. 90 tablet 0   • triamcinolone (KENALOG) 0.5 % ointment Apply  topically to the appropriate area as directed 2 (Two) Times a Day. Do not apply for greater than 2 weeks. 15 g 2     No current facility-administered medications for this visit.       Physical Exam   Result Review :    The following data was reviewed by: SHANNAN Dumont on 06/30/2022:  Common labs    Common Labsle 8/17/21   WBC 6.08   Hemoglobin 13.4   Hematocrit 39.5   Platelets 325              Assessment and Plan   Problem List Items Addressed This Visit        Mental Health    Generalized anxiety disorder (Chronic)    Relevant Medications    DULoxetine (CYMBALTA) 60 MG capsule    traZODone (DESYREL) 50 MG tablet    buPROPion XL (Wellbutrin XL) 300 MG 24 hr tablet    Moderate episode of recurrent major depressive disorder (HCC) - Primary (Chronic)    Relevant Medications    DULoxetine (CYMBALTA) 60 MG capsule    traZODone (DESYREL) 50 MG tablet    buPROPion XL (Wellbutrin XL) 300 MG 24 hr tablet    ADD (attention deficit disorder) without hyperactivity    Relevant Medications    DULoxetine (CYMBALTA) 60 MG capsule    traZODone (DESYREL) 50 MG tablet    buPROPion XL (Wellbutrin XL) 300 MG 24 hr tablet      Other Visit Diagnoses     Insomnia due to mental condition        Relevant Medications    DULoxetine (CYMBALTA) 60 MG capsule    traZODone (DESYREL) 50 MG tablet    buPROPion XL (Wellbutrin XL) 300 MG 24 hr tablet        Discussed treatment options with patient.  Patient reports she is doing well and is pleased with her current medications.  Continue Cymbalta 60 mg daily for depression and anxiety.  Continue trazodone 50 mg nightly for  sleep.  Continue Wellbutrin  mg daily for depression/ADHD.  Patient request to be seen in 3 months after school has started back so she can engage how her medications are doing.  We will see in 3 months to reassess.    TREATMENT PLAN/GOALS: Continue supportive psychotherapy efforts and medications as indicated. Treatment and medication options discussed during today's visit. Patient ackowledged and verbally consented to continue with current treatment plan and was educated on the importance of compliance with treatment and follow-up appointments.    DEPRESSION:  Patient screened positive for depression based on a PHQ-9 score of  on . Follow-up recommendations include: Prescribed antidepressant medication treatment.       MEDICATION ISSUES:  We discussed risks, benefits, and side effects of the above medications and the patient was agreeable with the plan. Patient was educated on the importance of compliance with treatment and follow-up appointments.  Patient is agreeable to call the office with any worsening of symptoms or onset of side effects. Patient is agreeable to call 911 or go to the nearest ER should he/she begin having SI/HI.      Counseled patient regarding multimodal approach with healthy nutrition, healthy sleep, regular physical activity, social activities, counseling, and medications.      Coping skills reviewed and encouraged positive framing of thoughts     Assisted patient in processing above session content; acknowledged and normalized patient’s thoughts, feelings, and concerns.  Applied  positive coping skills and behavior management in session.  Allowed patient to freely discuss issues without interruption or judgment. Provided safe, confidential environment to facilitate the development of positive therapeutic relationship and encourage open, honest communication. Assisted patient in identifying risk factors which would indicate the need for higher level of care including thoughts to harm  self or others and/or self-harming behavior and encouraged patient to contact this office, call 911, or present to the nearest emergency room should any of these events occur. Discussed crisis intervention services and means to access. If patient has any concerns or needs assistance they were instructed to call the [facility] at [phone].    MEDS ORDERED DURING VISIT:  New Medications Ordered This Visit   Medications   • DULoxetine (CYMBALTA) 60 MG capsule     Sig: Take 2 capsules by mouth Daily for 90 days.     Dispense:  180 capsule     Refill:  0   • traZODone (DESYREL) 50 MG tablet     Sig: Take 1 tablet by mouth Every Night.     Dispense:  90 tablet     Refill:  0   • buPROPion XL (Wellbutrin XL) 300 MG 24 hr tablet     Sig: Take 1 tablet by mouth Every Morning for 90 days.     Dispense:  90 tablet     Refill:  0         Follow Up   Return in about 3 months (around 9/30/2022) for Video visit.    Patient was given instructions and counseling regarding her condition or for health maintenance advice. Please see specific information pulled into the AVS if appropriate.         This document has been electronically signed by SHANNAN Dumont  June 30, 2022 08:47 EDT    Part of this note may be an electronic transcription/translation of spoken language to printed text using the Dragon Dictation System.

## 2022-07-22 ENCOUNTER — OFFICE VISIT (OUTPATIENT)
Dept: FAMILY MEDICINE CLINIC | Facility: CLINIC | Age: 52
End: 2022-07-22

## 2022-07-22 VITALS
WEIGHT: 220 LBS | HEART RATE: 89 BPM | OXYGEN SATURATION: 99 % | HEIGHT: 64 IN | DIASTOLIC BLOOD PRESSURE: 78 MMHG | TEMPERATURE: 97.7 F | BODY MASS INDEX: 37.56 KG/M2 | SYSTOLIC BLOOD PRESSURE: 120 MMHG

## 2022-07-22 DIAGNOSIS — R19.7 DIARRHEA, UNSPECIFIED TYPE: Primary | ICD-10-CM

## 2022-07-22 PROCEDURE — 99213 OFFICE O/P EST LOW 20 MIN: CPT | Performed by: PHYSICIAN ASSISTANT

## 2022-07-22 NOTE — PROGRESS NOTES
Answers for HPI/ROS submitted by the patient on 2022  Please describe your symptoms.: Persistent diarrhea (almost 3 weeks) and stomach upset.  Have you had these symptoms before?: Yes  How long have you been having these symptoms?: Greater than 2 weeks  Please list any medications you are currently taking for this condition.: Occasional Imodium.  Please describe any probable cause for these symptoms. : I honestly have no idea. It does not seems to vary with my food choices.  What is the primary reason for your visit?: Other         Follow Up Office Visit      Date: 2022   Patient Name: Isabel Landrum  : 1970   MRN: [id number]     Chief Complaint:    Chief Complaint   Patient presents with   • Diarrhea     For 3 weeks       History of Present Illness: Isabel Landrum is a 51 y.o. female who is here today to follow up with diarrhea for 3 weeks.  Diarrhea is mostly watery.  She denies any antibiotics in the past 4 months.  No unusual travel, no unusual foods.  No blood in the diarrhea.  Cramping only occurs with the diarrhea does not hurt or cramp otherwise.  No nausea or vomiting.  No fevers.  No new medications.  Other than episodes of diarrhea she feels fine.  Imodium does control the diarrhea.      Subjective      Review of systems:  Review of Systems     I have reviewed and the following portions of the patient's history were updated as appropriate: past family history, past medical history, past social history, past surgical history and problem list.    Medications:     Current Outpatient Medications:   •  albuterol sulfate  (90 Base) MCG/ACT inhaler, Inhale 2 puffs Every 4 (Four) Hours As Needed for Wheezing or Shortness of Air., Disp: 18 g, Rfl: 1  •  buPROPion XL (Wellbutrin XL) 300 MG 24 hr tablet, Take 1 tablet by mouth Every Morning for 90 days., Disp: 90 tablet, Rfl: 0  •  DULoxetine (CYMBALTA) 60 MG capsule, Take 2 capsules by mouth Daily for 90 days., Disp: 180  "capsule, Rfl: 0  •  fluticasone (FLONASE) 50 MCG/ACT nasal spray, 1 spray into the nostril(s) as directed by provider Daily., Disp: 18.2 mL, Rfl: 11  •  fluticasone-salmeterol (ADVAIR DISKUS) 250-50 MCG/DOSE DISKUS, Inhale 1 puff 2 (Two) Times a Day., Disp: 60 each, Rfl: 5  •  montelukast (SINGULAIR) 10 MG tablet, TAKE 1 TABLET BY MOUTH EVERY EVENING, Disp: 90 tablet, Rfl: 3  •  traZODone (DESYREL) 50 MG tablet, Take 1 tablet by mouth Every Night., Disp: 90 tablet, Rfl: 0  •  triamcinolone (KENALOG) 0.5 % ointment, Apply  topically to the appropriate area as directed 2 (Two) Times a Day. Do not apply for greater than 2 weeks., Disp: 15 g, Rfl: 2    Allergies:   Allergies   Allergen Reactions   • Grass Itching       Objective     Vital Signs:   Vitals:    07/22/22 0806   BP: 120/78   Pulse: 89   Temp: 97.7 °F (36.5 °C)   SpO2: 99%   Weight: 99.8 kg (220 lb)   Height: 162.6 cm (64\")   PainSc: 0-No pain     Body mass index is 37.76 kg/m².   Class 2 Severe Obesity (BMI >=35 and <=39.9). Obesity-related health conditions include the following: obstructive sleep apnea. Obesity is unchanged. BMI is is above average; BMI management plan is completed. We discussed low calorie, low carb based diet program, portion control and increasing exercise.      Physical Exam:   Physical Exam  Vitals and nursing note reviewed.   Constitutional:       Appearance: Normal appearance.   HENT:      Head: Normocephalic and atraumatic.      Nose: No congestion or rhinorrhea.      Mouth/Throat:      Pharynx: No posterior oropharyngeal erythema.   Cardiovascular:      Rate and Rhythm: Normal rate and regular rhythm.   Pulmonary:      Effort: Pulmonary effort is normal.      Breath sounds: Normal breath sounds. No decreased breath sounds, wheezing, rhonchi or rales.   Abdominal:      General: There is no distension.      Palpations: Abdomen is soft.      Tenderness: There is no abdominal tenderness. There is no guarding or rebound. "   Musculoskeletal:      Cervical back: Neck supple.      Right lower leg: No edema.      Left lower leg: No edema.   Lymphadenopathy:      Cervical: No cervical adenopathy.   Neurological:      Mental Status: She is alert.          Assessment / Plan      Assessment/Plan:   Diagnoses and all orders for this visit:    1. Diarrhea, unspecified type (Primary)  -     Gastrointestinal Panel, PCR - Stool, Per Rectum; Future  -     Clostridium Difficile Toxin, PCR - Stool, Per Rectum; Future     Stool studies. Advised probiotic, fiber tablets, keep well hydrated, BRAT diet, and may continue Immodium.    Follow Up:   No follow-ups on file.    Paloma Estrella PA-C   [facility] [place] [place]

## 2022-09-28 ENCOUNTER — TELEMEDICINE (OUTPATIENT)
Dept: PSYCHIATRY | Facility: CLINIC | Age: 52
End: 2022-09-28

## 2022-09-28 DIAGNOSIS — F51.05 INSOMNIA DUE TO MENTAL CONDITION: ICD-10-CM

## 2022-09-28 DIAGNOSIS — F98.8 ADD (ATTENTION DEFICIT DISORDER) WITHOUT HYPERACTIVITY: ICD-10-CM

## 2022-09-28 DIAGNOSIS — F33.1 MODERATE EPISODE OF RECURRENT MAJOR DEPRESSIVE DISORDER: Chronic | ICD-10-CM

## 2022-09-28 DIAGNOSIS — F41.1 GENERALIZED ANXIETY DISORDER: Chronic | ICD-10-CM

## 2022-09-28 PROCEDURE — 99214 OFFICE O/P EST MOD 30 MIN: CPT

## 2022-09-28 RX ORDER — BUPROPION HYDROCHLORIDE 150 MG/1
TABLET ORAL
Qty: 90 TABLET | Refills: 0 | Status: SHIPPED | OUTPATIENT
Start: 2022-09-28 | End: 2022-12-05

## 2022-09-28 RX ORDER — DULOXETIN HYDROCHLORIDE 60 MG/1
120 CAPSULE, DELAYED RELEASE ORAL DAILY
Qty: 180 CAPSULE | Refills: 0 | Status: SHIPPED | OUTPATIENT
Start: 2022-09-28 | End: 2022-11-22 | Stop reason: SDUPTHER

## 2022-09-28 RX ORDER — BUPROPION HYDROCHLORIDE 300 MG/1
TABLET ORAL
Qty: 90 TABLET | Refills: 0 | Status: SHIPPED | OUTPATIENT
Start: 2022-09-28 | End: 2022-12-05 | Stop reason: SDUPTHER

## 2022-09-28 RX ORDER — TRAZODONE HYDROCHLORIDE 50 MG/1
50 TABLET ORAL NIGHTLY
Qty: 90 TABLET | Refills: 0 | Status: SHIPPED | OUTPATIENT
Start: 2022-09-28 | End: 2022-12-05 | Stop reason: SDUPTHER

## 2022-09-28 NOTE — PROGRESS NOTES
This provider is located at [place], [place]. The Patient is seen remotely using Video. Patient is being seen via telehealth and confirm that they are in a secure environment for this session. Patient is located in [place], [place] at her home. The patient's condition being diagnosed/treated is appropriate for telemedicine. Provider identified as Ronny Christian as well as credentials APRN MSN PMHNP-BC.   The client/patient gave consent to be seen remotely, and when consent is given they understand that the consent allows for patient identifiable information to be sent to a third party as needed.  They may refuse to be seen remotely at any time. The electronic data is encrypted and password protected, and the patient has been advised of the potential risks to privacy not withstanding such measures.    Chief Complaint  Depression, Anxiety, and ADHD    Subjective        Isabel Landrum presents to [facility] for   History of Present Illness  Patient is seen today for follow-up visit for depression, anxiety, and ADHD.  Patient reports that overall things have been going well.  States they have had some stressors going on.  States her 's daughter was having seizures and also has MS.  States she is currently in [facility].  States she has some issues with impulsivity from the seizures.  Gives an example of she tried to drink deodorant.  States as a result of this, they are taking care of her 5-year-old daughter.  States this has been a big adjustment for she and her .  States her  is trying to decide if he should retire to help take care of the child.  States she did go to [facility] and spend a vacation with her mother, which she enjoyed.  States her mood has remained stable.  States her depression is a 2 on a 1-10 scale with 10 being the worst.  Denies any hopelessness.  Denies any suicidal or homicidal ideation.  Sleep is good.  Appetite is increased with  some overeating.  Rates her anxiety at 2 on a 1-10 scale with 10 being the worst.  States she has had a few times with some situational anxiety, but she has been able to work her way through it.  Denies any manic type symptoms.  Denies any paranoia.  Denies any auditory or visual hallucinations.  Denies any side effects to the medications.  Patient does ask for additional help with focus and attention.  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  She feels that she cannot focus and concentrate the way she wants to.  Objective   Vital Signs:   There were no vitals taken for this visit.      Mental Status Exam:   Hygiene:   good  Cooperation:  Cooperative  Eye Contact:  Good  Psychomotor Behavior:  Appropriate  Affect:  Full range  Mood: normal  Speech:  Normal  Thought Process:  Goal directed  Thought Content:  Normal  Suicidal:  None  Homicidal:  None  Hallucinations:  None  Delusion:  None  Memory:  Intact  Orientation:  Person, Place, Time and Situation  Reliability:  good  Insight:  Good  Judgement:  Good  Impulse Control:  Good  Physical/Medical Issues:  No      PHQ-2 Depression Screening  Little interest or pleasure in doing things? 0-->not at all   Feeling down, depressed, or hopeless? 0-->not at all   PHQ-2 Total Score 0       JOEL 7 anxiety screening tool that patient filled out virtually reviewed by this APRN at today's encounter.    PROMIS scale screening tool that patient filled out virtually reviewed by this APRN at today's encounter.    Previous Provider notes and available records reviewed by this APRN today.   Current Medications:   Current Outpatient Medications   Medication Sig Dispense Refill   • albuterol sulfate  (90 Base) MCG/ACT inhaler Inhale 2 puffs Every 4 (Four) Hours As Needed for Wheezing or Shortness of Air. 18 g 1   • buPROPion XL (Wellbutrin XL) 150 MG 24 hr tablet Take with Bupropion Xl 300mg to equal 450mg daily 90  tablet 0   • buPROPion XL (Wellbutrin XL) 300 MG 24 hr tablet Take with Bupropion XL 150mg to equal 450mg daily 90 tablet 0   • DULoxetine (CYMBALTA) 60 MG capsule Take 2 capsules by mouth Daily for 90 days. 180 capsule 0   • fluticasone (FLONASE) 50 MCG/ACT nasal spray 1 spray into the nostril(s) as directed by provider Daily. 18.2 mL 11   • fluticasone-salmeterol (ADVAIR DISKUS) 250-50 MCG/DOSE DISKUS Inhale 1 puff 2 (Two) Times a Day. 60 each 5   • montelukast (SINGULAIR) 10 MG tablet TAKE 1 TABLET BY MOUTH EVERY EVENING 90 tablet 3   • traZODone (DESYREL) 50 MG tablet Take 1 tablet by mouth Every Night. 90 tablet 0   • triamcinolone (KENALOG) 0.5 % ointment Apply  topically to the appropriate area as directed 2 (Two) Times a Day. Do not apply for greater than 2 weeks. 15 g 2     No current facility-administered medications for this visit.       Physical Exam   No physical exam completed today.       Assessment and Plan   Problem List Items Addressed This Visit        Mental Health    Generalized anxiety disorder (Chronic)    Relevant Medications    DULoxetine (CYMBALTA) 60 MG capsule    traZODone (DESYREL) 50 MG tablet    buPROPion XL (Wellbutrin XL) 300 MG 24 hr tablet    buPROPion XL (Wellbutrin XL) 150 MG 24 hr tablet    Moderate episode of recurrent major depressive disorder (HCC) (Chronic)    Relevant Medications    DULoxetine (CYMBALTA) 60 MG capsule    traZODone (DESYREL) 50 MG tablet    buPROPion XL (Wellbutrin XL) 300 MG 24 hr tablet    buPROPion XL (Wellbutrin XL) 150 MG 24 hr tablet    ADD (attention deficit disorder) without hyperactivity    Relevant Medications    DULoxetine (CYMBALTA) 60 MG capsule    traZODone (DESYREL) 50 MG tablet    buPROPion XL (Wellbutrin XL) 300 MG 24 hr tablet    buPROPion XL (Wellbutrin XL) 150 MG 24 hr tablet      Other Visit Diagnoses     Insomnia due to mental condition        Relevant Medications    DULoxetine (CYMBALTA) 60 MG capsule    traZODone (DESYREL) 50 MG  tablet    buPROPion XL (Wellbutrin XL) 300 MG 24 hr tablet    buPROPion XL (Wellbutrin XL) 150 MG 24 hr tablet        Discussed treatment options with patient.  Patient is currently pleased with medications for her mood.  Did discuss with patient that order to be considered for stimulant medication, she would have to undergo testing to get a definitive ADHD diagnosis.  Patient verbalized understanding.  Did discuss with patient increasing Wellbutrin XL to 450 mg daily to see if we can get a further benefit with focus and attention.  Patient agreeable to do so.  Increase Wellbutrin XL to 450 mg daily for ADHD and depression.  Continue Cymbalta 120 mg daily for depression and anxiety.  Continue trazodone 50 mg nightly as needed for sleep.  Also discussed therapy with patient how it can be beneficial for the situational stressors that she is experiencing.  Patient states she will keep that in mind, but not ready to do so just yet.  Also discussed hydroxyzine with patient for breakthrough anxiety.  Patient states she wants to hold off on that for now.  Patient request to be seen in early December.  Encouraged patient to call if she had any issues before that time.    TREATMENT PLAN/GOALS: Continue supportive psychotherapy efforts and medications as indicated. Treatment and medication options discussed during today's visit. Patient ackowledged and verbally consented to continue with current treatment plan and was educated on the importance of compliance with treatment and follow-up appointments.    DEPRESSION:  Patient screened positive for depression based on a PHQ-9 score of 0 on 9/28/2022. Follow-up recommendations include: Prescribed antidepressant medication treatment.       MEDICATION ISSUES:  We discussed risks, benefits, and side effects of the above medications and the patient was agreeable with the plan. Patient was educated on the importance of compliance with treatment and follow-up appointments.  Patient is  agreeable to call the office with any worsening of symptoms or onset of side effects. Patient is agreeable to call 911 or go to the nearest ER should he/she begin having SI/HI.      Counseled patient regarding multimodal approach with healthy nutrition, healthy sleep, regular physical activity, social activities, counseling, and medications.      Coping skills reviewed and encouraged positive framing of thoughts     Assisted patient in processing above session content; acknowledged and normalized patient’s thoughts, feelings, and concerns.  Applied  positive coping skills and behavior management in session.  Allowed patient to freely discuss issues without interruption or judgment. Provided safe, confidential environment to facilitate the development of positive therapeutic relationship and encourage open, honest communication. Assisted patient in identifying risk factors which would indicate the need for higher level of care including thoughts to harm self or others and/or self-harming behavior and encouraged patient to contact this office, call 911, or present to the nearest emergency room should any of these events occur. Discussed crisis intervention services and means to access. If patient has any concerns or needs assistance they were instructed to call the [facility] at [phone].    MEDS ORDERED DURING VISIT:  New Medications Ordered This Visit   Medications   • DULoxetine (CYMBALTA) 60 MG capsule     Sig: Take 2 capsules by mouth Daily for 90 days.     Dispense:  180 capsule     Refill:  0   • traZODone (DESYREL) 50 MG tablet     Sig: Take 1 tablet by mouth Every Night.     Dispense:  90 tablet     Refill:  0   • buPROPion XL (Wellbutrin XL) 300 MG 24 hr tablet     Sig: Take with Bupropion XL 150mg to equal 450mg daily     Dispense:  90 tablet     Refill:  0   • buPROPion XL (Wellbutrin XL) 150 MG 24 hr tablet     Sig: Take with Bupropion Xl 300mg to equal 450mg daily      Dispense:  90 tablet     Refill:  0         Follow Up   Return in about 4 weeks (around 10/26/2022) for Video visit.    Patient was given instructions and counseling regarding her condition or for health maintenance advice. Please see specific information pulled into the AVS if appropriate.         This document has been electronically signed by SHANNAN Dumont  September 28, 2022 09:43 EDT    Part of this note may be an electronic transcription/translation of spoken language to printed text using the Dragon Dictation System.

## 2022-11-08 DIAGNOSIS — J45.20 MILD INTERMITTENT ASTHMA WITHOUT COMPLICATION: ICD-10-CM

## 2022-11-08 RX ORDER — MONTELUKAST SODIUM 10 MG/1
TABLET ORAL
Qty: 90 TABLET | Refills: 3 | Status: SHIPPED | OUTPATIENT
Start: 2022-11-08

## 2022-11-22 DIAGNOSIS — F33.1 MODERATE EPISODE OF RECURRENT MAJOR DEPRESSIVE DISORDER: Chronic | ICD-10-CM

## 2022-11-22 DIAGNOSIS — F41.1 GENERALIZED ANXIETY DISORDER: Chronic | ICD-10-CM

## 2022-11-22 RX ORDER — DULOXETIN HYDROCHLORIDE 60 MG/1
120 CAPSULE, DELAYED RELEASE ORAL DAILY
Qty: 60 CAPSULE | Refills: 0 | Status: SHIPPED | OUTPATIENT
Start: 2022-11-22 | End: 2022-12-05 | Stop reason: SDUPTHER

## 2022-11-22 NOTE — TELEPHONE ENCOUNTER
Pt called while her regular provider was out of the office stating she has been without the Cymbalta for several days and not feeling well. Pt wanted to see if her provider could send in enough to get her to her next appointment. This APRN called pt back on her regular provider's behalf. Pt states she was recently at a wedding in [place] and while there the pharmacy gave her a partial fill. Apparently there was a mix up and she was not able to get the remainder of the prescription. She has been without the 120 mg of Cymbalta for four days and doesn't feel well. This APRN discussed with pt that insurance may not be willing to pay for the Cymbalta again. Located the retail pharmacy within pt's area that offers a 30 day supply of Cymbalta 120 mg PO Daily at the lowest cost using [employer] and sent it there for pt.

## 2022-12-05 ENCOUNTER — TELEMEDICINE (OUTPATIENT)
Dept: PSYCHIATRY | Facility: CLINIC | Age: 52
End: 2022-12-05

## 2022-12-05 DIAGNOSIS — F51.05 INSOMNIA DUE TO MENTAL CONDITION: ICD-10-CM

## 2022-12-05 DIAGNOSIS — F98.8 ADD (ATTENTION DEFICIT DISORDER) WITHOUT HYPERACTIVITY: ICD-10-CM

## 2022-12-05 DIAGNOSIS — F41.1 GENERALIZED ANXIETY DISORDER: Chronic | ICD-10-CM

## 2022-12-05 DIAGNOSIS — F33.1 MODERATE EPISODE OF RECURRENT MAJOR DEPRESSIVE DISORDER: Chronic | ICD-10-CM

## 2022-12-05 PROCEDURE — 99214 OFFICE O/P EST MOD 30 MIN: CPT

## 2022-12-05 RX ORDER — TRAZODONE HYDROCHLORIDE 50 MG/1
50 TABLET ORAL NIGHTLY
Qty: 30 TABLET | Refills: 0 | Status: SHIPPED | OUTPATIENT
Start: 2022-12-05 | End: 2023-01-03 | Stop reason: SDUPTHER

## 2022-12-05 RX ORDER — BUPROPION HYDROCHLORIDE 300 MG/1
300 TABLET ORAL EVERY MORNING
Qty: 30 TABLET | Refills: 0 | Status: SHIPPED | OUTPATIENT
Start: 2022-12-05 | End: 2023-01-03 | Stop reason: SDUPTHER

## 2022-12-05 RX ORDER — DULOXETIN HYDROCHLORIDE 60 MG/1
120 CAPSULE, DELAYED RELEASE ORAL DAILY
Qty: 60 CAPSULE | Refills: 0 | Status: SHIPPED | OUTPATIENT
Start: 2022-12-05 | End: 2023-01-03 | Stop reason: SDUPTHER

## 2022-12-05 NOTE — PROGRESS NOTES
This provider is located at [place], [place]. The Patient is seen remotely using Video. Patient is being seen via telehealth and confirm that they are in a secure environment for this session. Patient is located in [place], [place] at her home. The patient's condition being diagnosed/treated is appropriate for telemedicine. Provider identified as Ronny Christian as well as credentials APRN MSN PMHNP-BC.   The client/patient gave consent to be seen remotely, and when consent is given they understand that the consent allows for patient identifiable information to be sent to a third party as needed.  They may refuse to be seen remotely at any time. The electronic data is encrypted and password protected, and the patient has been advised of the potential risks to privacy not withstanding such measures.    Chief Complaint  Depression, Anxiety, and Sleeping Problem    Subjective        Isabel Landrum presents to [facility] for   History of Present Illness  Patient is seen today for follow-up visit for depression, anxiety, and insomnia.  Patient reports that she feels she is doing well.  States they have had some stressors with her stepdaughter and her medical conditions.  Patient states she has had additional seizures.  States they had her on Keppra and it gave her more of a euphoric mood that made her make some bad decisions.  States her medications are changed now and she is doing better.  Currently rates her depression anywhere from a 2 to a 5 on a 1-10 scale with 10 being the worst.  She states most of the time it is more towards the 2 range, but at its worst is at a 5.  She states she thinks her medications are working well.  Rates anxiety at 2 on a 1-10 scale with 10 being the worst.  States she does get overwhelmed at times.  States she is behind at work and that causes her additional stress.  States her sleep and appetite are good.  States she would like to explore  stimulant medication for ADHD.  Discussed again with patient that she would need to contact the number on the back of her insurance card to find the testing site close to her to get that definitive diagnosis.  Denies any manic type symptoms.  Denies any paranoia.  Denies any auditory or visual hallucinations.  Denies any suicidal or homicidal ideation.  Denies any side effects to the medications.  Objective   Vital Signs:   There were no vitals taken for this visit.      Mental Status Exam:   Hygiene:   good  Cooperation:  Cooperative  Eye Contact:  Good  Psychomotor Behavior:  Appropriate  Affect:  Full range  Mood: normal  Speech:  Normal  Thought Process:  Goal directed  Thought Content:  Normal  Suicidal:  None  Homicidal:  None  Hallucinations:  None  Delusion:  None  Memory:  Intact  Orientation:  Person, Place, Time and Situation  Reliability:  good  Insight:  Good  Judgement:  Good  Impulse Control:  Good  Physical/Medical Issues:  No      Previous Provider notes and available records reviewed by this APRN today.   Current Medications:   Current Outpatient Medications   Medication Sig Dispense Refill   • albuterol sulfate  (90 Base) MCG/ACT inhaler Inhale 2 puffs Every 4 (Four) Hours As Needed for Wheezing or Shortness of Air. 18 g 1   • buPROPion XL (Wellbutrin XL) 300 MG 24 hr tablet Take 1 tablet by mouth Every Morning. 30 tablet 0   • DULoxetine (CYMBALTA) 60 MG capsule Take 2 capsules by mouth Daily. 60 capsule 0   • fluticasone (FLONASE) 50 MCG/ACT nasal spray 1 spray into the nostril(s) as directed by provider Daily. 18.2 mL 11   • fluticasone-salmeterol (ADVAIR DISKUS) 250-50 MCG/DOSE DISKUS Inhale 1 puff 2 (Two) Times a Day. 60 each 5   • montelukast (SINGULAIR) 10 MG tablet TAKE 1 TABLET BY MOUTH EVERY EVENING 90 tablet 3   • traZODone (DESYREL) 50 MG tablet Take 1 tablet by mouth Every Night. 30 tablet 0   • triamcinolone (KENALOG) 0.5 % ointment Apply  topically to the appropriate area as  directed 2 (Two) Times a Day. Do not apply for greater than 2 weeks. 15 g 2     No current facility-administered medications for this visit.       Physical Exam   No physical exam completed today.       Assessment and Plan   Problem List Items Addressed This Visit        Mental Health    Generalized anxiety disorder (Chronic)    Relevant Medications    DULoxetine (CYMBALTA) 60 MG capsule    buPROPion XL (Wellbutrin XL) 300 MG 24 hr tablet    traZODone (DESYREL) 50 MG tablet    Moderate episode of recurrent major depressive disorder (HCC) (Chronic)    Relevant Medications    DULoxetine (CYMBALTA) 60 MG capsule    buPROPion XL (Wellbutrin XL) 300 MG 24 hr tablet    traZODone (DESYREL) 50 MG tablet    ADD (attention deficit disorder) without hyperactivity    Relevant Medications    DULoxetine (CYMBALTA) 60 MG capsule    buPROPion XL (Wellbutrin XL) 300 MG 24 hr tablet    traZODone (DESYREL) 50 MG tablet   Other Visit Diagnoses     Insomnia due to mental condition        Relevant Medications    DULoxetine (CYMBALTA) 60 MG capsule    buPROPion XL (Wellbutrin XL) 300 MG 24 hr tablet    traZODone (DESYREL) 50 MG tablet        Discussed treatment options with patient.  Patient is currently pleased with her medications.  She would like additional help in the area of anxiety.  Patient instructed to get that definitive diagnosis with ADHD testing had a psychology office to be eligible for stimulant medication.  Patient verbalized understanding and agrees.  Patient states she did not feel any additional benefit in increasing Wellbutrin XL to 450 mg, so we will decrease it back down to Wellbutrin  mg daily for depression/ADHD.  Continue Cymbalta 120 mg daily for depression and anxiety.  Continue trazodone 50 mg nightly for sleep.  We will see patient again in 4 weeks to reassess.  Encouraged patient to call if she had any issues sooner.    TREATMENT PLAN/GOALS: Continue supportive psychotherapy efforts and medications as  indicated. Treatment and medication options discussed during today's visit. Patient ackowledged and verbally consented to continue with current treatment plan and was educated on the importance of compliance with treatment and follow-up appointments.    DEPRESSION:  Patient screened positive for depression based on a PHQ-9 score of 0 on 9/28/2022. Follow-up recommendations include: Prescribed antidepressant medication treatment.       MEDICATION ISSUES:  We discussed risks, benefits, and side effects of the above medications and the patient was agreeable with the plan. Patient was educated on the importance of compliance with treatment and follow-up appointments.  Patient is agreeable to call the office with any worsening of symptoms or onset of side effects. Patient is agreeable to call 911 or go to the nearest ER should he/she begin having SI/HI.      Counseled patient regarding multimodal approach with healthy nutrition, healthy sleep, regular physical activity, social activities, counseling, and medications.      Coping skills reviewed and encouraged positive framing of thoughts     Assisted patient in processing above session content; acknowledged and normalized patient’s thoughts, feelings, and concerns.  Applied  positive coping skills and behavior management in session.  Allowed patient to freely discuss issues without interruption or judgment. Provided safe, confidential environment to facilitate the development of positive therapeutic relationship and encourage open, honest communication. Assisted patient in identifying risk factors which would indicate the need for higher level of care including thoughts to harm self or others and/or self-harming behavior and encouraged patient to contact this office, call 911, or present to the nearest emergency room should any of these events occur. Discussed crisis intervention services and means to access. If patient has any concerns or needs assistance they were  instructed to call the [facility] at [phone].    MEDS ORDERED DURING VISIT:  New Medications Ordered This Visit   Medications   • DULoxetine (CYMBALTA) 60 MG capsule     Sig: Take 2 capsules by mouth Daily.     Dispense:  60 capsule     Refill:  0   • buPROPion XL (Wellbutrin XL) 300 MG 24 hr tablet     Sig: Take 1 tablet by mouth Every Morning.     Dispense:  30 tablet     Refill:  0   • traZODone (DESYREL) 50 MG tablet     Sig: Take 1 tablet by mouth Every Night.     Dispense:  30 tablet     Refill:  0         Follow Up   Return in about 4 weeks (around 1/2/2023) for Video visit.    Patient was given instructions and counseling regarding her condition or for health maintenance advice. Please see specific information pulled into the AVS if appropriate.         This document has been electronically signed by SHANNAN Dumont  December 5, 2022 09:56 EST    Part of this note may be an electronic transcription/translation of spoken language to printed text using the Dragon Dictation System.

## 2023-01-16 DIAGNOSIS — F51.05 INSOMNIA DUE TO MENTAL CONDITION: ICD-10-CM

## 2023-01-16 RX ORDER — TRAZODONE HYDROCHLORIDE 50 MG/1
50 TABLET ORAL NIGHTLY
Qty: 30 TABLET | Refills: 0 | OUTPATIENT
Start: 2023-01-16

## 2023-01-20 ENCOUNTER — TELEPHONE (OUTPATIENT)
Dept: OBSTETRICS AND GYNECOLOGY | Facility: CLINIC | Age: 53
End: 2023-01-20

## 2023-01-20 NOTE — TELEPHONE ENCOUNTER
Caller: Isabel Landrum    Relationship to patient: Self    Best call back number: [phone]    PT SAME DAY CANCEL, PT HAS FEVER/CHILLS. IS APPRECIATIVE OF GETTING HER IN SO SOON THOUGH.

## 2023-02-07 DIAGNOSIS — F51.05 INSOMNIA DUE TO MENTAL CONDITION: ICD-10-CM

## 2023-02-08 RX ORDER — TRAZODONE HYDROCHLORIDE 50 MG/1
50 TABLET ORAL NIGHTLY
Qty: 30 TABLET | Refills: 0 | Status: SHIPPED | OUTPATIENT
Start: 2023-02-08 | End: 2023-03-06

## 2023-03-05 DIAGNOSIS — F51.05 INSOMNIA DUE TO MENTAL CONDITION: ICD-10-CM

## 2023-03-06 ENCOUNTER — TELEPHONE (OUTPATIENT)
Dept: OBSTETRICS AND GYNECOLOGY | Facility: CLINIC | Age: 53
End: 2023-03-06

## 2023-03-06 RX ORDER — TRAZODONE HYDROCHLORIDE 50 MG/1
50 TABLET ORAL NIGHTLY
Qty: 30 TABLET | Refills: 0 | Status: SHIPPED | OUTPATIENT
Start: 2023-03-06

## 2023-03-06 NOTE — TELEPHONE ENCOUNTER
Caller: Isabel Landrum    Relationship to patient: Self    Best call back number: [phone]      PT RESCHEDULED TODAY]'S APPT TO 4-@8:30AM.  THANK YOU.

## 2023-04-03 DIAGNOSIS — F98.8 ADD (ATTENTION DEFICIT DISORDER) WITHOUT HYPERACTIVITY: ICD-10-CM

## 2023-04-03 DIAGNOSIS — F33.1 MODERATE EPISODE OF RECURRENT MAJOR DEPRESSIVE DISORDER: Chronic | ICD-10-CM

## 2023-04-03 RX ORDER — BUPROPION HYDROCHLORIDE 300 MG/1
300 TABLET ORAL EVERY MORNING
Qty: 30 TABLET | Refills: 0 | Status: SHIPPED | OUTPATIENT
Start: 2023-04-03

## 2023-04-03 NOTE — TELEPHONE ENCOUNTER
Pt's regular provider is out of the office. Will send in a 30 day refill on his behalf. Pt has a follow-up appointment with regular provider on 5/1/23.

## 2023-04-09 DIAGNOSIS — F51.05 INSOMNIA DUE TO MENTAL CONDITION: ICD-10-CM

## 2023-04-10 RX ORDER — TRAZODONE HYDROCHLORIDE 50 MG/1
50 TABLET ORAL NIGHTLY
Qty: 30 TABLET | Refills: 0 | Status: SHIPPED | OUTPATIENT
Start: 2023-04-10

## 2023-04-19 DIAGNOSIS — F33.1 MODERATE EPISODE OF RECURRENT MAJOR DEPRESSIVE DISORDER: Chronic | ICD-10-CM

## 2023-04-19 DIAGNOSIS — F41.1 GENERALIZED ANXIETY DISORDER: Chronic | ICD-10-CM

## 2023-04-19 RX ORDER — DULOXETIN HYDROCHLORIDE 60 MG/1
CAPSULE, DELAYED RELEASE ORAL
Qty: 180 CAPSULE | Refills: 0 | Status: SHIPPED | OUTPATIENT
Start: 2023-04-19

## 2023-05-01 ENCOUNTER — TELEMEDICINE (OUTPATIENT)
Dept: PSYCHIATRY | Facility: CLINIC | Age: 53
End: 2023-05-01
Payer: COMMERCIAL

## 2023-05-01 DIAGNOSIS — F51.05 INSOMNIA DUE TO MENTAL CONDITION: ICD-10-CM

## 2023-05-01 DIAGNOSIS — F98.8 ADD (ATTENTION DEFICIT DISORDER) WITHOUT HYPERACTIVITY: ICD-10-CM

## 2023-05-01 DIAGNOSIS — F33.1 MODERATE EPISODE OF RECURRENT MAJOR DEPRESSIVE DISORDER: Chronic | ICD-10-CM

## 2023-05-01 DIAGNOSIS — F41.1 GENERALIZED ANXIETY DISORDER: Chronic | ICD-10-CM

## 2023-05-01 RX ORDER — BUPROPION HYDROCHLORIDE 150 MG/1
150 TABLET ORAL EVERY MORNING
Qty: 30 TABLET | Refills: 0 | Status: SHIPPED | OUTPATIENT
Start: 2023-05-01 | End: 2023-05-03 | Stop reason: DRUGHIGH

## 2023-05-01 RX ORDER — TRAZODONE HYDROCHLORIDE 50 MG/1
50 TABLET ORAL NIGHTLY
Qty: 90 TABLET | Refills: 0 | Status: SHIPPED | OUTPATIENT
Start: 2023-05-01

## 2023-05-01 RX ORDER — DULOXETIN HYDROCHLORIDE 60 MG/1
120 CAPSULE, DELAYED RELEASE ORAL DAILY
Qty: 180 CAPSULE | Refills: 0 | Status: SHIPPED | OUTPATIENT
Start: 2023-05-01

## 2023-05-02 NOTE — PROGRESS NOTES
This provider is located at [place], [place]. The Patient is seen remotely using Video. Patient is being seen via telehealth and confirm that they are in a secure environment for this session. Patient is located in [place], [place] at her home. The patient's condition being diagnosed/treated is appropriate for telemedicine. Provider identified as Ronny Christian as well as credentials APRN MSN PMHNP-BC.   The client/patient gave consent to be seen remotely, and when consent is given they understand that the consent allows for patient identifiable information to be sent to a third party as needed.  They may refuse to be seen remotely at any time. The electronic data is encrypted and password protected, and the patient has been advised of the potential risks to privacy not withstanding such measures.    Chief Complaint  Depression, Anxiety, ADHD, and Sleeping Problem    Subjective        Isabel Landrum presents to [facility] for   History of Present Illness  Patient is seen today for follow-up visit for depression, anxiety, ADHD, and insomnia.  Patient reports that she has had a lot going on over the last several months.  States she has been dealing with the sickness of her father as he did fail and hit his head.  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  States she did go get ADHD testing and she was formally diagnosed with ADHD.  She states that her depression is around a 2 to 3 on a 1-10 scale with 10 being the worst.  Patient asks about decreasing her Wellbutrin as it does give her some feeling of anxiousness.  Denies any hopelessness.  Denies any suicidal or homicidal ideation.  States that trazodone continues to be helpful for sleep.  She rates anxiety at 2-3 on a 1-10 scale with 10 being the worst.  States at times her anxiety has been higher with everything going on, but on average she feels it is a 2-3.  Denies any manic type symptoms.  Denies any  paranoia.  Denies any auditory or visual hallucinations.  Denies any side effects to the medications.  Objective   Vital Signs:   There were no vitals taken for this visit.      Mental Status Exam:   Hygiene:   good  Cooperation:  Cooperative  Eye Contact:  Good  Psychomotor Behavior:  Appropriate  Affect:  Full range  Mood: normal  Speech:  Normal  Thought Process:  Goal directed  Thought Content:  Normal  Suicidal:  None  Homicidal:  None  Hallucinations:  None  Delusion:  None  Memory:  Intact  Orientation:  Person, Place, Time and Situation  Reliability:  good  Insight:  Good  Judgement:  Good  Impulse Control:  Good  Physical/Medical Issues:  No      Previous Provider notes and available records reviewed by this APRN today.   Current Medications:   Current Outpatient Medications   Medication Sig Dispense Refill   • albuterol sulfate  (90 Base) MCG/ACT inhaler Inhale 2 puffs Every 4 (Four) Hours As Needed for Wheezing or Shortness of Air. 18 g 1   • buPROPion XL (WELLBUTRIN XL) 150 MG 24 hr tablet Take 1 tablet by mouth Every Morning. 30 tablet 0   • DULoxetine (CYMBALTA) 60 MG capsule Take 2 capsules by mouth Daily. 180 capsule 0   • fluticasone (FLONASE) 50 MCG/ACT nasal spray 1 spray into the nostril(s) as directed by provider Daily. 18.2 mL 11   • fluticasone-salmeterol (ADVAIR DISKUS) 250-50 MCG/DOSE DISKUS Inhale 1 puff 2 (Two) Times a Day. 60 each 5   • montelukast (SINGULAIR) 10 MG tablet TAKE 1 TABLET BY MOUTH EVERY EVENING 90 tablet 3   • traZODone (DESYREL) 50 MG tablet Take 1 tablet by mouth Every Night. 90 tablet 0   • triamcinolone (KENALOG) 0.5 % ointment Apply  topically to the appropriate area as directed 2 (Two) Times a Day. Do not apply for greater than 2 weeks. 15 g 2     No current facility-administered medications for this visit.       Physical Exam   No physical exam completed today.       Assessment and Plan   Problem List Items Addressed This Visit        Mental Health     Generalized anxiety disorder (Chronic)    Relevant Medications    DULoxetine (CYMBALTA) 60 MG capsule    traZODone (DESYREL) 50 MG tablet    buPROPion XL (WELLBUTRIN XL) 150 MG 24 hr tablet    Moderate episode of recurrent major depressive disorder (Chronic)    Relevant Medications    DULoxetine (CYMBALTA) 60 MG capsule    traZODone (DESYREL) 50 MG tablet    buPROPion XL (WELLBUTRIN XL) 150 MG 24 hr tablet    ADD (attention deficit disorder) without hyperactivity    Relevant Medications    DULoxetine (CYMBALTA) 60 MG capsule    traZODone (DESYREL) 50 MG tablet    buPROPion XL (WELLBUTRIN XL) 150 MG 24 hr tablet   Other Visit Diagnoses     Insomnia due to mental condition        Relevant Medications    DULoxetine (CYMBALTA) 60 MG capsule    traZODone (DESYREL) 50 MG tablet    buPROPion XL (WELLBUTRIN XL) 150 MG 24 hr tablet        Discussed treatment options with patient.  Discussed with patient that due to new [place] regulations that this APRN will not be able to prescribe any controlled substances ongoing.  Any controlled substances does have to come from an in-person provider now.  Patient verbalized understanding.  Discussed with patient that this APRN would be willing to speak with her primary care physician about any stimulant medication if her primary care physician desired to.  Continue Cymbalta 120 mg daily for depression and anxiety.  Continue trazodone 50 mg nightly for sleep.  Discussed patient's Wellbutrin and informed her that we could decrease her Wellbutrin to see how she does if she would like.  Patient wants to try a lower dose.  Decrease Wellbutrin XL to 300 mg daily for depression/ADHD.  We will see patient again in 4 weeks to reassess.  Encouraged patient to contact the office if she has any issues sooner.    TREATMENT PLAN/GOALS: Continue supportive psychotherapy efforts and medications as indicated. Treatment and medication options discussed during today's visit. Patient ackowledged and verbally  consented to continue with current treatment plan and was educated on the importance of compliance with treatment and follow-up appointments.    DEPRESSION:  Patient screened positive for depression based on a PHQ-9 score of 0 on 9/28/2022. Follow-up recommendations include: Prescribed antidepressant medication treatment.       MEDICATION ISSUES:  We discussed risks, benefits, and side effects of the above medications and the patient was agreeable with the plan. Patient was educated on the importance of compliance with treatment and follow-up appointments.  Patient is agreeable to call the office with any worsening of symptoms or onset of side effects. Patient is agreeable to call 911 or go to the nearest ER should he/she begin having SI/HI.      Counseled patient regarding multimodal approach with healthy nutrition, healthy sleep, regular physical activity, social activities, counseling, and medications.      Coping skills reviewed and encouraged positive framing of thoughts     Assisted patient in processing above session content; acknowledged and normalized patient’s thoughts, feelings, and concerns.  Applied  positive coping skills and behavior management in session.  Allowed patient to freely discuss issues without interruption or judgment. Provided safe, confidential environment to facilitate the development of positive therapeutic relationship and encourage open, honest communication. Assisted patient in identifying risk factors which would indicate the need for higher level of care including thoughts to harm self or others and/or self-harming behavior and encouraged patient to contact this office, call 911, or present to the nearest emergency room should any of these events occur. Discussed crisis intervention services and means to access. If patient has any concerns or needs assistance they were instructed to call the [facility] at [phone].    MEDS ORDERED DURING VISIT:  New  Medications Ordered This Visit   Medications   • DULoxetine (CYMBALTA) 60 MG capsule     Sig: Take 2 capsules by mouth Daily.     Dispense:  180 capsule     Refill:  0   • traZODone (DESYREL) 50 MG tablet     Sig: Take 1 tablet by mouth Every Night.     Dispense:  90 tablet     Refill:  0   • buPROPion XL (WELLBUTRIN XL) 150 MG 24 hr tablet     Sig: Take 1 tablet by mouth Every Morning.     Dispense:  30 tablet     Refill:  0         Follow Up   Return in about 4 weeks (around 5/29/2023) for Video visit.    Patient was given instructions and counseling regarding her condition or for health maintenance advice. Please see specific information pulled into the AVS if appropriate.         This document has been electronically signed by SHANNAN Dumont  May 2, 2023 17:50 EDT    Part of this note may be an electronic transcription/translation of spoken language to printed text using the Dragon Dictation System.

## 2023-05-03 ENCOUNTER — OFFICE VISIT (OUTPATIENT)
Dept: FAMILY MEDICINE CLINIC | Facility: CLINIC | Age: 53
End: 2023-05-03
Payer: COMMERCIAL

## 2023-05-03 VITALS
TEMPERATURE: 97.3 F | OXYGEN SATURATION: 98 % | RESPIRATION RATE: 15 BRPM | BODY MASS INDEX: 38.11 KG/M2 | HEART RATE: 91 BPM | DIASTOLIC BLOOD PRESSURE: 80 MMHG | WEIGHT: 222 LBS | SYSTOLIC BLOOD PRESSURE: 110 MMHG

## 2023-05-03 DIAGNOSIS — F90.0 ADHD (ATTENTION DEFICIT HYPERACTIVITY DISORDER), INATTENTIVE TYPE: Primary | ICD-10-CM

## 2023-05-03 DIAGNOSIS — Z23 IMMUNIZATION DUE: ICD-10-CM

## 2023-05-03 RX ORDER — METHYLPHENIDATE HYDROCHLORIDE 18 MG/1
18 TABLET ORAL EVERY MORNING
Qty: 30 TABLET | Refills: 0 | Status: SHIPPED | OUTPATIENT
Start: 2023-05-03

## 2023-05-03 RX ORDER — BUPROPION HYDROCHLORIDE 300 MG/1
300 TABLET ORAL DAILY
COMMUNITY

## 2023-05-03 NOTE — PROGRESS NOTES
Subjective   Isabel Landrum is a 52 y.o. female.     History of Present Illness she is doing ok.  She has tried variety of antidep meds.  She is wanting to get tested for adhd.  Referral to mental health aprn.  She was started on wellbutrin for and added trazodone for sleep.  She is interested in something to help focus. She had some testing for ADHD and that was positive for ADHD.  She has uploaded those results into the system I am not able to see those right now.  She reports right now she is just trying to get 10-year and do a good job at her work but she does not feel like she is as productive as she should be.  She reports as a child she was told she had ADHD.  She was not treated at that time.  She has never been treated for ADHD she feels like the Wellbutrin and the Cymbalta have made a big difference in her overall depression    The following portions of the patient's history were reviewed and updated as appropriate: allergies, current medications, past family history, past medical history, past social history, past surgical history and problem list.    Review of Systems   Constitutional: Negative.    HENT: Negative.    Eyes: Negative.    Respiratory: Negative.    Cardiovascular: Negative.    Gastrointestinal: Negative.    Endocrine: Negative.    Genitourinary: Negative.    Musculoskeletal: Negative.    Skin: Negative.    Allergic/Immunologic: Negative.    Neurological: Negative.    Hematological: Negative.    Psychiatric/Behavioral: Positive for agitation, decreased concentration and dysphoric mood. Negative for self-injury, sleep disturbance and suicidal ideas. The patient is nervous/anxious.    All other systems reviewed and are negative.      Objective     Vitals:    05/03/23 0846   BP: 110/80   Pulse: 91   Resp: 15   Temp: 97.3 °F (36.3 °C)   SpO2: 98%   Weight: 101 kg (222 lb)       Physical Exam  Vitals and nursing note reviewed.   Constitutional:       Appearance: She is well-developed.    HENT:      Head: Normocephalic and atraumatic.   Eyes:      General:         Right eye: No discharge.         Left eye: No discharge.      Pupils: Pupils are equal, round, and reactive to light.   Cardiovascular:      Rate and Rhythm: Normal rate and regular rhythm.      Heart sounds: Normal heart sounds.   Pulmonary:      Effort: Pulmonary effort is normal.      Breath sounds: Normal breath sounds.   Abdominal:      General: Bowel sounds are normal.      Palpations: Abdomen is soft. There is no mass.      Tenderness: There is no abdominal tenderness.   Musculoskeletal:         General: Normal range of motion.      Right shoulder: No swelling.      Cervical back: Normal range of motion and neck supple.   Skin:     General: Skin is warm and dry.      Nails: There is no clubbing.   Neurological:      Mental Status: She is alert and oriented to person, place, and time.      Deep Tendon Reflexes: Reflexes are normal and symmetric.   Psychiatric:         Behavior: Behavior normal.         Thought Content: Thought content normal.         Judgment: Judgment normal.         Assessment & Plan     Problem List Items Addressed This Visit    None  Visit Diagnoses     ADHD (attention deficit hyperactivity disorder), inattentive type    -  Primary    Relevant Medications    buPROPion XL (WELLBUTRIN XL) 300 MG 24 hr tablet    methylphenidate (Concerta) 18 MG CR tablet    Other Relevant Orders    Ambulatory Referral to Psychiatry    Immunization due        Relevant Orders    Pneumococcal Conjugate Vaccine 20-Valent (PCV20) (Completed)          Answers for HPI/ROS submitted by the patient on 4/27/2023  Please describe your symptoms.: (1) I would like to discuss my metal health medicines., (2) I have had two episodes of nausea, chest pain, cold sweat, and indigestion (both in January 2023).  Have you had these symptoms before?: No  How long have you been having these symptoms?: Greater than 2 weeks  Please list any medications you  are currently taking for this condition.: None  Please describe any probable cause for these symptoms. : I'm concerned that they are cardiac symptoms.  What is the primary reason for your visit?: Other  We will try her on Concerta to see if that makes a huge difference in her productivity and her ability to focus at work.  We will have her continue the Wellbutrin and the 6 Cymbalta.  I have placed a referral to psychiatry we talked about the risk benefits and side effects of the Concerta including sleeplessness TMJ grinding of the teeth worsening anxiety.  She will get her pneumonia shot today.

## 2023-05-05 ENCOUNTER — PRIOR AUTHORIZATION (OUTPATIENT)
Dept: FAMILY MEDICINE CLINIC | Facility: CLINIC | Age: 53
End: 2023-05-05
Payer: COMMERCIAL

## 2023-05-19 ENCOUNTER — TELEPHONE (OUTPATIENT)
Dept: FAMILY MEDICINE CLINIC | Facility: CLINIC | Age: 53
End: 2023-05-19

## 2023-05-19 NOTE — TELEPHONE ENCOUNTER
Caller: Isabel Landrum    Relationship: Self    Best call back number: [phone]    What is the best time to reach you: ANYTIME     Who are you requesting to speak with (clinical staff, provider,  specific staff member): CLINICAL STAFF     What was the call regarding:  PATIENT IS CALLING IN REGARDS TPO THE 18 MG OF CONCERTA THAT SHE WAS PRESCRIBED. SHE SAYS THAT THE PHARMACY IS UNABLE TO GET IN THE GENERIC VERSION OF THE MEDICATION, AND HAS BEEN UNABLE TOO FOR A WHILE,  AND HER INSURANCE WILL NOT COVER THE NAME BRAND. SHE ASKED THE PHARMACY FOR MEDICATIONS THAT WOULD BE SIMILAR. THEY TOLD HER FOCALINE XR AND GENERIC ADDERALL AND VYVANSE. SHE IS ASKING HER PROVIDER TO SEE WHICH ONE SHE THINK WOULD BE BEST AND GET THIS CALLED IN TO THE [facility]     Do you require a callback: YES

## 2023-05-22 DIAGNOSIS — F90.0 ADHD (ATTENTION DEFICIT HYPERACTIVITY DISORDER), INATTENTIVE TYPE: Primary | ICD-10-CM

## 2023-05-22 RX ORDER — METHYLPHENIDATE HYDROCHLORIDE EXTENDED RELEASE 10 MG/1
10 TABLET ORAL EVERY MORNING
Qty: 30 TABLET | Refills: 0 | Status: SHIPPED | OUTPATIENT
Start: 2023-05-22

## 2023-06-01 ENCOUNTER — TELEMEDICINE (OUTPATIENT)
Dept: PSYCHIATRY | Facility: CLINIC | Age: 53
End: 2023-06-01

## 2023-06-01 DIAGNOSIS — F41.1 GENERALIZED ANXIETY DISORDER: Chronic | ICD-10-CM

## 2023-06-01 DIAGNOSIS — F51.05 INSOMNIA DUE TO MENTAL CONDITION: ICD-10-CM

## 2023-06-01 DIAGNOSIS — F98.8 ADD (ATTENTION DEFICIT DISORDER) WITHOUT HYPERACTIVITY: Primary | ICD-10-CM

## 2023-06-01 DIAGNOSIS — F33.1 MODERATE EPISODE OF RECURRENT MAJOR DEPRESSIVE DISORDER: Chronic | ICD-10-CM

## 2023-06-01 RX ORDER — BUPROPION HYDROCHLORIDE 150 MG/1
150 TABLET ORAL DAILY
Qty: 90 TABLET | Refills: 0 | Status: SHIPPED | OUTPATIENT
Start: 2023-06-01

## 2023-06-01 RX ORDER — TRAZODONE HYDROCHLORIDE 50 MG/1
50 TABLET ORAL NIGHTLY PRN
Qty: 90 TABLET | Refills: 0 | Status: SHIPPED | OUTPATIENT
Start: 2023-06-01

## 2023-06-01 RX ORDER — DULOXETIN HYDROCHLORIDE 60 MG/1
120 CAPSULE, DELAYED RELEASE ORAL DAILY
Qty: 180 CAPSULE | Refills: 0 | Status: SHIPPED | OUTPATIENT
Start: 2023-06-01

## 2023-06-01 NOTE — PROGRESS NOTES
This provider is located at [place], [place]. The Patient is seen remotely using Video. Patient is being seen via telehealth and confirm that they are in a secure environment for this session. Patient is located in [place], [place] at her home. The patient's condition being diagnosed/treated is appropriate for telemedicine. Provider identified as Ronny Christian as well as credentials APRN MSN PMHNP-BC.   The client/patient gave consent to be seen remotely, and when consent is given they understand that the consent allows for patient identifiable information to be sent to a third party as needed.  They may refuse to be seen remotely at any time. The electronic data is encrypted and password protected, and the patient has been advised of the potential risks to privacy not withstanding such measures.    Chief Complaint  Depression, Anxiety, ADHD, and Sleeping Problem    Subjective        Isabel Landrum presents to [facility] for   History of Present Illness  Patient is seen today for follow-up visit for depression, anxiety, ADHD, and insomnia.  Patient states she did get started on Metadate CD by her primary care physician.  She states her primary care physician is not really comfortable writing the stimulants, but she gave her a start until she can get in somewhere.  Patient was informed that the EVON reversed course and this APRN can now write controlled substances again.  Patient states she feels she is in a good spot from a mood standpoint.  She rates depression a 5 on a 1-10 scale with 10 being the worst.  She states it is not an everyday occurrence, but there is just some days that she feels down.  She states this is nothing new.  States her depression is manageable at this time.  Denies any hopelessness.  Denies any suicidal or homicidal ideation.  States her sleep is good with the help of trazodone.  She does ask if trazodone has to be taken every night or it can be  used as needed.  Appetite is good.  Rates anxiety at 2-3 on a 1-10 scale with 10 being the worst.  States she has been very busy.  States her daughter graduated and they had her graduation party yesterday.  She states she is currently pleased with her medications.  She denies any side effects to the medications.  Denies any manic type symptoms.  Denies any paranoia.  Denies any auditory or visual hallucinations.  She states the Metadate has been helpful with her focus and concentration.  States she feels she is more productive on the medication.  States she can tell a difference since starting it.  She states she has not had any side effects to the Metadate.  Denies any palpitations or heart issues.  Objective   Vital Signs:   There were no vitals taken for this visit.      Mental Status Exam:   Hygiene:   good  Cooperation:  Cooperative  Eye Contact:  Good  Psychomotor Behavior:  Appropriate  Affect:  Full range  Mood: normal  Speech:  Normal  Thought Process:  Goal directed  Thought Content:  Normal  Suicidal:  None  Homicidal:  None  Hallucinations:  None  Delusion:  None  Memory:  Intact  Orientation:  Person, Place, Time and Situation  Reliability:  good  Insight:  Good  Judgement:  Good  Impulse Control:  Good  Physical/Medical Issues:  No      [facility] request number [id number] reviewed by this APRN at today's encounter.    Previous Provider notes and available records reviewed by this APRN today.   Current Medications:   Current Outpatient Medications   Medication Sig Dispense Refill   • albuterol sulfate  (90 Base) MCG/ACT inhaler Inhale 2 puffs Every 4 (Four) Hours As Needed for Wheezing or Shortness of Air. 18 g 1   • buPROPion XL (WELLBUTRIN XL) 150 MG 24 hr tablet Take 1 tablet by mouth Daily. 90 tablet 0   • DULoxetine (CYMBALTA) 60 MG capsule Take 2 capsules by mouth Daily. 180 capsule 0   • methylphenidate ER (METADATE ER) 10 MG CR tablet Take 1 tablet by mouth Every Morning. 30 tablet 0   •  "montelukast (SINGULAIR) 10 MG tablet TAKE 1 TABLET BY MOUTH EVERY EVENING 90 tablet 3   • traZODone (DESYREL) 50 MG tablet Take 1 tablet by mouth At Night As Needed for Sleep. 90 tablet 0     No current facility-administered medications for this visit.       Physical Exam   No physical exam completed today       Assessment and Plan   Problem List Items Addressed This Visit        Mental Health    Generalized anxiety disorder (Chronic)    Relevant Medications    traZODone (DESYREL) 50 MG tablet    buPROPion XL (WELLBUTRIN XL) 150 MG 24 hr tablet    DULoxetine (CYMBALTA) 60 MG capsule    Moderate episode of recurrent major depressive disorder (Chronic)    Relevant Medications    traZODone (DESYREL) 50 MG tablet    buPROPion XL (WELLBUTRIN XL) 150 MG 24 hr tablet    DULoxetine (CYMBALTA) 60 MG capsule    ADD (attention deficit disorder) without hyperactivity - Primary    Relevant Medications    traZODone (DESYREL) 50 MG tablet    buPROPion XL (WELLBUTRIN XL) 150 MG 24 hr tablet    DULoxetine (CYMBALTA) 60 MG capsule    Other Relevant Orders    Urine Drug Screen - Urine, Clean Catch   Other Visit Diagnoses     Insomnia due to mental condition        Relevant Medications    traZODone (DESYREL) 50 MG tablet    buPROPion XL (WELLBUTRIN XL) 150 MG 24 hr tablet    DULoxetine (CYMBALTA) 60 MG capsule        Controlled Substance Medication Contract    Controlled substance medications (i.e. benzodiazepines, opioids, amphetamines) are very useful, but have a high potential for misuse and are, therefore, closely controlled by local, state, and federal government(s). As a patient of [facility], you agree and understand the followin. I am responsible for the controlled substance medications prescribed to me. If my prescription is misplaced, stolen, or if \"I run out early,\" I understand this medication will not be replaced regardless of the circumstances.  2. Refills of controlled substance " "medications: (a) Will be made only during regular office hours Monday-Thursday once a month and during a scheduled virtual appointment. Refills will not be made at night, weekends, or on holidays. (b) Will not be made if \"I lost my prescriptions,\" \"ran out early,\" or \"misplaced my medication\". I am solely responsible for taking the medication as prescribed and for keeping track of the remaining.   3. I agree to comply with urine drug testing and pill counts at the provider's discretion, thereby, documenting the proper use of any medications. If alcohol abuse is suspected, a breathalyzer or blood alcohol level may be ordered. Unannounced urine or serum toxicology specimens may be requested and my cooperation is required.  4. I understand that if I violate any of the above conditions, my prescriptions for controlled medications may be terminated. If the violation involves obtaining these medications from another individual, or the concomitant use of non-prescription illicit (illegal) drugs, I may also be reported to other providers, pharmacies, medical facilities and the appropriate authorities.   5. I further understand that if I violate this controlled substance contract due to non-compliance of medical directions, such as the failure in taking medications as prescribed, utilizing other illicit drugs, or abuse of controlled medications, the prescription for controlled medications may be terminated.   6. I agree to keep my scheduled appointments and conduct myself in a courteous manner.  7. I agree not to sell, share, or give any medication to another person. I understand that such mishandling of my medication is a serious violation of this agreement and would result in my treatment being terminated without any recourse for appeal.   8. I agree not to take my medication from any physicians, nurse practitioners, pharmacies or other sources without telling my prescriber.  9. I agree to take my medication as my " prescriber has instructed and not to alter the way I take my medication without first consulting my prescriber.  10. I agree to abstain from problematic alcohol usage, opioids, marijuana, cocaine, and other addictive substances.   11. If I am legally involved related to legal or illegal drugs, including alcohol, refill of controlled substances will not be given until a re-evaluation of my chemical dependency treatment plan has been completed. Refills are at the discretion of the prescriber.   12. I agree to fill all of my controlled medications at an in-state ([place]) pharmacy.   13. I understand that [facility] utilizes the [place] All Schedule Prescription Electronic Reporting (WILDER) system and will monitor my prescription history via this source.  14. Benzodiazepines are drugs prescribed to treat conditions like anxiety, insomnia, and seizures. Examples of these drugs include: alprazolam, clonazepam, diazepam, and lorazepam. The FDA has applied a Black Box Warning (one of the strictest warnings) that the use of opioids and benzodiazepines together have serious risks to include unusual dizziness or lightheadedness, extreme sleepiness, slowed or difficult breathing, coma and death. There is an added risk if alcohol is also ingested. It is the policy of [facility] to NOT prescribe benzodiazepines to patients who also use opioids. If a patient already presents already prescribed both, the prescriber may direct the patient to their previous provider who prescribed it or taper the benzodiazepine as part of the treatment plan. These patients must be monitored at appropriate intervals and so visits may be more frequent.     This APRN has discussed and reviewed this information with the patient and/or guardian. The patient and/or guardian verbally agreed (no signatures are obtained during today's visit as they are a telehealth patient and is unable to print  and sign this document, therefore, verbal agreement has been obtained).     Discussed treatment options with patient.  Discussed with patient that the EVON did change his mind about virtual providers prescribing controlled substances, so this APRN can treat her for ADHD after all.  Patient is relieved to hear this and states she would like to continue with this APRN.  Patient got her Metadate CD filled on 5/22/2023, so it would not be due until late June.  Patient will call at this time for a refill.  Patient is going to be traveling out of the country for a lot of June to [facility].  Advised patient that controlled substances must be filled in the [facility].  Patient verbalized understanding.  Discussed with patient that she can take the trazodone as needed.  Continue trazodone 50 mg nightly as needed for sleep.  Continue Wellbutrin  mg daily for depression.  Continue Cymbalta 120 mg daily for depression and anxiety.  We will see patient again in a month to reassess.  Encouraged patient to contact the office if she has any issues sooner.  Also discussed with patient that she will have to provide a clean urine drug screen in order for this APRN to prescribe her ADHD medication.  Patient verbalized understanding.    TREATMENT PLAN/GOALS: Continue supportive psychotherapy efforts and medications as indicated. Treatment and medication options discussed during today's visit. Patient ackowledged and verbally consented to continue with current treatment plan and was educated on the importance of compliance with treatment and follow-up appointments.    DEPRESSION:  Patient screened positive for depression based on a PHQ-9 score of 2 on 5/2/2023. Follow-up recommendations include: Prescribed antidepressant medication treatment.       MEDICATION ISSUES:  We discussed risks, benefits, and side effects of the above medications and the patient was agreeable with the plan. Patient was educated on the importance  of compliance with treatment and follow-up appointments.  Patient is agreeable to call the office with any worsening of symptoms or onset of side effects. Patient is agreeable to call 911 or go to the nearest ER should he/she begin having SI/HI.      Counseled patient regarding multimodal approach with healthy nutrition, healthy sleep, regular physical activity, social activities, counseling, and medications.      Coping skills reviewed and encouraged positive framing of thoughts     Assisted patient in processing above session content; acknowledged and normalized patient’s thoughts, feelings, and concerns.  Applied  positive coping skills and behavior management in session.  Allowed patient to freely discuss issues without interruption or judgment. Provided safe, confidential environment to facilitate the development of positive therapeutic relationship and encourage open, honest communication. Assisted patient in identifying risk factors which would indicate the need for higher level of care including thoughts to harm self or others and/or self-harming behavior and encouraged patient to contact this office, call 911, or present to the nearest emergency room should any of these events occur. Discussed crisis intervention services and means to access. If patient has any concerns or needs assistance they were instructed to call the [facility] at [phone].    MEDS ORDERED DURING VISIT:  New Medications Ordered This Visit   Medications   • traZODone (DESYREL) 50 MG tablet     Sig: Take 1 tablet by mouth At Night As Needed for Sleep.     Dispense:  90 tablet     Refill:  0   • buPROPion XL (WELLBUTRIN XL) 150 MG 24 hr tablet     Sig: Take 1 tablet by mouth Daily.     Dispense:  90 tablet     Refill:  0   • DULoxetine (CYMBALTA) 60 MG capsule     Sig: Take 2 capsules by mouth Daily.     Dispense:  180 capsule     Refill:  0         Follow Up   Return in about 4 weeks (around 6/29/2023) for  Video visit.    Patient was given instructions and counseling regarding her condition or for health maintenance advice. Please see specific information pulled into the AVS if appropriate.         This document has been electronically signed by SHANNAN Dumont  June 1, 2023 09:02 EDT    Part of this note may be an electronic transcription/translation of spoken language to printed text using the Dragon Dictation System.

## 2023-06-12 ENCOUNTER — OFFICE VISIT (OUTPATIENT)
Dept: OBSTETRICS AND GYNECOLOGY | Facility: CLINIC | Age: 53
End: 2023-06-12
Payer: COMMERCIAL

## 2023-06-12 VITALS — BODY MASS INDEX: 37.76 KG/M2 | SYSTOLIC BLOOD PRESSURE: 130 MMHG | DIASTOLIC BLOOD PRESSURE: 88 MMHG | WEIGHT: 220 LBS

## 2023-06-12 DIAGNOSIS — Z01.419 ENCOUNTER FOR WELL WOMAN EXAM WITH ROUTINE GYNECOLOGICAL EXAM: ICD-10-CM

## 2023-06-12 DIAGNOSIS — Z12.31 SCREENING MAMMOGRAM FOR BREAST CANCER: Primary | ICD-10-CM

## 2023-06-12 PROBLEM — E78.5 HYPERLIPIDEMIA: Status: ACTIVE | Noted: 2023-06-12

## 2023-06-12 PROBLEM — Z87.19 HISTORY OF ANAL DYSPLASIA: Status: ACTIVE | Noted: 2023-06-12

## 2023-06-12 PROBLEM — B00.9 HSV-2 (HERPES SIMPLEX VIRUS 2) INFECTION: Status: ACTIVE | Noted: 2023-06-12

## 2023-06-12 PROBLEM — A63.0 ANAL CONDYLOMA: Status: ACTIVE | Noted: 2023-06-12

## 2023-06-12 PROBLEM — K64.4 SKIN TAGS, ANUS OR RECTUM: Status: ACTIVE | Noted: 2023-06-12

## 2023-06-12 PROCEDURE — 99396 PREV VISIT EST AGE 40-64: CPT | Performed by: NURSE PRACTITIONER

## 2023-06-12 NOTE — PROGRESS NOTES
Subjective   Chief Complaint   Patient presents with    Annual Exam     Feels like she is perimenopausal     Isabel Landrum is a 52 y.o. year old  presenting to be seen for her annual exam. She continues to have regular periods every 30-32. She feels she has been having more hot flashes.  Her last mammogram was in .  She is up to date on colonoscopy with need for rescreen in 3 years (2024).  She had a HPV negative normal cytology Pap in , she would like repeated today instead of extended screening interval.    SEXUAL Hx:  She is currently sexually active.  In the past year there there has been NO new sexual partners.    Condoms are never used.  She would not like to be screened for STD's at today's exam.  Current birth control method: vasectomy.  She is happy with her current method of contraception and does not want to discuss alternative methods of contraception.  MENSTRUAL Hx:  Patient's last menstrual period was 2023 (within days).  In the past 6 months her cycles have been regular, predictable and occur monthly.  Her menstrual flow is typically normal.   Each month on average there are roughly 1 day(s) of very heavy flow.    Intermenstrual bleeding is absent.    Post-coital bleeding is absent.  Dysmenorrhea: is not affecting her activities of daily living  PMS: is not affecting her activities of daily living  Her cycles are not a source of concern for her that she wishes to discuss today.  HEALTH Hx:  She exercises regularly: no (but is planning to start exercising more).  She wears her seat belt: yes.  She has concerns about domestic violence: no.  OTHER THINGS SHE WANTS TO DISCUSS TODAY:  Nothing else    The following portions of the patient's history were reviewed and updated as appropriate:problem list, current medications, allergies, past family history, past medical history, past social history, and past surgical history.    Social History    Tobacco Use      Smoking status:  Never      Smokeless tobacco: Never      Tobacco comments: smoked infrequently in mid-90s    Review of Systems  Constitutional POS: weight gain after age 40 she has had difficulty with weight loss and continues to gain     NEG: night sweats   Genitourinary POS: nothing reported    NEG: dysuria or hematuria      Gastointestinal POS: nothing reported    NEG: bloating, change in bowel habits, melena, or reflux symptoms   Integument POS: nothing reported    NEG: moles that are changing in size, shape, color or rashes   Breast POS: nothing reported    NEG: persistent breast lump, skin dimpling, or nipple discharge        Objective   /88 (BP Location: Right arm, Patient Position: Sitting, Cuff Size: Large Adult)   Wt 99.8 kg (220 lb)   LMP 05/17/2023 (Within Days)   BMI 37.76 kg/m²     General:  well developed; well nourished  no acute distress  obese - Body mass index is 37.76 kg/m².   Skin:  No suspicious lesions seen   Thyroid: normal to inspection and palpation   Breasts:  Examined in supine position  Symmetric without masses or skin dimpling  Nipples normal without inversion, lesions or discharge  There are no palpable axillary nodes   Abdomen: soft, non-tender; no masses  no umbilical or inguinal hernias are present  no hepato-splenomegaly   Pelvis: Clinical staff was present for exam  :  urethral meatus normal;  Vaginal:  normal pink mucosa without prolapse or lesions.  Cervix:  normal appearance.  Uterus:  normal size, shape and consistency.  Adnexa:  normal bimanual exam of the adnexa.  Rectal:  digital rectal exam not performed; anus visually normal appearing.  Exam limited by patient body habitus        Assessment   Well woman with routine gynecological exam  Overdue for screening mammogram  Repeat colonoscopy due February 2024     Plan     Pap was done today per patient request even though it has been less then 3 years since her last Pap smear.  If she does not receive the results of the Pap within  2 weeks  time, she was instructed to call to find out the results.  I explained to Isabel that the recommendations for Pap smear interval in a low risk patient's has lengthened to 3 years time.  I encouraged her to be seen yearly for a full physical exam including breast and pelvic exam even during the off years when PAP's will not be performed.  She was encouraged to get yearly mammograms.  She should report any palpable breast lump(s) or skin changes regardless of mammographic findings.  I explained to Isabel that notification regarding her mammogram results will come from the center performing the study.  Our office will not be routinely calling with mammogram results.  It is her responsibility to make sure that the results from the mammogram are communicated to her by the breast center.  If she has any questions about the results, she is welcome to call our office anytime.  Reviewed perimenopausal status with patient.  If worsening symptoms or heavy bothersome bleeding return to care prior to annual  The importance of keeping all planned follow-up and taking all medications as prescribed was emphasized.  Today I discussed with Isabel the total recommended calcium intake for a premenopausal female is 1000 mg.  Ideally this should be from dietary sources.  I reviewed calcium content in various foods including milk, fortified orange juice and yogurt.  If she cannot get sufficient calcium through dietary means, it is recommended to supplement with either a multivitamin or calcium to reach her daily goal.  I also reviewed the difference in the bioavailability of calcium carbonate and calcium citrate containing supplements and the importance of taking calcium carbonate containing products with food.  Finally, vitamin D's role in calcium absorption was reviewed and a total daily vitamin D intake of 800 units was recommended.  Follow up for annual exam 1 year    No orders of the defined types were placed in this  encounter.         This note was electronically signed.    Linda Esqueda, APRN  June 12, 2023

## 2023-06-13 LAB — REF LAB TEST METHOD: NORMAL

## 2023-08-02 ENCOUNTER — TELEMEDICINE (OUTPATIENT)
Dept: PSYCHIATRY | Facility: CLINIC | Age: 53
End: 2023-08-02
Payer: COMMERCIAL

## 2023-08-02 DIAGNOSIS — F41.1 GENERALIZED ANXIETY DISORDER: Chronic | ICD-10-CM

## 2023-08-02 DIAGNOSIS — F90.0 ADHD (ATTENTION DEFICIT HYPERACTIVITY DISORDER), INATTENTIVE TYPE: ICD-10-CM

## 2023-08-02 DIAGNOSIS — F51.05 INSOMNIA DUE TO MENTAL CONDITION: ICD-10-CM

## 2023-08-02 DIAGNOSIS — F33.1 MODERATE EPISODE OF RECURRENT MAJOR DEPRESSIVE DISORDER: Chronic | ICD-10-CM

## 2023-08-02 RX ORDER — DULOXETIN HYDROCHLORIDE 60 MG/1
120 CAPSULE, DELAYED RELEASE ORAL DAILY
Qty: 180 CAPSULE | Refills: 0 | Status: SHIPPED | OUTPATIENT
Start: 2023-08-02

## 2023-08-02 RX ORDER — METHYLPHENIDATE HYDROCHLORIDE EXTENDED RELEASE 10 MG/1
10 TABLET ORAL EVERY MORNING
Qty: 30 TABLET | Refills: 0 | Status: SHIPPED | OUTPATIENT
Start: 2023-08-02

## 2023-08-02 RX ORDER — TRAZODONE HYDROCHLORIDE 50 MG/1
50 TABLET ORAL NIGHTLY PRN
Qty: 90 TABLET | Refills: 0 | Status: SHIPPED | OUTPATIENT
Start: 2023-08-02

## 2023-08-02 RX ORDER — BUPROPION HYDROCHLORIDE 300 MG/1
300 TABLET ORAL DAILY
Qty: 90 TABLET | Refills: 0 | Status: SHIPPED | OUTPATIENT
Start: 2023-08-02

## 2023-08-14 ENCOUNTER — OFFICE VISIT (OUTPATIENT)
Dept: FAMILY MEDICINE CLINIC | Facility: CLINIC | Age: 53
End: 2023-08-14
Payer: COMMERCIAL

## 2023-08-14 VITALS
HEART RATE: 90 BPM | SYSTOLIC BLOOD PRESSURE: 120 MMHG | OXYGEN SATURATION: 98 % | TEMPERATURE: 97.8 F | WEIGHT: 220 LBS | RESPIRATION RATE: 15 BRPM | DIASTOLIC BLOOD PRESSURE: 90 MMHG | BODY MASS INDEX: 37.76 KG/M2

## 2023-08-14 DIAGNOSIS — F41.1 GENERALIZED ANXIETY DISORDER: Chronic | ICD-10-CM

## 2023-08-14 DIAGNOSIS — F98.8 ADD (ATTENTION DEFICIT DISORDER) WITHOUT HYPERACTIVITY: Primary | ICD-10-CM

## 2023-08-14 PROCEDURE — 99212 OFFICE O/P EST SF 10 MIN: CPT | Performed by: FAMILY MEDICINE

## 2023-08-14 NOTE — PROGRESS NOTES
Subjective   Isabel Landrum is a 52 y.o. female.     History of Present Illness she is here for 3-month follow-up appointment from her initial appointment with me on 5/3/2023 where she was wanting to see someone for ADHD.  We replaced a referral to psychiatry and she has been seeing her psychiatrist and is on Cymbalta and Wellbutrin and extended release methylphenidate 10 mg.  She is doing great tolerating that medication well has no new complaints no new issues.    The following portions of the patient's history were reviewed and updated as appropriate: allergies, current medications, past family history, past medical history, past social history, past surgical history, and problem list.    Review of Systems   Constitutional: Negative.    HENT: Negative.     Eyes: Negative.    Respiratory: Negative.     Cardiovascular: Negative.    Gastrointestinal: Negative.    Endocrine: Negative.    Genitourinary: Negative.    Musculoskeletal: Negative.    Skin: Negative.    Allergic/Immunologic: Negative.    Neurological: Negative.    Hematological: Negative.    Psychiatric/Behavioral: Negative.     All other systems reviewed and are negative.    Objective     Vitals:    08/14/23 1054   BP: 120/90   Pulse: 90   Resp: 15   Temp: 97.8 øF (36.6 øC)   SpO2: 98%   Weight: 99.8 kg (220 lb)       Physical Exam  Constitutional:       General: She is not in acute distress.     Appearance: Normal appearance.   Eyes:      Extraocular Movements: Extraocular movements intact.   Pulmonary:      Effort: Pulmonary effort is normal.   Neurological:      Mental Status: She is alert.   Psychiatric:         Mood and Affect: Mood normal.         Behavior: Behavior normal.         Thought Content: Thought content normal.         Judgment: Judgment normal.       Assessment & Plan     Problem List Items Addressed This Visit          Mental Health    Generalized anxiety disorder (Chronic)    ADD (attention deficit disorder) without hyperactivity  - Primary       Answers submitted by the patient for this visit:  Other (Submitted on 8/14/2023)  Please describe your symptoms.: Follow-up visit for ADHD medication.  Have you had these symptoms before?: Yes  How long have you been having these symptoms?: Greater than 2 weeks  Please list any medications you are currently taking for this condition.: methylphenidate ER 10mg  Please describe any probable cause for these symptoms. : diagnosed  Primary Reason for Visit (Submitted on 8/14/2023)  What is the primary reason for your visit?: Other

## 2023-08-30 ENCOUNTER — TELEMEDICINE (OUTPATIENT)
Dept: PSYCHIATRY | Facility: CLINIC | Age: 53
End: 2023-08-30
Payer: COMMERCIAL

## 2023-08-30 DIAGNOSIS — F51.05 INSOMNIA DUE TO MENTAL CONDITION: ICD-10-CM

## 2023-08-30 DIAGNOSIS — F33.1 MODERATE EPISODE OF RECURRENT MAJOR DEPRESSIVE DISORDER: Chronic | ICD-10-CM

## 2023-08-30 DIAGNOSIS — F90.0 ADHD (ATTENTION DEFICIT HYPERACTIVITY DISORDER), INATTENTIVE TYPE: ICD-10-CM

## 2023-08-30 DIAGNOSIS — F41.1 GENERALIZED ANXIETY DISORDER: Chronic | ICD-10-CM

## 2023-08-30 RX ORDER — TRAZODONE HYDROCHLORIDE 50 MG/1
50 TABLET ORAL NIGHTLY PRN
Qty: 90 TABLET | Refills: 0 | Status: SHIPPED | OUTPATIENT
Start: 2023-08-30

## 2023-08-30 RX ORDER — DULOXETIN HYDROCHLORIDE 60 MG/1
120 CAPSULE, DELAYED RELEASE ORAL DAILY
Qty: 180 CAPSULE | Refills: 0 | Status: SHIPPED | OUTPATIENT
Start: 2023-08-30

## 2023-08-30 RX ORDER — BUPROPION HYDROCHLORIDE 300 MG/1
300 TABLET ORAL DAILY
Qty: 90 TABLET | Refills: 0 | Status: SHIPPED | OUTPATIENT
Start: 2023-08-30

## 2023-08-30 RX ORDER — METHYLPHENIDATE HYDROCHLORIDE EXTENDED RELEASE 10 MG/1
10 TABLET ORAL EVERY MORNING
Qty: 30 TABLET | Refills: 0 | Status: SHIPPED | OUTPATIENT
Start: 2023-08-30

## 2023-08-30 NOTE — PROGRESS NOTES
This provider is located at [place], [place]. The Patient is seen remotely using Video. Patient is being seen via telehealth and confirm that they are in a secure environment for this session. Patient is located in [place], [place] at her home. The patient's condition being diagnosed/treated is appropriate for telemedicine. Provider identified as Ronny Christian as well as credentials SHANNAN MSN PMHNP-BC.   The client/patient gave consent to be seen remotely, and when consent is given they understand that the consent allows for patient identifiable information to be sent to a third party as needed.  They may refuse to be seen remotely at any time. The electronic data is encrypted and password protected, and the patient has been advised of the potential risks to privacy not withstanding such measures.    Chief Complaint  Depression, Anxiety, ADHD, and Sleeping Problem    Subjective        Isabel Landrum presents to [facility] for   History of Present Illness  Seen today for follow-up visit for depression, anxiety, ADHD, and insomnia.  Patient reports she is doing well.  She states the increase in Wellbutrin has helped with her depression.  Now rates her depression at 2 on a 1-10 scale with 10 being the worst.  Denies any hopelessness.  Denies any suicidal or homicidal ideation.  She states her sleep been disrupted, but equates that to her sleep apnea.  Appetite is good.  Rates anxiety at 3 on a 1-10 scale with 10 being the worst.  She feels her symptoms are under control.  States she anticipates her anxiety to get a little higher as school starts back next week.  States she is going to be very busy, and possibly have 60 to 70-hour weeks during this semester.  She states also that her  has retired to take care of his daughter who has medical problems.  States there is some concern about household finances with him retiring.  Denies any manic type symptoms.  Denies any  paranoia.  Denies any auditory or visual hallucinations.  Denies any side effects to the medications.  States she has been taking holidays from taking the Metadate on the weekends.  States she feels it continues to work well at this time.  Objective   Vital Signs:   There were no vitals taken for this visit.      Mental Status Exam:   Hygiene:   good  Cooperation:  Cooperative  Eye Contact:  Good  Psychomotor Behavior:  Appropriate  Affect:  Full range  Mood: normal  Speech:  Normal  Thought Process:  Goal directed  Thought Content:  Normal  Suicidal:  None  Homicidal:  None  Hallucinations:  None  Delusion:  None  Memory:  Intact  Orientation:  Person, Place, Time, and Situation  Reliability:  good  Insight:  Good  Judgement:  Good  Impulse Control:  Good  Physical/Medical Issues:  No      [facility] request number  [id number] reviewed by this APRN at today's encounter.    Previous Provider notes and available records reviewed by this APRN today.   Current Medications:   Current Outpatient Medications   Medication Sig Dispense Refill    albuterol sulfate  (90 Base) MCG/ACT inhaler Inhale 2 puffs Every 4 (Four) Hours As Needed for Wheezing or Shortness of Air. 18 g 1    buPROPion XL (WELLBUTRIN XL) 300 MG 24 hr tablet Take 1 tablet by mouth Daily. 90 tablet 0    DULoxetine (CYMBALTA) 60 MG capsule Take 2 capsules by mouth Daily. 180 capsule 0    methylphenidate ER (METADATE ER) 10 MG CR tablet Take 1 tablet by mouth Every Morning. 30 tablet 0    montelukast (SINGULAIR) 10 MG tablet TAKE 1 TABLET BY MOUTH EVERY EVENING 90 tablet 3    traZODone (DESYREL) 50 MG tablet Take 1 tablet by mouth At Night As Needed for Sleep. 90 tablet 0     No current facility-administered medications for this visit.       Physical Exam   Physical exam completed today.       Assessment and Plan   Problem List Items Addressed This Visit          Mental Health    Generalized anxiety disorder (Chronic)    Relevant Medications    DULoxetine  (CYMBALTA) 60 MG capsule    buPROPion XL (WELLBUTRIN XL) 300 MG 24 hr tablet    traZODone (DESYREL) 50 MG tablet    methylphenidate ER (METADATE ER) 10 MG CR tablet    Moderate episode of recurrent major depressive disorder (Chronic)    Relevant Medications    DULoxetine (CYMBALTA) 60 MG capsule    buPROPion XL (WELLBUTRIN XL) 300 MG 24 hr tablet    traZODone (DESYREL) 50 MG tablet    methylphenidate ER (METADATE ER) 10 MG CR tablet    ADHD (attention deficit hyperactivity disorder), inattentive type    Relevant Medications    DULoxetine (CYMBALTA) 60 MG capsule    buPROPion XL (WELLBUTRIN XL) 300 MG 24 hr tablet    traZODone (DESYREL) 50 MG tablet    methylphenidate ER (METADATE ER) 10 MG CR tablet     Other Visit Diagnoses       Insomnia due to mental condition        Relevant Medications    DULoxetine (CYMBALTA) 60 MG capsule    buPROPion XL (WELLBUTRIN XL) 300 MG 24 hr tablet    traZODone (DESYREL) 50 MG tablet    methylphenidate ER (METADATE ER) 10 MG CR tablet          Treatment options with patient.  Continue Cymbalta 120 mg daily for depression and anxiety.  Continue Wellbutrin  mg daily for depression.  Continue trazodone 50 mg nightly as needed for sleep.  Continue Metadate ER 10 mg daily for ADHD.  We will see patient again in 4 weeks to reassess.  Encouraged patient to contact the office if she has any issues sooner.    TREATMENT PLAN/GOALS: Continue supportive psychotherapy efforts and medications as indicated. Treatment and medication options discussed during today's visit. Patient ackowledged and verbally consented to continue with current treatment plan and was educated on the importance of compliance with treatment and follow-up appointments.    DEPRESSION:  Patient screened positive for depression based on a PHQ-9 score of 2 on 5/2/2023. Follow-up recommendations include: Prescribed antidepressant medication treatment.       MEDICATION ISSUES:  We discussed risks, benefits, and side effects  of the above medications and the patient was agreeable with the plan. Patient was educated on the importance of compliance with treatment and follow-up appointments.  Patient is agreeable to call the office with any worsening of symptoms or onset of side effects. Patient is agreeable to call 911 or go to the nearest ER should he/she begin having SI/HI.      Counseled patient regarding multimodal approach with healthy nutrition, healthy sleep, regular physical activity, social activities, counseling, and medications.      Coping skills reviewed and encouraged positive framing of thoughts     Assisted patient in processing above session content; acknowledged and normalized patient's thoughts, feelings, and concerns.  Applied  positive coping skills and behavior management in session.  Allowed patient to freely discuss issues without interruption or judgment. Provided safe, confidential environment to facilitate the development of positive therapeutic relationship and encourage open, honest communication. Assisted patient in identifying risk factors which would indicate the need for higher level of care including thoughts to harm self or others and/or self-harming behavior and encouraged patient to contact this office, call 911, or present to the nearest emergency room should any of these events occur. Discussed crisis intervention services and means to access. If patient has any concerns or needs assistance they were instructed to call the [facility] at [phone].    MEDS ORDERED DURING VISIT:  New Medications Ordered This Visit   Medications    DULoxetine (CYMBALTA) 60 MG capsule     Sig: Take 2 capsules by mouth Daily.     Dispense:  180 capsule     Refill:  0    buPROPion XL (WELLBUTRIN XL) 300 MG 24 hr tablet     Sig: Take 1 tablet by mouth Daily.     Dispense:  90 tablet     Refill:  0    traZODone (DESYREL) 50 MG tablet     Sig: Take 1 tablet by mouth At Night As Needed for Sleep.      Dispense:  90 tablet     Refill:  0    methylphenidate ER (METADATE ER) 10 MG CR tablet     Sig: Take 1 tablet by mouth Every Morning.     Dispense:  30 tablet     Refill:  0         Follow Up   Return in about 4 weeks (around 9/27/2023) for Video visit.    Patient was given instructions and counseling regarding her condition or for health maintenance advice. Please see specific information pulled into the AVS if appropriate.         This document has been electronically signed by SHANNAN Dumont  August 30, 2023 09:32 EDT    Part of this note may be an electronic transcription/translation of spoken language to printed text using the Dragon Dictation System.

## 2023-09-26 ENCOUNTER — TELEMEDICINE (OUTPATIENT)
Dept: PSYCHIATRY | Facility: CLINIC | Age: 53
End: 2023-09-26
Payer: COMMERCIAL

## 2023-09-26 DIAGNOSIS — F51.05 INSOMNIA DUE TO MENTAL CONDITION: ICD-10-CM

## 2023-09-26 DIAGNOSIS — F33.1 MODERATE EPISODE OF RECURRENT MAJOR DEPRESSIVE DISORDER: Chronic | ICD-10-CM

## 2023-09-26 DIAGNOSIS — F41.1 GENERALIZED ANXIETY DISORDER: Chronic | ICD-10-CM

## 2023-09-26 DIAGNOSIS — F90.0 ADHD (ATTENTION DEFICIT HYPERACTIVITY DISORDER), INATTENTIVE TYPE: ICD-10-CM

## 2023-09-26 RX ORDER — METHYLPHENIDATE HYDROCHLORIDE EXTENDED RELEASE 10 MG/1
10 TABLET ORAL EVERY MORNING
Qty: 30 TABLET | Refills: 0 | Status: SHIPPED | OUTPATIENT
Start: 2023-09-26

## 2023-09-26 RX ORDER — BUPROPION HYDROCHLORIDE 300 MG/1
300 TABLET ORAL DAILY
Qty: 90 TABLET | Refills: 0 | Status: SHIPPED | OUTPATIENT
Start: 2023-09-26

## 2023-09-26 RX ORDER — TRAZODONE HYDROCHLORIDE 50 MG/1
50 TABLET ORAL NIGHTLY PRN
Qty: 90 TABLET | Refills: 0 | Status: SHIPPED | OUTPATIENT
Start: 2023-09-26

## 2023-09-26 RX ORDER — DULOXETIN HYDROCHLORIDE 60 MG/1
120 CAPSULE, DELAYED RELEASE ORAL DAILY
Qty: 180 CAPSULE | Refills: 0 | Status: SHIPPED | OUTPATIENT
Start: 2023-09-26

## 2023-09-26 NOTE — PROGRESS NOTES
"This provider is located at [place], [place]. The Patient is seen remotely using Video. Patient is being seen via telehealth and confirm that they are in a secure environment for this session. Patient is located in [place], [place] at her home. The patient's condition being diagnosed/treated is appropriate for telemedicine. Provider identified as Ronny Christian as well as credentials APRN MSN PMHNP-BC.   The client/patient gave consent to be seen remotely, and when consent is given they understand that the consent allows for patient identifiable information to be sent to a third party as needed.  They may refuse to be seen remotely at any time. The electronic data is encrypted and password protected, and the patient has been advised of the potential risks to privacy not withstanding such measures.    Chief Complaint  Depression, Anxiety, ADHD, and Sleeping Problem    Subjective        Isabel Landrum presents to [facility] for   History of Present Illness  Patient is seen today for follow-up visit for depression, anxiety, ADHD, and insomnia.  Patient reports that she is doing pretty good.  She states this semester is started.  She states she is very busy, but she is \"keeping her head above water\".  She states that she has noticed that the methylphenidate has helped her to stay on task and get her daily tasks done.  States that it has been so busy at the first part of the semester that she really has not had a chance to gauge to make a determination if she is satisfied with the current dosage.  She states she would like to go another month and still evaluate that.  She states that she has had some more days where it is hard to get out of bed on her days off from school.  States she is unable to tell if it is depression or just tired.  She would like to monitor that as well.  She rates depression a 4 on a 1-10 scale with 10 being the worst.  Denies any hopelessness.  Denies " any suicidal or homicidal ideation.  She states for the most part her sleep is good with the help of trazodone.  She states she has not taken it for a few nights and noticed a difference.  States she has also been having trouble with her sleep apnea mask of sleeping off during the night and thinks that plays into it as well.  Appetite is good.  Rates anxiety a 4-5 on a 1-10 scale with 10 being the worst.  She states that she feels it is just work stress that has a little higher at this time.  States she is currently pleased with her medications and would like to continue the current doses.  She denies any side effects to the medications.  Denies any manic type symptoms.  Denies any paranoia.  Denies any auditory or visual hallucinations.  She is hosting a party later this month for one of her best friends that is moving to [facility].  States hosting parties gives her energy and she is looking forward to that.  Objective   Vital Signs:   There were no vitals taken for this visit.      Mental Status Exam:   Hygiene:   good  Cooperation:  Cooperative  Eye Contact:  Good  Psychomotor Behavior:  Appropriate  Affect:  Full range  Mood: normal  Speech:  Normal  Thought Process:  Goal directed  Thought Content:  Normal  Suicidal:  None  Homicidal:  None  Hallucinations:  None  Delusion:  None  Memory:  Intact  Orientation:  Person, Place, Time, and Situation  Reliability:  good  Insight:  Good  Judgement:  Good  Impulse Control:  Good  Physical/Medical Issues:  No      [facility] request number [id number] reviewed by this APRN at today's encounter.    Previous Provider notes and available records reviewed by this APRN today.   Current Medications:   Current Outpatient Medications   Medication Sig Dispense Refill    albuterol sulfate  (90 Base) MCG/ACT inhaler Inhale 2 puffs Every 4 (Four) Hours As Needed for Wheezing or Shortness of Air. 18 g 1    buPROPion XL (WELLBUTRIN XL) 300 MG 24 hr tablet Take 1 tablet by mouth  Daily. 90 tablet 0    DULoxetine (CYMBALTA) 60 MG capsule Take 2 capsules by mouth Daily. 180 capsule 0    methylphenidate ER (METADATE ER) 10 MG CR tablet Take 1 tablet by mouth Every Morning. 30 tablet 0    montelukast (SINGULAIR) 10 MG tablet TAKE 1 TABLET BY MOUTH EVERY EVENING 90 tablet 3    traZODone (DESYREL) 50 MG tablet Take 1 tablet by mouth At Night As Needed for Sleep. 90 tablet 0     No current facility-administered medications for this visit.       Physical Exam   No physical exam completed today.       Assessment and Plan   Problem List Items Addressed This Visit          Mental Health    Generalized anxiety disorder (Chronic)    Relevant Medications    traZODone (DESYREL) 50 MG tablet    methylphenidate ER (METADATE ER) 10 MG CR tablet    DULoxetine (CYMBALTA) 60 MG capsule    buPROPion XL (WELLBUTRIN XL) 300 MG 24 hr tablet    Moderate episode of recurrent major depressive disorder (Chronic)    Relevant Medications    traZODone (DESYREL) 50 MG tablet    methylphenidate ER (METADATE ER) 10 MG CR tablet    DULoxetine (CYMBALTA) 60 MG capsule    buPROPion XL (WELLBUTRIN XL) 300 MG 24 hr tablet    ADHD (attention deficit hyperactivity disorder), inattentive type    Relevant Medications    traZODone (DESYREL) 50 MG tablet    methylphenidate ER (METADATE ER) 10 MG CR tablet    DULoxetine (CYMBALTA) 60 MG capsule    buPROPion XL (WELLBUTRIN XL) 300 MG 24 hr tablet     Other Visit Diagnoses       Insomnia due to mental condition        Relevant Medications    traZODone (DESYREL) 50 MG tablet    methylphenidate ER (METADATE ER) 10 MG CR tablet    DULoxetine (CYMBALTA) 60 MG capsule    buPROPion XL (WELLBUTRIN XL) 300 MG 24 hr tablet          Mame treatment options with patient.  Patient is currently pleased with her medications.  Continue methylphenidate ER 10 mg daily for ADHD.  Continue trazodone 50 mg nightly as needed for sleep.  Continue Cymbalta 120 mg daily for depression and anxiety.  Continue  Wellbutrin  mg daily for depression.  We will see patient again in 4 weeks to reassess.  Encouraged patient to contact the office if she has any issues sooner.    TREATMENT PLAN/GOALS: Continue supportive psychotherapy efforts and medications as indicated. Treatment and medication options discussed during today's visit. Patient ackowledged and verbally consented to continue with current treatment plan and was educated on the importance of compliance with treatment and follow-up appointments.    DEPRESSION:  Patient screened positive for depression based on a PHQ-9 score of 2 on 5/2/2023. Follow-up recommendations include: Prescribed antidepressant medication treatment.       MEDICATION ISSUES:  We discussed risks, benefits, and side effects of the above medications and the patient was agreeable with the plan. Patient was educated on the importance of compliance with treatment and follow-up appointments.  Patient is agreeable to call the office with any worsening of symptoms or onset of side effects. Patient is agreeable to call 911 or go to the nearest ER should he/she begin having SI/HI.      Counseled patient regarding multimodal approach with healthy nutrition, healthy sleep, regular physical activity, social activities, counseling, and medications.      Coping skills reviewed and encouraged positive framing of thoughts     Assisted patient in processing above session content; acknowledged and normalized patient’s thoughts, feelings, and concerns.  Applied  positive coping skills and behavior management in session.  Allowed patient to freely discuss issues without interruption or judgment. Provided safe, confidential environment to facilitate the development of positive therapeutic relationship and encourage open, honest communication. Assisted patient in identifying risk factors which would indicate the need for higher level of care including thoughts to harm self or others and/or self-harming behavior and  encouraged patient to contact this office, call 911, or present to the nearest emergency room should any of these events occur. Discussed crisis intervention services and means to access. If patient has any concerns or needs assistance they were instructed to call the [facility] at [phone].    MEDS ORDERED DURING VISIT:  New Medications Ordered This Visit   Medications    traZODone (DESYREL) 50 MG tablet     Sig: Take 1 tablet by mouth At Night As Needed for Sleep.     Dispense:  90 tablet     Refill:  0    methylphenidate ER (METADATE ER) 10 MG CR tablet     Sig: Take 1 tablet by mouth Every Morning.     Dispense:  30 tablet     Refill:  0    DULoxetine (CYMBALTA) 60 MG capsule     Sig: Take 2 capsules by mouth Daily.     Dispense:  180 capsule     Refill:  0    buPROPion XL (WELLBUTRIN XL) 300 MG 24 hr tablet     Sig: Take 1 tablet by mouth Daily.     Dispense:  90 tablet     Refill:  0         Follow Up   Return in about 4 weeks (around 10/24/2023) for Video visit.    Patient was given instructions and counseling regarding her condition or for health maintenance advice. Please see specific information pulled into the AVS if appropriate.         This document has been electronically signed by SHANNAN Dumont  September 26, 2023 08:25 EDT    Part of this note may be an electronic transcription/translation of spoken language to printed text using the Dragon Dictation System.

## 2023-10-24 ENCOUNTER — TELEMEDICINE (OUTPATIENT)
Dept: PSYCHIATRY | Facility: CLINIC | Age: 53
End: 2023-10-24
Payer: COMMERCIAL

## 2023-10-24 DIAGNOSIS — F41.1 GENERALIZED ANXIETY DISORDER: Chronic | ICD-10-CM

## 2023-10-24 DIAGNOSIS — F51.05 INSOMNIA DUE TO MENTAL CONDITION: ICD-10-CM

## 2023-10-24 DIAGNOSIS — F33.1 MODERATE EPISODE OF RECURRENT MAJOR DEPRESSIVE DISORDER: Chronic | ICD-10-CM

## 2023-10-24 DIAGNOSIS — F90.0 ADHD (ATTENTION DEFICIT HYPERACTIVITY DISORDER), INATTENTIVE TYPE: ICD-10-CM

## 2023-10-24 PROCEDURE — 99214 OFFICE O/P EST MOD 30 MIN: CPT

## 2023-10-24 RX ORDER — TRAZODONE HYDROCHLORIDE 50 MG/1
50 TABLET ORAL NIGHTLY PRN
Qty: 90 TABLET | Refills: 0 | Status: SHIPPED | OUTPATIENT
Start: 2023-10-24

## 2023-10-24 RX ORDER — METHYLPHENIDATE HYDROCHLORIDE EXTENDED RELEASE 10 MG/1
10 TABLET ORAL EVERY MORNING
Qty: 30 TABLET | Refills: 0 | Status: SHIPPED | OUTPATIENT
Start: 2023-10-24

## 2023-10-24 RX ORDER — BUPROPION HYDROCHLORIDE 300 MG/1
300 TABLET ORAL DAILY
Qty: 90 TABLET | Refills: 0 | Status: SHIPPED | OUTPATIENT
Start: 2023-10-24

## 2023-10-24 RX ORDER — DULOXETIN HYDROCHLORIDE 60 MG/1
120 CAPSULE, DELAYED RELEASE ORAL DAILY
Qty: 180 CAPSULE | Refills: 0 | Status: SHIPPED | OUTPATIENT
Start: 2023-10-24

## 2023-10-24 NOTE — PROGRESS NOTES
This provider is located at [place], [place]. The Patient is seen remotely using Video. Patient is being seen via telehealth and confirm that they are in a secure environment for this session. Patient is located in [place], [place] at her home. The patient's condition being diagnosed/treated is appropriate for telemedicine. Provider identified as Ronny Christian as well as credentials APRN MSN PMHNP-BC.   The client/patient gave consent to be seen remotely, and when consent is given they understand that the consent allows for patient identifiable information to be sent to a third party as needed.  They may refuse to be seen remotely at any time. The electronic data is encrypted and password protected, and the patient has been advised of the potential risks to privacy not withstanding such measures.    Chief Complaint  Depression, Anxiety, ADHD, and Sleeping Problem    Subjective        Isabel Landrum presents to [facility] for   History of Present Illness  Patient is seen today for follow-up visit for depression, anxiety, ADHD, and insomnia.  Patient reports that she is doing well.  She reports that her depression is a 1-2 on a 1-10 scale with 10 being the worst.  States she only had a few days where it was hard to get out of bed.  Denies any hopelessness.  Denies any suicidal or homicidal ideation.  States her sleep and appetite are good.  Rates anxiety 2-3 on a 1-10 scale with 10 being the worst.  She states things are going well.  States her stimulant medication is working well in controlling her focus and attention symptoms.  States she has noticed that she has developed sort of tic with clearing her throat and swallowing at times.  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  States she has started taking some magnesium to see if it helps with that.  States she would like to just watch  it for now and not make any changes to her medications at this time.  Denies any other side effects to the medications.  Denies any manic type symptoms.  Denies any paranoia.  Denies any auditory or visual hallucinations.  Objective   Vital Signs:   There were no vitals taken for this visit.      Mental Status Exam:   Hygiene:   good  Cooperation:  Cooperative  Eye Contact:  Good  Psychomotor Behavior:  Appropriate  Affect:  Full range  Mood: normal  Speech:  Normal  Thought Process:  Goal directed  Thought Content:  Normal  Suicidal:  None  Homicidal:  None  Hallucinations:  None  Delusion:  None  Memory:  Intact  Orientation:  Person, Place, Time, and Situation  Reliability:  good  Insight:  Good  Judgement:  Good  Impulse Control:  Good  Physical/Medical Issues:  No      [facility] request number [id number]  reviewed by this APRN at today's encounter.    Previous Provider notes and available records reviewed by this APRN today.   Current Medications:   Current Outpatient Medications   Medication Sig Dispense Refill    albuterol sulfate  (90 Base) MCG/ACT inhaler Inhale 2 puffs Every 4 (Four) Hours As Needed for Wheezing or Shortness of Air. 18 g 1    buPROPion XL (WELLBUTRIN XL) 300 MG 24 hr tablet Take 1 tablet by mouth Daily. 90 tablet 0    DULoxetine (CYMBALTA) 60 MG capsule Take 2 capsules by mouth Daily. 180 capsule 0    methylphenidate ER (METADATE ER) 10 MG CR tablet Take 1 tablet by mouth Every Morning. 30 tablet 0    montelukast (SINGULAIR) 10 MG tablet TAKE 1 TABLET BY MOUTH EVERY EVENING 90 tablet 3    traZODone (DESYREL) 50 MG tablet Take 1 tablet by mouth At Night As Needed for Sleep. 90 tablet 0     No current facility-administered medications for this visit.       Physical Exam   No physical exam completed today.       Assessment and Plan   Problem List Items Addressed This Visit          Mental Health    Generalized anxiety disorder (Chronic)    Relevant Medications    traZODone (DESYREL) 50 MG  tablet    DULoxetine (CYMBALTA) 60 MG capsule    buPROPion XL (WELLBUTRIN XL) 300 MG 24 hr tablet    methylphenidate ER (METADATE ER) 10 MG CR tablet    Moderate episode of recurrent major depressive disorder (Chronic)    Relevant Medications    traZODone (DESYREL) 50 MG tablet    DULoxetine (CYMBALTA) 60 MG capsule    buPROPion XL (WELLBUTRIN XL) 300 MG 24 hr tablet    methylphenidate ER (METADATE ER) 10 MG CR tablet    ADHD (attention deficit hyperactivity disorder), inattentive type    Relevant Medications    traZODone (DESYREL) 50 MG tablet    DULoxetine (CYMBALTA) 60 MG capsule    buPROPion XL (WELLBUTRIN XL) 300 MG 24 hr tablet    methylphenidate ER (METADATE ER) 10 MG CR tablet     Other Visit Diagnoses       Insomnia due to mental condition        Relevant Medications    traZODone (DESYREL) 50 MG tablet    DULoxetine (CYMBALTA) 60 MG capsule    buPROPion XL (WELLBUTRIN XL) 300 MG 24 hr tablet    methylphenidate ER (METADATE ER) 10 MG CR tablet          Discussed treatment options with patient.  Discussed with patient that if the tic persists that we could either discuss adding guanfacine or change to a different stimulant.  Patient verbalized understanding and states she would like to wait another month before making any changes.  Continue Metadate ER 10 mg daily for ADHD.  Continue Cymbalta 120 mg daily for depression and anxiety.  Continue Wellbutrin 300 mg daily for depression and ADHD.  Continue trazodone 50 mg nightly as needed for sleep.  We will see patient again in 4 weeks to reassess.  Encouraged patient to contact the office if she has any issues sooner.    TREATMENT PLAN/GOALS: Continue supportive psychotherapy efforts and medications as indicated. Treatment and medication options discussed during today's visit. Patient ackowledged and verbally consented to continue with current treatment plan and was educated on the importance of compliance with treatment and follow-up  appointments.    DEPRESSION:  Patient screened positive for depression based on a PHQ-9 score of 2 on 5/2/2023. Follow-up recommendations include: Prescribed antidepressant medication treatment.       MEDICATION ISSUES:  We discussed risks, benefits, and side effects of the above medications and the patient was agreeable with the plan. Patient was educated on the importance of compliance with treatment and follow-up appointments.  Patient is agreeable to call the office with any worsening of symptoms or onset of side effects. Patient is agreeable to call 911 or go to the nearest ER should he/she begin having SI/HI.      Counseled patient regarding multimodal approach with healthy nutrition, healthy sleep, regular physical activity, social activities, counseling, and medications.      Coping skills reviewed and encouraged positive framing of thoughts     Assisted patient in processing above session content; acknowledged and normalized patient’s thoughts, feelings, and concerns.  Applied  positive coping skills and behavior management in session.  Allowed patient to freely discuss issues without interruption or judgment. Provided safe, confidential environment to facilitate the development of positive therapeutic relationship and encourage open, honest communication. Assisted patient in identifying risk factors which would indicate the need for higher level of care including thoughts to harm self or others and/or self-harming behavior and encouraged patient to contact this office, call 911, or present to the nearest emergency room should any of these events occur. Discussed crisis intervention services and means to access. If patient has any concerns or needs assistance they were instructed to call the [facility] at [phone].    MEDS ORDERED DURING VISIT:  New Medications Ordered This Visit   Medications    traZODone (DESYREL) 50 MG tablet     Sig: Take 1 tablet by mouth At Night As  Needed for Sleep.     Dispense:  90 tablet     Refill:  0    DULoxetine (CYMBALTA) 60 MG capsule     Sig: Take 2 capsules by mouth Daily.     Dispense:  180 capsule     Refill:  0    buPROPion XL (WELLBUTRIN XL) 300 MG 24 hr tablet     Sig: Take 1 tablet by mouth Daily.     Dispense:  90 tablet     Refill:  0    methylphenidate ER (METADATE ER) 10 MG CR tablet     Sig: Take 1 tablet by mouth Every Morning.     Dispense:  30 tablet     Refill:  0     Fill only when due         Follow Up   Return in about 4 weeks (around 11/21/2023) for Video visit.    Patient was given instructions and counseling regarding her condition or for health maintenance advice. Please see specific information pulled into the AVS if appropriate.         This document has been electronically signed by SHANNAN Dumont  October 24, 2023 08:27 EDT    Part of this note may be an electronic transcription/translation of spoken language to printed text using the Dragon Dictation System.

## 2023-11-22 ENCOUNTER — TELEMEDICINE (OUTPATIENT)
Dept: PSYCHIATRY | Facility: CLINIC | Age: 53
End: 2023-11-22
Payer: COMMERCIAL

## 2023-11-22 DIAGNOSIS — F33.1 MODERATE EPISODE OF RECURRENT MAJOR DEPRESSIVE DISORDER: Chronic | ICD-10-CM

## 2023-11-22 DIAGNOSIS — F51.05 INSOMNIA DUE TO MENTAL CONDITION: ICD-10-CM

## 2023-11-22 DIAGNOSIS — F90.0 ADHD (ATTENTION DEFICIT HYPERACTIVITY DISORDER), INATTENTIVE TYPE: ICD-10-CM

## 2023-11-22 DIAGNOSIS — F41.1 GENERALIZED ANXIETY DISORDER: Chronic | ICD-10-CM

## 2023-11-22 RX ORDER — METHYLPHENIDATE HYDROCHLORIDE EXTENDED RELEASE 10 MG/1
10 TABLET ORAL EVERY MORNING
Qty: 30 TABLET | Refills: 0 | Status: SHIPPED | OUTPATIENT
Start: 2023-11-22

## 2023-11-22 RX ORDER — TRAZODONE HYDROCHLORIDE 50 MG/1
50 TABLET ORAL NIGHTLY PRN
Qty: 90 TABLET | Refills: 0 | Status: SHIPPED | OUTPATIENT
Start: 2023-11-22

## 2023-11-22 RX ORDER — BUPROPION HYDROCHLORIDE 300 MG/1
300 TABLET ORAL DAILY
Qty: 90 TABLET | Refills: 0 | Status: SHIPPED | OUTPATIENT
Start: 2023-11-22

## 2023-11-22 RX ORDER — DULOXETIN HYDROCHLORIDE 60 MG/1
120 CAPSULE, DELAYED RELEASE ORAL DAILY
Qty: 180 CAPSULE | Refills: 0 | Status: SHIPPED | OUTPATIENT
Start: 2023-11-22

## 2023-11-22 NOTE — PROGRESS NOTES
This provider is located at [place], [place]. The Patient is seen remotely using Video. Patient is being seen via telehealth and confirm that they are in a secure environment for this session. Patient is located in [place], [place] at her home. The patient's condition being diagnosed/treated is appropriate for telemedicine. Provider identified as Ronny Christian as well as credentials APRN MSN PMHNP-BC.   The client/patient gave consent to be seen remotely, and when consent is given they understand that the consent allows for patient identifiable information to be sent to a third party as needed.  They may refuse to be seen remotely at any time. The electronic data is encrypted and password protected, and the patient has been advised of the potential risks to privacy not withstanding such measures.    Chief Complaint  Depression, Anxiety, ADHD, and Sleeping Problem    Subjective        Isabel Landrum presents to [facility] for   History of Present Illness  Patient is seen today for follow-up visit for depression, anxiety, ADHD, and insomnia.  Patient reports that she has had some new stressors this month.  She states that her 's daughter that has MS has been drinking in excess.  States she is not taking care of herself.  States that her 's granddaughter who is 6 years old has been staying with him.  States it is possible that they will take over raising her.  States this is a big decision for them.  She states this semester is going well.  States she stays busy, but it is going good.  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  She states outside of that she feels her medications are working well.  She is pleased with her focus and attention symptoms with the Metadate.  She rates both her  depression and anxiety 1-2 on a 1-10 scale with 10 being the worst.  She denies any hopelessness.  Denies any suicidal or homicidal ideation.  States that the tic that she mention last month has not got any worse.  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  States it just happens if she is sitting along by herself, but she is not concerned with that at this time.  Denies any manic type symptoms.  Denies any paranoia.  Denies any auditory or visual hallucinations.  Denies any side effects to the medications.  Objective   Vital Signs:   There were no vitals taken for this visit.      Mental Status Exam:   Hygiene:   good  Cooperation:  Cooperative  Eye Contact:  Good  Psychomotor Behavior:  Appropriate  Affect:  Full range  Mood: normal  Speech:  Normal  Thought Process:  Goal directed  Thought Content:  Normal  Suicidal:  None  Homicidal:  None  Hallucinations:  None  Delusion:  None  Memory:  Intact  Orientation:  Person, Place, Time, and Situation  Reliability:  good  Insight:  Good  Judgement:  Good  Impulse Control:  Good  Physical/Medical Issues:  No      PHQ-9 Depression Screening  Little interest or pleasure in doing things? (P) 1-->several days   Feeling down, depressed, or hopeless? (P) 1-->several days   Trouble falling or staying asleep, or sleeping too much? (P) 2-->more than half the days   Feeling tired or having little energy? (P) 2-->more than half the days   Poor appetite or overeating? (P) 1-->several days   Feeling bad about yourself - or that you are a failure or have let yourself or your family down? (P) 1-->several days   Trouble concentrating on things, such as reading the newspaper or watching television? (P) 1-->several days   Moving or speaking so slowly that other people could have noticed? Or the opposite - being so fidgety or restless that you have been moving around a lot more than usual? (P) 1-->several days   Thoughts that you would be better off  dead, or of hurting yourself in some way? (P) 0-->not at all   PHQ-9 Total Score (P) 10   If you checked off any problems, how difficult have these problems made it for you to do your work, take care of things at home, or get along with other people? (P) somewhat difficult        JOEL 7 anxiety screening tool that patient filled out virtually reviewed by this APRN at today's encounter.    [facility] request number [id number] reviewed by this APRN at today's encounter.    Previous Provider notes and available records reviewed by this APRN today.   Current Medications:   Current Outpatient Medications   Medication Sig Dispense Refill    albuterol sulfate  (90 Base) MCG/ACT inhaler Inhale 2 puffs Every 4 (Four) Hours As Needed for Wheezing or Shortness of Air. 18 g 1    buPROPion XL (WELLBUTRIN XL) 300 MG 24 hr tablet Take 1 tablet by mouth Daily. 90 tablet 0    DULoxetine (CYMBALTA) 60 MG capsule Take 2 capsules by mouth Daily. 180 capsule 0    methylphenidate ER (METADATE ER) 10 MG CR tablet Take 1 tablet by mouth Every Morning. 30 tablet 0    montelukast (SINGULAIR) 10 MG tablet TAKE 1 TABLET BY MOUTH EVERY EVENING 90 tablet 3    traZODone (DESYREL) 50 MG tablet Take 1 tablet by mouth At Night As Needed for Sleep. 90 tablet 0     No current facility-administered medications for this visit.       Physical Exam   No physical exam completed today.     Assessment and Plan   Problem List Items Addressed This Visit          Mental Health    Generalized anxiety disorder (Chronic)    Relevant Medications    traZODone (DESYREL) 50 MG tablet    methylphenidate ER (METADATE ER) 10 MG CR tablet    buPROPion XL (WELLBUTRIN XL) 300 MG 24 hr tablet    DULoxetine (CYMBALTA) 60 MG capsule    Moderate episode of recurrent major depressive disorder (Chronic)    Relevant Medications    traZODone (DESYREL) 50 MG tablet    methylphenidate ER (METADATE ER) 10 MG CR tablet    buPROPion XL (WELLBUTRIN XL) 300 MG 24 hr tablet    DULoxetine  (CYMBALTA) 60 MG capsule    ADHD (attention deficit hyperactivity disorder), inattentive type    Relevant Medications    traZODone (DESYREL) 50 MG tablet    methylphenidate ER (METADATE ER) 10 MG CR tablet    buPROPion XL (WELLBUTRIN XL) 300 MG 24 hr tablet    DULoxetine (CYMBALTA) 60 MG capsule     Other Visit Diagnoses       Insomnia due to mental condition        Relevant Medications    traZODone (DESYREL) 50 MG tablet    methylphenidate ER (METADATE ER) 10 MG CR tablet    buPROPion XL (WELLBUTRIN XL) 300 MG 24 hr tablet    DULoxetine (CYMBALTA) 60 MG capsule          Discussed treatment options with patient.  Patient is currently pleased with her medications.  Continue Cymbalta 120 mg daily for depression and anxiety.  Continue Wellbutrin  mg daily for depression.  Continue trazodone 50 mg nightly as needed for sleep.  Continue Metadate ER 10 mg daily for ADHD.  We will see patient again in 4 weeks to reassess.  Encouraged patient to contact the office if she has any issues sooner.    TREATMENT PLAN/GOALS: Continue supportive psychotherapy efforts and medications as indicated. Treatment and medication options discussed during today's visit. Patient ackowledged and verbally consented to continue with current treatment plan and was educated on the importance of compliance with treatment and follow-up appointments.    DEPRESSION:  Patient screened positive for depression based on a PHQ-9 score of 10 on 11/22/2023. Follow-up recommendations include: Prescribed antidepressant medication treatment.       MEDICATION ISSUES:  We discussed risks, benefits, and side effects of the above medications and the patient was agreeable with the plan. Patient was educated on the importance of compliance with treatment and follow-up appointments.  Patient is agreeable to call the office with any worsening of symptoms or onset of side effects. Patient is agreeable to call 911 or go to the nearest ER should he/she begin having  SI/HI.      Counseled patient regarding multimodal approach with healthy nutrition, healthy sleep, regular physical activity, social activities, counseling, and medications.      Coping skills reviewed and encouraged positive framing of thoughts     Assisted patient in processing above session content; acknowledged and normalized patient’s thoughts, feelings, and concerns.  Applied  positive coping skills and behavior management in session.  Allowed patient to freely discuss issues without interruption or judgment. Provided safe, confidential environment to facilitate the development of positive therapeutic relationship and encourage open, honest communication. Assisted patient in identifying risk factors which would indicate the need for higher level of care including thoughts to harm self or others and/or self-harming behavior and encouraged patient to contact this office, call 911, or present to the nearest emergency room should any of these events occur. Discussed crisis intervention services and means to access. If patient has any concerns or needs assistance they were instructed to call the [facility] at [phone].    MEDS ORDERED DURING VISIT:  New Medications Ordered This Visit   Medications    traZODone (DESYREL) 50 MG tablet     Sig: Take 1 tablet by mouth At Night As Needed for Sleep.     Dispense:  90 tablet     Refill:  0    methylphenidate ER (METADATE ER) 10 MG CR tablet     Sig: Take 1 tablet by mouth Every Morning.     Dispense:  30 tablet     Refill:  0     Fill only when due    buPROPion XL (WELLBUTRIN XL) 300 MG 24 hr tablet     Sig: Take 1 tablet by mouth Daily.     Dispense:  90 tablet     Refill:  0    DULoxetine (CYMBALTA) 60 MG capsule     Sig: Take 2 capsules by mouth Daily.     Dispense:  180 capsule     Refill:  0         Follow Up   Return in about 4 weeks (around 12/20/2023) for Video visit.    Patient was given instructions and counseling regarding her  condition or for health maintenance advice. Please see specific information pulled into the AVS if appropriate.         This document has been electronically signed by SHANNAN Dumont  November 23, 2023 11:57 EST    Part of this note may be an electronic transcription/translation of spoken language to printed text using the Dragon Dictation System.

## 2023-12-09 DIAGNOSIS — J45.20 MILD INTERMITTENT ASTHMA WITHOUT COMPLICATION: ICD-10-CM

## 2023-12-11 RX ORDER — MONTELUKAST SODIUM 10 MG/1
TABLET ORAL
Qty: 90 TABLET | Refills: 3 | Status: SHIPPED | OUTPATIENT
Start: 2023-12-11

## 2024-01-22 DIAGNOSIS — F90.0 ADHD (ATTENTION DEFICIT HYPERACTIVITY DISORDER), INATTENTIVE TYPE: ICD-10-CM

## 2024-01-22 DIAGNOSIS — F33.1 MODERATE EPISODE OF RECURRENT MAJOR DEPRESSIVE DISORDER: Chronic | ICD-10-CM

## 2024-01-22 RX ORDER — BUPROPION HYDROCHLORIDE 300 MG/1
300 TABLET ORAL DAILY
Qty: 90 TABLET | Refills: 0 | Status: SHIPPED | OUTPATIENT
Start: 2024-01-22

## 2024-04-24 ENCOUNTER — PRIOR AUTHORIZATION (OUTPATIENT)
Dept: PSYCHIATRY | Facility: CLINIC | Age: 54
End: 2024-04-24

## 2024-04-24 ENCOUNTER — TELEMEDICINE (OUTPATIENT)
Dept: PSYCHIATRY | Facility: CLINIC | Age: 54
End: 2024-04-24
Payer: COMMERCIAL

## 2024-04-24 DIAGNOSIS — F41.1 GENERALIZED ANXIETY DISORDER: Chronic | ICD-10-CM

## 2024-04-24 DIAGNOSIS — F33.1 MODERATE EPISODE OF RECURRENT MAJOR DEPRESSIVE DISORDER: Chronic | ICD-10-CM

## 2024-04-24 DIAGNOSIS — F90.0 ADHD (ATTENTION DEFICIT HYPERACTIVITY DISORDER), INATTENTIVE TYPE: ICD-10-CM

## 2024-04-24 DIAGNOSIS — F51.05 INSOMNIA DUE TO MENTAL CONDITION: ICD-10-CM

## 2024-04-24 RX ORDER — DEXTROAMPHETAMINE SACCHARATE, AMPHETAMINE ASPARTATE MONOHYDRATE, DEXTROAMPHETAMINE SULFATE AND AMPHETAMINE SULFATE 2.5; 2.5; 2.5; 2.5 MG/1; MG/1; MG/1; MG/1
10 CAPSULE, EXTENDED RELEASE ORAL DAILY
Qty: 30 CAPSULE | Refills: 0 | Status: SHIPPED | OUTPATIENT
Start: 2024-04-24

## 2024-04-24 RX ORDER — BUPROPION HYDROCHLORIDE 300 MG/1
300 TABLET ORAL DAILY
Qty: 90 TABLET | Refills: 0 | Status: SHIPPED | OUTPATIENT
Start: 2024-04-24

## 2024-04-24 RX ORDER — TRAZODONE HYDROCHLORIDE 50 MG/1
50 TABLET ORAL NIGHTLY PRN
Qty: 90 TABLET | Refills: 0 | Status: SHIPPED | OUTPATIENT
Start: 2024-04-24

## 2024-04-24 RX ORDER — DULOXETIN HYDROCHLORIDE 60 MG/1
120 CAPSULE, DELAYED RELEASE ORAL DAILY
Qty: 180 CAPSULE | Refills: 0 | Status: SHIPPED | OUTPATIENT
Start: 2024-04-24

## 2024-04-24 NOTE — PROGRESS NOTES
This provider is located at [place], [place]. The Patient is seen remotely using Video. Patient is being seen via telehealth and confirm that they are in a secure environment for this session. Patient is located in [place], [place] at her home. The patient's condition being diagnosed/treated is appropriate for telemedicine. Provider identified as Ronny Christian as well as credentials APRN MSN PMHNP-BC.   The client/patient gave consent to be seen remotely, and when consent is given they understand that the consent allows for patient identifiable information to be sent to a third party as needed.  They may refuse to be seen remotely at any time. The electronic data is encrypted and password protected, and the patient has been advised of the potential risks to privacy not withstanding such measures.    Chief Complaint  Depression, Anxiety, ADHD, and Sleeping Problem    Subjective        Isabel Landrum presents to [facility] for   History of Present Illness  Patient is seen today for follow-up visit for depression, anxiety, ADHD, and insomnia.  Patient had not been seen since November 2023.  Patient states that she had finals at the end of the year and has just been very busy with the semester.  States she has not taken the Metadate in over a month, but has maintained taking his other medications.  She states her depression and anxiety have remained in a good spot.  She rates both of those at 1-2 on a 1-10 scale with 10 being the worst.  Denies any hopelessness.  Denies any suicidal or homicidal ideation.  Sleep is good.  States she has been working out and working with a nutritionist and has lost 20 pounds, so she is happy about that.  Denies any manic type symptoms.  Denies any paranoia.  Denies any auditory or visual hallucinations.  Denies any side effects to the medications.  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  Patient does inquire about switching from Metadate to Adderall.  Metadate was started by primary care in the beginning and this APRN had discussed Adderall with her in the past.    Objective   Vital Signs:   There were no vitals taken for this visit.      Mental Status Exam:   Hygiene:   good  Cooperation:  Cooperative  Eye Contact:  Good  Psychomotor Behavior:  Appropriate  Affect:  Full range  Mood: normal  Speech:  Normal  Thought Process:  Goal directed  Thought Content:  Normal  Suicidal:  None  Homicidal:  None  Hallucinations:  None  Delusion:  None  Memory:  Intact  Orientation:  Person, Place, Time, and Situation  Reliability:  good  Insight:  Good  Judgement:  Good  Impulse Control:  Good  Physical/Medical Issues:  No      PHQ-9 Depression Screening  Little interest or pleasure in doing things? (P) 1-->several days   Feeling down, depressed, or hopeless? (P) 0-->not at all   Trouble falling or staying asleep, or sleeping too much? (P) 1-->several days   Feeling tired or having little energy? (P) 2-->more than half the days   Poor appetite or overeating? (P) 1-->several days   Feeling bad about yourself - or that you are a failure or have let yourself or your family down? (P) 0-->not at all   Trouble concentrating on things, such as reading the newspaper or watching television? (P) 1-->several days   Moving or speaking so slowly that other people could have noticed? Or the opposite - being so fidgety or restless that you have been moving around a lot more than usual? (P) 0-->not at all   Thoughts that you would be better off dead, or of hurting yourself in some way? (P) 0-->not at all   PHQ-9 Total Score (P) 6   If you checked off any problems, how difficult have these problems made it for you to do your work, take care of things at home, or get along with other people? (P) somewhat difficult        PHQ-9 Total Score: (P) 6     JOEL 7 anxiety  screening tool that patient filled out virtually reviewed by this APRN at today's encounter.    [facility] # [id number] reviewed.    Previous Provider notes and available records reviewed by this APRN today.   Current Medications:   Current Outpatient Medications   Medication Sig Dispense Refill    albuterol sulfate  (90 Base) MCG/ACT inhaler Inhale 2 puffs Every 4 (Four) Hours As Needed for Wheezing or Shortness of Air. 18 g 1    amphetamine-dextroamphetamine XR (Adderall XR) 10 MG 24 hr capsule Take 1 capsule by mouth Daily 30 capsule 0    buPROPion XL (WELLBUTRIN XL) 300 MG 24 hr tablet Take 1 tablet by mouth Daily. 90 tablet 0    DULoxetine (CYMBALTA) 60 MG capsule Take 2 capsules by mouth Daily. 180 capsule 0    montelukast (SINGULAIR) 10 MG tablet TAKE 1 TABLET BY MOUTH EVERY EVENING 90 tablet 3    traZODone (DESYREL) 50 MG tablet Take 1 tablet by mouth At Night As Needed for Sleep. 90 tablet 0     No current facility-administered medications for this visit.       Physical Exam   No physical exam completed today.     Assessment and Plan   Problem List Items Addressed This Visit          Mental Health    Generalized anxiety disorder (Chronic)    Relevant Medications    buPROPion XL (WELLBUTRIN XL) 300 MG 24 hr tablet    DULoxetine (CYMBALTA) 60 MG capsule    traZODone (DESYREL) 50 MG tablet    amphetamine-dextroamphetamine XR (Adderall XR) 10 MG 24 hr capsule    Moderate episode of recurrent major depressive disorder (Chronic)    Relevant Medications    buPROPion XL (WELLBUTRIN XL) 300 MG 24 hr tablet    DULoxetine (CYMBALTA) 60 MG capsule    traZODone (DESYREL) 50 MG tablet    amphetamine-dextroamphetamine XR (Adderall XR) 10 MG 24 hr capsule    ADHD (attention deficit hyperactivity disorder), inattentive type    Relevant Medications    buPROPion XL (WELLBUTRIN XL) 300 MG 24 hr tablet    DULoxetine (CYMBALTA) 60 MG capsule    traZODone (DESYREL) 50 MG tablet    amphetamine-dextroamphetamine XR (Adderall XR)  10 MG 24 hr capsule     Other Visit Diagnoses       Insomnia due to mental condition        Relevant Medications    buPROPion XL (WELLBUTRIN XL) 300 MG 24 hr tablet    DULoxetine (CYMBALTA) 60 MG capsule    traZODone (DESYREL) 50 MG tablet    amphetamine-dextroamphetamine XR (Adderall XR) 10 MG 24 hr capsule          Discussed treatment options with patient.  Continue Wellbutrin  mg daily for depression.  Continue Cymbalta 120 mg daily for depression and anxiety.  Continue trazodone 50 mg nightly as needed for sleep.  Discontinue Metadate ER.  Discussed what we can change her medication to Adderall XR 10 mg for ADHD.  Patient would like to do so.  Discussed with patient that Adderall could last a little longer throughout the day than the Metadate..  We will see patient again in 4 weeks to reassess.  Encouraged patient to contact the office if she has any issues sooner.    TREATMENT PLAN/GOALS: Continue supportive psychotherapy efforts and medications as indicated. Treatment and medication options discussed during today's visit. Patient ackowledged and verbally consented to continue with current treatment plan and was educated on the importance of compliance with treatment and follow-up appointments.    DEPRESSION:  Patient screened positive for depression based on a PHQ-9 score of 6 on 4/23/2024. Follow-up recommendations include: Prescribed antidepressant medication treatment.       MEDICATION ISSUES:  We discussed risks, benefits, and side effects of the above medications and the patient was agreeable with the plan. Patient was educated on the importance of compliance with treatment and follow-up appointments.  Patient is agreeable to call the office with any worsening of symptoms or onset of side effects. Patient is agreeable to call 911 or go to the nearest ER should he/she begin having SI/HI.      Counseled patient regarding multimodal approach with healthy nutrition, healthy sleep, regular physical  activity, social activities, counseling, and medications.      Coping skills reviewed and encouraged positive framing of thoughts     Assisted patient in processing above session content; acknowledged and normalized patient’s thoughts, feelings, and concerns.  Applied  positive coping skills and behavior management in session.  Allowed patient to freely discuss issues without interruption or judgment. Provided safe, confidential environment to facilitate the development of positive therapeutic relationship and encourage open, honest communication. Assisted patient in identifying risk factors which would indicate the need for higher level of care including thoughts to harm self or others and/or self-harming behavior and encouraged patient to contact this office, call 911, or present to the nearest emergency room should any of these events occur. Discussed crisis intervention services and means to access. If patient has any concerns or needs assistance they were instructed to call the [facility] at [phone].    MEDS ORDERED DURING VISIT:  New Medications Ordered This Visit   Medications    buPROPion XL (WELLBUTRIN XL) 300 MG 24 hr tablet     Sig: Take 1 tablet by mouth Daily.     Dispense:  90 tablet     Refill:  0    DULoxetine (CYMBALTA) 60 MG capsule     Sig: Take 2 capsules by mouth Daily.     Dispense:  180 capsule     Refill:  0    traZODone (DESYREL) 50 MG tablet     Sig: Take 1 tablet by mouth At Night As Needed for Sleep.     Dispense:  90 tablet     Refill:  0    amphetamine-dextroamphetamine XR (Adderall XR) 10 MG 24 hr capsule     Sig: Take 1 capsule by mouth Daily     Dispense:  30 capsule     Refill:  0         Follow Up   Return in about 4 weeks (around 5/22/2024) for Video visit.    Patient was given instructions and counseling regarding her condition or for health maintenance advice. Please see specific information pulled into the AVS if appropriate.         This  document has been electronically signed by SHANNAN Dumont  April 24, 2024 14:02 EDT    Part of this note may be an electronic transcription/translation of spoken language to printed text using the Dragon Dictation System.

## 2024-05-22 ENCOUNTER — TELEMEDICINE (OUTPATIENT)
Dept: PSYCHIATRY | Facility: CLINIC | Age: 54
End: 2024-05-22
Payer: COMMERCIAL

## 2024-05-22 DIAGNOSIS — F51.05 INSOMNIA DUE TO MENTAL CONDITION: ICD-10-CM

## 2024-05-22 DIAGNOSIS — F33.1 MODERATE EPISODE OF RECURRENT MAJOR DEPRESSIVE DISORDER: Chronic | ICD-10-CM

## 2024-05-22 DIAGNOSIS — F41.1 GENERALIZED ANXIETY DISORDER: Chronic | ICD-10-CM

## 2024-05-22 DIAGNOSIS — F90.0 ADHD (ATTENTION DEFICIT HYPERACTIVITY DISORDER), INATTENTIVE TYPE: ICD-10-CM

## 2024-05-22 RX ORDER — TRAZODONE HYDROCHLORIDE 50 MG/1
50 TABLET ORAL NIGHTLY PRN
Qty: 90 TABLET | Refills: 0 | Status: SHIPPED | OUTPATIENT
Start: 2024-05-22

## 2024-05-22 RX ORDER — DEXTROAMPHETAMINE SACCHARATE, AMPHETAMINE ASPARTATE MONOHYDRATE, DEXTROAMPHETAMINE SULFATE AND AMPHETAMINE SULFATE 2.5; 2.5; 2.5; 2.5 MG/1; MG/1; MG/1; MG/1
10 CAPSULE, EXTENDED RELEASE ORAL DAILY
Qty: 30 CAPSULE | Refills: 0 | Status: SHIPPED | OUTPATIENT
Start: 2024-05-22

## 2024-05-22 RX ORDER — DULOXETIN HYDROCHLORIDE 60 MG/1
120 CAPSULE, DELAYED RELEASE ORAL DAILY
Qty: 180 CAPSULE | Refills: 0 | Status: SHIPPED | OUTPATIENT
Start: 2024-05-22

## 2024-05-22 RX ORDER — BUPROPION HYDROCHLORIDE 300 MG/1
300 TABLET ORAL DAILY
Qty: 90 TABLET | Refills: 0 | Status: SHIPPED | OUTPATIENT
Start: 2024-05-22

## 2024-05-22 NOTE — PROGRESS NOTES
This provider is located at [place], [place]. The Patient is seen remotely using Video. Patient is being seen via telehealth and confirm that they are in a secure environment for this session. Patient is located in [place], [place] at her work. The patient's condition being diagnosed/treated is appropriate for telemedicine. Provider identified as Ronny Christian as well as credentials APRN MSN PMHNP-BC.   The client/patient gave consent to be seen remotely, and when consent is given they understand that the consent allows for patient identifiable information to be sent to a third party as needed.  They may refuse to be seen remotely at any time. The electronic data is encrypted and password protected, and the patient has been advised of the potential risks to privacy not withstanding such measures.    Chief Complaint  Depression, Anxiety, ADHD, and Sleeping Problem    Subjective        Isabel Landrum presents to [facility] for   History of Present Illness  Patient is seen today for follow-up visit for depression, anxiety, ADHD, and insomnia.  Patient reports that she really lack the Adderall better than the Metadate.  She states she felt like she got a end of the day crash with the Metadate and she has not noticed that with the Adderall.  States it has been helpful in controlling her focus and attention symptoms.  She states overall her depression and anxiety have been under control.  Depression is a 1-2 on a 1-10 scale with 10 being the worst.  Denies any hopelessness.  Denies any suicidal or homicidal ideation.  States her sleep and appetite are good.  Rates anxiety as a 3 on a 1-10 scale with 10 being the worst.  States there have been a few nights when she has felt anxious in the afternoons, but she has been using some coping skills to reframe her mind when that happens.  States she is currently pleased with her medications.  Denies any manic type symptoms.  Denies any  paranoia.  Denies any auditory or visual hallucinations.  States she has some papers to grade for the end of the semester and then she is going to take a few days off.  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  Denies any side effects to the medications.  Objective   Vital Signs:   There were no vitals taken for this visit.      Mental Status Exam:   Hygiene:   good  Cooperation:  Cooperative  Eye Contact:  Good  Psychomotor Behavior:  Appropriate  Affect:  Full range  Mood: normal  Speech:  Normal  Thought Process:  Goal directed  Thought Content:  Normal  Suicidal:  None  Homicidal:  None  Hallucinations:  None  Delusion:  None  Memory:  Intact  Orientation:  Person, Place, Time, and Situation  Reliability:  good  Insight:  Good  Judgement:  Good  Impulse Control:  Good  Physical/Medical Issues:  No      PHQ-9 Depression Screening  Little interest or pleasure in doing things?     Feeling down, depressed, or hopeless? (P) 1-->several days   Trouble falling or staying asleep, or sleeping too much? (P) 0-->not at all   Feeling tired or having little energy? (P) 1-->several days   Poor appetite or overeating? (P) 1-->several days   Feeling bad about yourself - or that you are a failure or have let yourself or your family down? (P) 1-->several days   Trouble concentrating on things, such as reading the newspaper or watching television? (P) 1-->several days   Moving or speaking so slowly that other people could have noticed? Or the opposite - being so fidgety or restless that you have been moving around a lot more than usual? (P) 0-->not at all   Thoughts that you would be better off dead, or of hurting yourself in some way? (P) 0-->not at all   PHQ-9 Total Score     If you checked off any problems, how difficult have these problems made it for you to do your work, take care of things at home, or get along with other people? (P) somewhat difficult        PHQ-9 Total Score:        JOEL 7 anxiety screening tool that patient filled out virtually reviewed by this APRN at today's encounter.    [facility] request number [id number] reviewed by this APRN at today's encounter.    Previous Provider notes and available records reviewed by this APRN today.   Current Medications:   Current Outpatient Medications   Medication Sig Dispense Refill    albuterol sulfate  (90 Base) MCG/ACT inhaler Inhale 2 puffs Every 4 (Four) Hours As Needed for Wheezing or Shortness of Air. 18 g 1    amphetamine-dextroamphetamine XR (Adderall XR) 10 MG 24 hr capsule Take 1 capsule by mouth Daily 30 capsule 0    buPROPion XL (WELLBUTRIN XL) 300 MG 24 hr tablet Take 1 tablet by mouth Daily. 90 tablet 0    DULoxetine (CYMBALTA) 60 MG capsule Take 2 capsules by mouth Daily. 180 capsule 0    montelukast (SINGULAIR) 10 MG tablet TAKE 1 TABLET BY MOUTH EVERY EVENING 90 tablet 3    traZODone (DESYREL) 50 MG tablet Take 1 tablet by mouth At Night As Needed for Sleep. 90 tablet 0     No current facility-administered medications for this visit.       Physical Exam   No physical exam completed today.     Assessment and Plan   Problem List Items Addressed This Visit          Mental Health    Generalized anxiety disorder (Chronic)    Relevant Medications    DULoxetine (CYMBALTA) 60 MG capsule    buPROPion XL (WELLBUTRIN XL) 300 MG 24 hr tablet    amphetamine-dextroamphetamine XR (Adderall XR) 10 MG 24 hr capsule    traZODone (DESYREL) 50 MG tablet    Moderate episode of recurrent major depressive disorder (Chronic)    Relevant Medications    DULoxetine (CYMBALTA) 60 MG capsule    buPROPion XL (WELLBUTRIN XL) 300 MG 24 hr tablet    amphetamine-dextroamphetamine XR (Adderall XR) 10 MG 24 hr capsule    traZODone (DESYREL) 50 MG tablet    ADHD (attention deficit hyperactivity disorder), inattentive type    Relevant Medications    DULoxetine (CYMBALTA) 60 MG capsule    buPROPion XL (WELLBUTRIN XL) 300 MG 24 hr tablet     amphetamine-dextroamphetamine XR (Adderall XR) 10 MG 24 hr capsule    traZODone (DESYREL) 50 MG tablet     Other Visit Diagnoses       Insomnia due to mental condition        Relevant Medications    DULoxetine (CYMBALTA) 60 MG capsule    buPROPion XL (WELLBUTRIN XL) 300 MG 24 hr tablet    amphetamine-dextroamphetamine XR (Adderall XR) 10 MG 24 hr capsule    traZODone (DESYREL) 50 MG tablet          Discussed treatment options with patient  Patient is currently pleased with her medications.  Continue Cymbalta 120 mg daily for depression and anxiety.  Continue Wellbutrin  mg daily for depression.  Continue trazodone 50 mg nightly as needed for sleep.  Continue Adderall XR 10 mg daily for ADHD.  We will see patient again in 4 weeks to reassess.  Encouraged patient to contact the office if she has any issues sooner.      TREATMENT PLAN/GOALS: Continue supportive psychotherapy efforts and medications as indicated. Treatment and medication options discussed during today's visit. Patient ackowledged and verbally consented to continue with current treatment plan and was educated on the importance of compliance with treatment and follow-up appointments.    DEPRESSION:  Patient screened positive for depression based on a PHQ-9 score of 6 on 4/23/2024. Follow-up recommendations include: Prescribed antidepressant medication treatment.       MEDICATION ISSUES:  We discussed risks, benefits, and side effects of the above medications and the patient was agreeable with the plan. Patient was educated on the importance of compliance with treatment and follow-up appointments.  Patient is agreeable to call the office with any worsening of symptoms or onset of side effects. Patient is agreeable to call 911 or go to the nearest ER should he/she begin having SI/HI.      Counseled patient regarding multimodal approach with healthy nutrition, healthy sleep, regular physical activity, social activities, counseling, and medications.       Coping skills reviewed and encouraged positive framing of thoughts     Assisted patient in processing above session content; acknowledged and normalized patient’s thoughts, feelings, and concerns.  Applied  positive coping skills and behavior management in session.  Allowed patient to freely discuss issues without interruption or judgment. Provided safe, confidential environment to facilitate the development of positive therapeutic relationship and encourage open, honest communication. Assisted patient in identifying risk factors which would indicate the need for higher level of care including thoughts to harm self or others and/or self-harming behavior and encouraged patient to contact this office, call 911, or present to the nearest emergency room should any of these events occur. Discussed crisis intervention services and means to access. If patient has any concerns or needs assistance they were instructed to call the [facility] at [phone].    MEDS ORDERED DURING VISIT:  New Medications Ordered This Visit   Medications    DULoxetine (CYMBALTA) 60 MG capsule     Sig: Take 2 capsules by mouth Daily.     Dispense:  180 capsule     Refill:  0    buPROPion XL (WELLBUTRIN XL) 300 MG 24 hr tablet     Sig: Take 1 tablet by mouth Daily.     Dispense:  90 tablet     Refill:  0    amphetamine-dextroamphetamine XR (Adderall XR) 10 MG 24 hr capsule     Sig: Take 1 capsule by mouth Daily     Dispense:  30 capsule     Refill:  0    traZODone (DESYREL) 50 MG tablet     Sig: Take 1 tablet by mouth At Night As Needed for Sleep.     Dispense:  90 tablet     Refill:  0         Follow Up   Return in about 4 weeks (around 6/19/2024) for Video visit.    Patient was given instructions and counseling regarding her condition or for health maintenance advice. Please see specific information pulled into the AVS if appropriate.         This document has been electronically signed by Ronny Christian  APRN  May 22, 2024 13:29 EDT    Part of this note may be an electronic transcription/translation of spoken language to printed text using the Dragon Dictation System.

## 2024-07-16 ENCOUNTER — TELEMEDICINE (OUTPATIENT)
Dept: PSYCHIATRY | Facility: CLINIC | Age: 54
End: 2024-07-16
Payer: COMMERCIAL

## 2024-07-16 DIAGNOSIS — F33.1 MODERATE EPISODE OF RECURRENT MAJOR DEPRESSIVE DISORDER: Primary | Chronic | ICD-10-CM

## 2024-07-16 DIAGNOSIS — F90.0 ADHD (ATTENTION DEFICIT HYPERACTIVITY DISORDER), INATTENTIVE TYPE: ICD-10-CM

## 2024-07-16 DIAGNOSIS — F51.05 INSOMNIA DUE TO MENTAL CONDITION: ICD-10-CM

## 2024-07-16 DIAGNOSIS — F41.1 GENERALIZED ANXIETY DISORDER: Chronic | ICD-10-CM

## 2024-07-16 PROCEDURE — 99214 OFFICE O/P EST MOD 30 MIN: CPT

## 2024-07-16 RX ORDER — BUPROPION HYDROCHLORIDE 300 MG/1
300 TABLET ORAL DAILY
Qty: 90 TABLET | Refills: 0 | Status: SHIPPED | OUTPATIENT
Start: 2024-07-16

## 2024-07-16 RX ORDER — DULOXETIN HYDROCHLORIDE 60 MG/1
120 CAPSULE, DELAYED RELEASE ORAL DAILY
Qty: 180 CAPSULE | Refills: 0 | Status: SHIPPED | OUTPATIENT
Start: 2024-07-16

## 2024-07-16 RX ORDER — TRAZODONE HYDROCHLORIDE 50 MG/1
50 TABLET ORAL NIGHTLY PRN
Qty: 90 TABLET | Refills: 0 | Status: SHIPPED | OUTPATIENT
Start: 2024-07-16

## 2024-07-16 RX ORDER — DEXTROAMPHETAMINE SACCHARATE, AMPHETAMINE ASPARTATE MONOHYDRATE, DEXTROAMPHETAMINE SULFATE AND AMPHETAMINE SULFATE 2.5; 2.5; 2.5; 2.5 MG/1; MG/1; MG/1; MG/1
10 CAPSULE, EXTENDED RELEASE ORAL DAILY
Qty: 30 CAPSULE | Refills: 0 | Status: SHIPPED | OUTPATIENT
Start: 2024-07-16

## 2024-07-16 NOTE — PROGRESS NOTES
This provider is located at [place], [place]. The Patient is seen remotely using Video. Patient is being seen via telehealth and confirm that they are in a secure environment for this session. Patient is located in [place], [place] at her home. The patient's condition being diagnosed/treated is appropriate for telemedicine. Provider identified as Ronny Christian as well as credentials APRN MSN PMHNP-BC.   The client/patient gave consent to be seen remotely, and when consent is given they understand that the consent allows for patient identifiable information to be sent to a third party as needed.  They may refuse to be seen remotely at any time. The electronic data is encrypted and password protected, and the patient has been advised of the potential risks to privacy not withstanding such measures.    Chief Complaint  Depression, Anxiety, ADHD, and Sleeping Problem    Subjective        Isabel Landrum presents to [facility] for   History of Present Illness  Patient is seen today for follow-up visit for depression, anxiety, ADHD, and insomnia.  Patient reports she is doing well.  States she is currently pleased with her medications.  States her depression is under control.  Denies any hopelessness.  Denies any suicidal or homicidal ideation.  States her sleep and appetite is good.  States her anxiety is under control.  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  Denies any manic type symptoms.  Denies any paranoia.  Denies any auditory or visual hallucinations.  Denies any side effects to the medications.  States Adderall XR seems to work better for her than the Metadate did.  States she would like to continue the 10 mg dose of Adderall XR.  States she is able to focus on the things that she needs to get done individually, whereas before she would just have a bunch of things going at the same time and not complete any of them.  Objective   Vital Signs:    There were no vitals taken for this visit.      Mental Status Exam:   Hygiene:   good  Cooperation:  Cooperative  Eye Contact:  Good  Psychomotor Behavior:  Appropriate  Affect:  Full range  Mood: normal  Speech:  Normal  Thought Process:  Goal directed  Thought Content:  Normal  Suicidal:  None  Homicidal:  None  Hallucinations:  None  Delusion:  None  Memory:  Intact  Orientation:  Person, Place, Time, and Situation  Reliability:  good  Insight:  Good  Judgement:  Good  Impulse Control:  Good  Physical/Medical Issues:  No      PHQ-9 Depression Screening  Little interest or pleasure in doing things? (P) 0-->not at all   Feeling down, depressed, or hopeless? (P) 0-->not at all   Trouble falling or staying asleep, or sleeping too much? (P) 1-->several days   Feeling tired or having little energy? (P) 1-->several days   Poor appetite or overeating? (P) 0-->not at all   Feeling bad about yourself - or that you are a failure or have let yourself or your family down? (P) 0-->not at all   Trouble concentrating on things, such as reading the newspaper or watching television? (P) 1-->several days   Moving or speaking so slowly that other people could have noticed? Or the opposite - being so fidgety or restless that you have been moving around a lot more than usual? (P) 1-->several days   Thoughts that you would be better off dead, or of hurting yourself in some way? (P) 0-->not at all   PHQ-9 Total Score (P) 4   If you checked off any problems, how difficult have these problems made it for you to do your work, take care of things at home, or get along with other people? (P) somewhat difficult        PHQ-9 Total Score: (P) 4     JOEL 7 anxiety screening tool that patient filled out virtually reviewed by this APRN at today's encounter.    [facility] request number [id number] reviewed by this APRN at today's encounter.    Previous Provider notes and available records reviewed by this APRN today.   Current Medications:   Current  Outpatient Medications   Medication Sig Dispense Refill    albuterol sulfate  (90 Base) MCG/ACT inhaler Inhale 2 puffs Every 4 (Four) Hours As Needed for Wheezing or Shortness of Air. 18 g 1    amphetamine-dextroamphetamine XR (Adderall XR) 10 MG 24 hr capsule Take 1 capsule by mouth Daily 30 capsule 0    buPROPion XL (WELLBUTRIN XL) 300 MG 24 hr tablet Take 1 tablet by mouth Daily. 90 tablet 0    DULoxetine (CYMBALTA) 60 MG capsule Take 2 capsules by mouth Daily. 180 capsule 0    montelukast (SINGULAIR) 10 MG tablet TAKE 1 TABLET BY MOUTH EVERY EVENING 90 tablet 3    traZODone (DESYREL) 50 MG tablet Take 1 tablet by mouth At Night As Needed for Sleep. 90 tablet 0     No current facility-administered medications for this visit.       Physical Exam   No physical exam completed today.     Assessment and Plan   Problem List Items Addressed This Visit          Mental Health    Generalized anxiety disorder (Chronic)    Relevant Medications    amphetamine-dextroamphetamine XR (Adderall XR) 10 MG 24 hr capsule    buPROPion XL (WELLBUTRIN XL) 300 MG 24 hr tablet    DULoxetine (CYMBALTA) 60 MG capsule    traZODone (DESYREL) 50 MG tablet    Moderate episode of recurrent major depressive disorder - Primary (Chronic)    Relevant Medications    amphetamine-dextroamphetamine XR (Adderall XR) 10 MG 24 hr capsule    buPROPion XL (WELLBUTRIN XL) 300 MG 24 hr tablet    DULoxetine (CYMBALTA) 60 MG capsule    traZODone (DESYREL) 50 MG tablet    ADHD (attention deficit hyperactivity disorder), inattentive type    Relevant Medications    amphetamine-dextroamphetamine XR (Adderall XR) 10 MG 24 hr capsule    buPROPion XL (WELLBUTRIN XL) 300 MG 24 hr tablet    DULoxetine (CYMBALTA) 60 MG capsule    traZODone (DESYREL) 50 MG tablet     Other Visit Diagnoses       Insomnia due to mental condition        Relevant Medications    amphetamine-dextroamphetamine XR (Adderall XR) 10 MG 24 hr capsule    buPROPion XL (WELLBUTRIN XL) 300 MG 24  hr tablet    DULoxetine (CYMBALTA) 60 MG capsule    traZODone (DESYREL) 50 MG tablet              TREATMENT PLAN/GOALS: Continue supportive psychotherapy efforts and medications as indicated. Treatment and medication options discussed during today's visit. Patient ackowledged and verbally consented to continue with current treatment plan and was educated on the importance of compliance with treatment and follow-up appointments.    DEPRESSION:  Patient screened positive for depression based on a PHQ-9 score of 4 on 7/16/2024. Follow-up recommendations include: Prescribed antidepressant medication treatment.       MEDICATION ISSUES:  We discussed risks, benefits, and side effects of the above medications and the patient was agreeable with the plan. Patient was educated on the importance of compliance with treatment and follow-up appointments.  Patient is agreeable to call the office with any worsening of symptoms or onset of side effects. Patient is agreeable to call 911 or go to the nearest ER should he/she begin having SI/HI.      Counseled patient regarding multimodal approach with healthy nutrition, healthy sleep, regular physical activity, social activities, counseling, and medications.      Coping skills reviewed and encouraged positive framing of thoughts     Assisted patient in processing above session content; acknowledged and normalized patient’s thoughts, feelings, and concerns.  Applied  positive coping skills and behavior management in session.  Allowed patient to freely discuss issues without interruption or judgment. Provided safe, confidential environment to facilitate the development of positive therapeutic relationship and encourage open, honest communication. Assisted patient in identifying risk factors which would indicate the need for higher level of care including thoughts to harm self or others and/or self-harming behavior and encouraged patient to contact this office, call 911, or present to  the nearest emergency room should any of these events occur. Discussed crisis intervention services and means to access. If patient has any concerns or needs assistance they were instructed to call the [facility] at [phone].    MEDS ORDERED DURING VISIT:  New Medications Ordered This Visit   Medications    amphetamine-dextroamphetamine XR (Adderall XR) 10 MG 24 hr capsule     Sig: Take 1 capsule by mouth Daily     Dispense:  30 capsule     Refill:  0    buPROPion XL (WELLBUTRIN XL) 300 MG 24 hr tablet     Sig: Take 1 tablet by mouth Daily.     Dispense:  90 tablet     Refill:  0    DULoxetine (CYMBALTA) 60 MG capsule     Sig: Take 2 capsules by mouth Daily.     Dispense:  180 capsule     Refill:  0    traZODone (DESYREL) 50 MG tablet     Sig: Take 1 tablet by mouth At Night As Needed for Sleep.     Dispense:  90 tablet     Refill:  0         Follow Up   Return in about 4 weeks (around 8/13/2024) for Video visit.    Patient was given instructions and counseling regarding her condition or for health maintenance advice. Please see specific information pulled into the AVS if appropriate.         This document has been electronically signed by SHANNAN Dumont  July 16, 2024 13:33 EDT    Part of this note may be an electronic transcription/translation of spoken language to printed text using the Dragon Dictation System.

## 2024-08-08 DIAGNOSIS — F33.1 MODERATE EPISODE OF RECURRENT MAJOR DEPRESSIVE DISORDER: Chronic | ICD-10-CM

## 2024-08-08 DIAGNOSIS — F41.1 GENERALIZED ANXIETY DISORDER: Chronic | ICD-10-CM

## 2024-08-13 ENCOUNTER — TELEMEDICINE (OUTPATIENT)
Dept: PSYCHIATRY | Facility: CLINIC | Age: 54
End: 2024-08-13
Payer: COMMERCIAL

## 2024-08-13 DIAGNOSIS — F41.1 GENERALIZED ANXIETY DISORDER: Chronic | ICD-10-CM

## 2024-08-13 DIAGNOSIS — F51.05 INSOMNIA DUE TO MENTAL CONDITION: ICD-10-CM

## 2024-08-13 DIAGNOSIS — F90.0 ADHD (ATTENTION DEFICIT HYPERACTIVITY DISORDER), INATTENTIVE TYPE: Primary | ICD-10-CM

## 2024-08-13 DIAGNOSIS — F33.1 MODERATE EPISODE OF RECURRENT MAJOR DEPRESSIVE DISORDER: Chronic | ICD-10-CM

## 2024-08-13 PROCEDURE — 99214 OFFICE O/P EST MOD 30 MIN: CPT

## 2024-08-13 RX ORDER — DULOXETIN HYDROCHLORIDE 60 MG/1
CAPSULE, DELAYED RELEASE ORAL
Qty: 180 CAPSULE | Refills: 0 | OUTPATIENT
Start: 2024-08-13

## 2024-08-13 RX ORDER — DULOXETIN HYDROCHLORIDE 60 MG/1
120 CAPSULE, DELAYED RELEASE ORAL DAILY
Qty: 180 CAPSULE | Refills: 0 | Status: SHIPPED | OUTPATIENT
Start: 2024-08-13

## 2024-08-13 RX ORDER — BUPROPION HYDROCHLORIDE 300 MG/1
300 TABLET ORAL DAILY
Qty: 90 TABLET | Refills: 0 | Status: SHIPPED | OUTPATIENT
Start: 2024-08-13

## 2024-08-13 RX ORDER — TRAZODONE HYDROCHLORIDE 50 MG/1
50 TABLET ORAL NIGHTLY PRN
Qty: 90 TABLET | Refills: 0 | Status: SHIPPED | OUTPATIENT
Start: 2024-08-13

## 2024-08-13 NOTE — PROGRESS NOTES
This provider is located at [place], [place]. The Patient is seen remotely using Video. Patient is being seen via telehealth and confirm that they are in a secure environment for this session. Patient is located in [place], [place] at her home. The patient's condition being diagnosed/treated is appropriate for telemedicine. Provider identified as Ronny Christian as well as credentials APRN MSN PMHNP-BC.   The client/patient gave consent to be seen remotely, and when consent is given they understand that the consent allows for patient identifiable information to be sent to a third party as needed.  They may refuse to be seen remotely at any time. The electronic data is encrypted and password protected, and the patient has been advised of the potential risks to privacy not withstanding such measures.    Chief Complaint  Depression, Anxiety, ADHD, and Sleeping Problem    Subjective        Isabel Landrum presents to [facility] for   History of Present Illness  Patient is seen today for follow-up visit for depression, anxiety, ADHD, and insomnia.  Patient reports that she has been doing well.  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  States she is pleased with her medications.  States depression and anxiety has been under control.  Sleep and appetite are good.  States Adderall continues to help with her focus and attention symptoms. Denies hopelessness. Denies suicidal or homicidal ideation. States she is currently pleased with her medications. Denies any manic type symptoms. Denies any paranoia. Denies any auditory of visual hallucinations. Denies any side effects to the medications.   Objective   Vital Signs:   There were no vitals taken for this visit.      Mental Status Exam:   Hygiene:   good  Cooperation:  Cooperative  Eye Contact:   Good  Psychomotor Behavior:  Appropriate  Affect:  Full range  Mood: normal  Speech:  Normal  Thought Process:  Goal directed  Thought Content:  Normal  Suicidal:  None  Homicidal:  None  Hallucinations:  None  Delusion:  None  Memory:  Intact  Orientation:  Person, Place, Time, and Situation  Reliability:  good  Insight:  Good  Judgement:  Good  Impulse Control:  Good  Physical/Medical Issues:  No      PHQ-9 Depression Screening  Little interest or pleasure in doing things? (P) 0-->not at all   Feeling down, depressed, or hopeless? (P) 1-->several days   Trouble falling or staying asleep, or sleeping too much? (P) 1-->several days   Feeling tired or having little energy? (P) 1-->several days   Poor appetite or overeating? (P) 0-->not at all   Feeling bad about yourself - or that you are a failure or have let yourself or your family down? (P) 1-->several days   Trouble concentrating on things, such as reading the newspaper or watching television? (P) 1-->several days   Moving or speaking so slowly that other people could have noticed? Or the opposite - being so fidgety or restless that you have been moving around a lot more than usual? (P) 0-->not at all   Thoughts that you would be better off dead, or of hurting yourself in some way? (P) 0-->not at all   PHQ-9 Total Score (P) 5   If you checked off any problems, how difficult have these problems made it for you to do your work, take care of things at home, or get along with other people? (P) somewhat difficult        PHQ-9 Total Score: (P) 5     JOEL 7 anxiety screening tool that patient filled out virtually reviewed by this APRN at today's encounter.    [facility] request number [id number] reviewed by this APRN at today's encounter.    Previous Provider notes and available records reviewed by this APRN today.   Current Medications:   Current Outpatient Medications   Medication Sig Dispense Refill    albuterol sulfate  (90 Base) MCG/ACT inhaler Inhale 2 puffs  Every 4 (Four) Hours As Needed for Wheezing or Shortness of Air. 18 g 1    amphetamine-dextroamphetamine XR (Adderall XR) 10 MG 24 hr capsule Take 1 capsule by mouth Daily 30 capsule 0    buPROPion XL (WELLBUTRIN XL) 300 MG 24 hr tablet Take 1 tablet by mouth Daily. 90 tablet 0    DULoxetine (CYMBALTA) 60 MG capsule Take 2 capsules by mouth Daily. 180 capsule 0    montelukast (SINGULAIR) 10 MG tablet TAKE 1 TABLET BY MOUTH EVERY EVENING 90 tablet 3    traZODone (DESYREL) 50 MG tablet Take 1 tablet by mouth At Night As Needed for Sleep. 90 tablet 0     No current facility-administered medications for this visit.       Physical Exam   No physical exam completed today.       Assessment and Plan   Problem List Items Addressed This Visit          Mental Health    Generalized anxiety disorder (Chronic)    Relevant Medications    traZODone (DESYREL) 50 MG tablet    DULoxetine (CYMBALTA) 60 MG capsule    buPROPion XL (WELLBUTRIN XL) 300 MG 24 hr tablet    Moderate episode of recurrent major depressive disorder (Chronic)    Relevant Medications    traZODone (DESYREL) 50 MG tablet    DULoxetine (CYMBALTA) 60 MG capsule    buPROPion XL (WELLBUTRIN XL) 300 MG 24 hr tablet    ADHD (attention deficit hyperactivity disorder), inattentive type - Primary    Relevant Medications    traZODone (DESYREL) 50 MG tablet    DULoxetine (CYMBALTA) 60 MG capsule    buPROPion XL (WELLBUTRIN XL) 300 MG 24 hr tablet     Other Visit Diagnoses       Insomnia due to mental condition        Relevant Medications    traZODone (DESYREL) 50 MG tablet    DULoxetine (CYMBALTA) 60 MG capsule    buPROPion XL (WELLBUTRIN XL) 300 MG 24 hr tablet          Discussed treatment options with patient.  Patient is currently pleased with her medications.  Continue Adderall 10 mg daily for ADHD.  Patient will call when a refill is due.  Continue Wellbutrin  mg daily for depression.  Continue Cymbalta 120 mg daily for depression and anxiety.  Continue trazodone 50  mg nightly as needed for sleep.  We will see patient again in 6 weeks to reassess.  Encouraged patient to contact the office if she has any issues sooner.    TREATMENT PLAN/GOALS: Continue supportive psychotherapy efforts and medications as indicated. Treatment and medication options discussed during today's visit. Patient ackowledged and verbally consented to continue with current treatment plan and was educated on the importance of compliance with treatment and follow-up appointments.    DEPRESSION:  Patient screened positive for depression based on a PHQ-9 score of 5 on 8/6/2024. Follow-up recommendations include: Prescribed antidepressant medication treatment.       MEDICATION ISSUES:  We discussed risks, benefits, and side effects of the above medications and the patient was agreeable with the plan. Patient was educated on the importance of compliance with treatment and follow-up appointments.  Patient is agreeable to call the office with any worsening of symptoms or onset of side effects. Patient is agreeable to call 911 or go to the nearest ER should he/she begin having SI/HI.      Counseled patient regarding multimodal approach with healthy nutrition, healthy sleep, regular physical activity, social activities, counseling, and medications.      Coping skills reviewed and encouraged positive framing of thoughts     Assisted patient in processing above session content; acknowledged and normalized patient’s thoughts, feelings, and concerns.  Applied  positive coping skills and behavior management in session.  Allowed patient to freely discuss issues without interruption or judgment. Provided safe, confidential environment to facilitate the development of positive therapeutic relationship and encourage open, honest communication. Assisted patient in identifying risk factors which would indicate the need for higher level of care including thoughts to harm self or others and/or self-harming behavior and encouraged  patient to contact this office, call 911, or present to the nearest emergency room should any of these events occur. Discussed crisis intervention services and means to access. If patient has any concerns or needs assistance they were instructed to call the [facility] at [phone].    MEDS ORDERED DURING VISIT:  New Medications Ordered This Visit   Medications    traZODone (DESYREL) 50 MG tablet     Sig: Take 1 tablet by mouth At Night As Needed for Sleep.     Dispense:  90 tablet     Refill:  0    DULoxetine (CYMBALTA) 60 MG capsule     Sig: Take 2 capsules by mouth Daily.     Dispense:  180 capsule     Refill:  0    buPROPion XL (WELLBUTRIN XL) 300 MG 24 hr tablet     Sig: Take 1 tablet by mouth Daily.     Dispense:  90 tablet     Refill:  0         Follow Up   Return in about 4 weeks (around 9/10/2024) for Video visit.    Patient was given instructions and counseling regarding her condition or for health maintenance advice. Please see specific information pulled into the AVS if appropriate.         This document has been electronically signed by SHANNAN Dumont  August 13, 2024 16:42 EDT    Part of this note may be an electronic transcription/translation of spoken language to printed text using the Dragon Dictation System.

## 2024-09-17 ENCOUNTER — TELEPHONE (OUTPATIENT)
Dept: PSYCHIATRY | Facility: CLINIC | Age: 54
End: 2024-09-17
Payer: COMMERCIAL

## 2024-09-19 DIAGNOSIS — F90.0 ADHD (ATTENTION DEFICIT HYPERACTIVITY DISORDER), INATTENTIVE TYPE: ICD-10-CM

## 2024-09-19 RX ORDER — DEXTROAMPHETAMINE SACCHARATE, AMPHETAMINE ASPARTATE MONOHYDRATE, DEXTROAMPHETAMINE SULFATE AND AMPHETAMINE SULFATE 2.5; 2.5; 2.5; 2.5 MG/1; MG/1; MG/1; MG/1
10 CAPSULE, EXTENDED RELEASE ORAL DAILY
Qty: 30 CAPSULE | Refills: 0 | Status: SHIPPED | OUTPATIENT
Start: 2024-09-19

## 2024-10-02 ENCOUNTER — TELEMEDICINE (OUTPATIENT)
Dept: PSYCHIATRY | Facility: CLINIC | Age: 54
End: 2024-10-02
Payer: COMMERCIAL

## 2024-10-02 DIAGNOSIS — F90.0 ADHD (ATTENTION DEFICIT HYPERACTIVITY DISORDER), INATTENTIVE TYPE: ICD-10-CM

## 2024-10-02 DIAGNOSIS — F51.05 INSOMNIA DUE TO MENTAL CONDITION: ICD-10-CM

## 2024-10-02 DIAGNOSIS — F33.1 MODERATE EPISODE OF RECURRENT MAJOR DEPRESSIVE DISORDER: Primary | Chronic | ICD-10-CM

## 2024-10-02 DIAGNOSIS — F41.1 GENERALIZED ANXIETY DISORDER: Chronic | ICD-10-CM

## 2024-10-02 RX ORDER — TRAZODONE HYDROCHLORIDE 50 MG/1
50 TABLET, FILM COATED ORAL NIGHTLY PRN
Qty: 90 TABLET | Refills: 0 | Status: SHIPPED | OUTPATIENT
Start: 2024-10-02

## 2024-10-02 RX ORDER — BUPROPION HYDROCHLORIDE 300 MG/1
300 TABLET ORAL DAILY
Qty: 90 TABLET | Refills: 0 | Status: SHIPPED | OUTPATIENT
Start: 2024-10-02

## 2024-10-02 RX ORDER — DULOXETIN HYDROCHLORIDE 60 MG/1
120 CAPSULE, DELAYED RELEASE ORAL DAILY
Qty: 180 CAPSULE | Refills: 0 | Status: SHIPPED | OUTPATIENT
Start: 2024-10-02

## 2024-10-02 NOTE — PROGRESS NOTES
This provider is located at [place], [place]. The Patient is seen remotely using Video. Patient is being seen via telehealth and confirm that they are in a secure environment for this session. Patient is located in [place], [place] at her work. The patient's condition being diagnosed/treated is appropriate for telemedicine. Provider identified as Ronny Christian as well as credentials APRN MSN PMHNP-BC.   The client/patient gave consent to be seen remotely, and when consent is given they understand that the consent allows for patient identifiable information to be sent to a third party as needed.  They may refuse to be seen remotely at any time. The electronic data is encrypted and password protected, and the patient has been advised of the potential risks to privacy not withstanding such measures.    Chief Complaint  Depression, Anxiety, Sleeping Problem, and ADHD    Subjective        Isabel Landrum presents to [facility] for   History of Present Illness  Patient seen today for follow-up visit for depression, anxiety, ADHD, and insomnia.  Patient reports she is doing well.  States she did apply for tenure and has received some positive feedback about that.  States she hopes to find out in November if she gets it or not.  States this would give her more stability and be less stressful if she gets that.  States school is going well.  States has been busy, but she is managing.  She states she feels she has had some low level of depression and anxiety, but it is manageable.  States she is currently pleased with her medications.  Denies any hopelessness.  Denies any suicidal or homicidal ideation.  States sleep is good with the help of trazodone.  Appetite is good.  Denies any manic type symptoms.  Denies any paranoia.  Denies any auditory or visual hallucinations.  States she has been taking the Adderall about 4 times per week.  States it continues to be helpful with her focus  and attention and her productivity.  Denies any side effects to the medications.  Objective   Vital Signs:   There were no vitals taken for this visit.      Mental Status Exam:   Hygiene:   good  Cooperation:  Cooperative  Eye Contact:  Good  Psychomotor Behavior:  Appropriate  Affect:  Full range  Mood: normal  Speech:  Normal  Thought Process:  Goal directed  Thought Content:  Normal  Suicidal:  None  Homicidal:  None  Hallucinations:  None  Delusion:  None  Memory:  Intact  Orientation:  Person, Place, Time, and Situation  Reliability:  good  Insight:  Good  Judgement:  Good  Impulse Control:  Good  Physical/Medical Issues:  No      PHQ-9 Depression Screening  Little interest or pleasure in doing things? 1-->several days   Feeling down, depressed, or hopeless? 1-->several days   Trouble falling or staying asleep, or sleeping too much? 1-->several days   Feeling tired or having little energy? 1-->several days   Poor appetite or overeating? 1-->several days   Feeling bad about yourself - or that you are a failure or have let yourself or your family down? 0-->not at all   Trouble concentrating on things, such as reading the newspaper or watching television? 1-->several days   Moving or speaking so slowly that other people could have noticed? Or the opposite - being so fidgety or restless that you have been moving around a lot more than usual? 0-->not at all   Thoughts that you would be better off dead, or of hurting yourself in some way? 0-->not at all   PHQ-9 Total Score 6   If you checked off any problems, how difficult have these problems made it for you to do your work, take care of things at home, or get along with other people? somewhat difficult        PHQ-9 Total Score: 6     JOEL 7 anxiety screening tool that patient filled out virtually reviewed by this APRN at today's encounter.    [facility] request number [id number] reviewed by this APRN at today's encounter.    Previous Provider notes and available records  reviewed by this APRN today.   Current Medications:   Current Outpatient Medications   Medication Sig Dispense Refill    albuterol sulfate  (90 Base) MCG/ACT inhaler Inhale 2 puffs Every 4 (Four) Hours As Needed for Wheezing or Shortness of Air. 18 g 1    amphetamine-dextroamphetamine XR (Adderall XR) 10 MG 24 hr capsule Take 1 capsule by mouth Daily 30 capsule 0    buPROPion XL (WELLBUTRIN XL) 300 MG 24 hr tablet Take 1 tablet by mouth Daily. 90 tablet 0    DULoxetine (CYMBALTA) 60 MG capsule Take 2 capsules by mouth Daily. 180 capsule 0    montelukast (SINGULAIR) 10 MG tablet TAKE 1 TABLET BY MOUTH EVERY EVENING 90 tablet 3    traZODone (DESYREL) 50 MG tablet Take 1 tablet by mouth At Night As Needed for Sleep. 90 tablet 0     No current facility-administered medications for this visit.       Physical Exam   No physical exam completed today.     Assessment and Plan   Problem List Items Addressed This Visit          Mental Health    Generalized anxiety disorder (Chronic)    Relevant Medications    traZODone (DESYREL) 50 MG tablet    DULoxetine (CYMBALTA) 60 MG capsule    buPROPion XL (WELLBUTRIN XL) 300 MG 24 hr tablet    Moderate episode of recurrent major depressive disorder - Primary (Chronic)    Relevant Medications    traZODone (DESYREL) 50 MG tablet    DULoxetine (CYMBALTA) 60 MG capsule    buPROPion XL (WELLBUTRIN XL) 300 MG 24 hr tablet    ADHD (attention deficit hyperactivity disorder), inattentive type    Relevant Medications    traZODone (DESYREL) 50 MG tablet    DULoxetine (CYMBALTA) 60 MG capsule    buPROPion XL (WELLBUTRIN XL) 300 MG 24 hr tablet     Other Visit Diagnoses       Insomnia due to mental condition        Relevant Medications    traZODone (DESYREL) 50 MG tablet    DULoxetine (CYMBALTA) 60 MG capsule    buPROPion XL (WELLBUTRIN XL) 300 MG 24 hr tablet          Discussed treatment options with patient.  Patient is currently pleased with her medications.  Continue Cymbalta 120 mg daily  for depression and anxiety.  Continue Wellbutrin  mg daily for depression and ADHD.  Continue trazodone 50 mg nightly as needed for sleep.  Continue Adderall XR 10 mg daily for ADHD.  Patient last got her Adderall XR filled on September 19, so she will call when a refill is due.  We will see patient again in 6 weeks to reassess.  Encouraged patient to contact the office if she has any issues sooner.    TREATMENT PLAN/GOALS: Continue supportive psychotherapy efforts and medications as indicated. Treatment and medication options discussed during today's visit. Patient ackowledged and verbally consented to continue with current treatment plan and was educated on the importance of compliance with treatment and follow-up appointments.    DEPRESSION:  Patient screened positive for depression based on a PHQ-9 score of 6 on 10/2/2024. Follow-up recommendations include: Prescribed antidepressant medication treatment.       MEDICATION ISSUES:  We discussed risks, benefits, and side effects of the above medications and the patient was agreeable with the plan. Patient was educated on the importance of compliance with treatment and follow-up appointments.  Patient is agreeable to call the office with any worsening of symptoms or onset of side effects. Patient is agreeable to call 911 or go to the nearest ER should he/she begin having SI/HI.      Counseled patient regarding multimodal approach with healthy nutrition, healthy sleep, regular physical activity, social activities, counseling, and medications.      Coping skills reviewed and encouraged positive framing of thoughts     Assisted patient in processing above session content; acknowledged and normalized patient’s thoughts, feelings, and concerns.  Applied  positive coping skills and behavior management in session.  Allowed patient to freely discuss issues without interruption or judgment. Provided safe, confidential environment to facilitate the development of  positive therapeutic relationship and encourage open, honest communication. Assisted patient in identifying risk factors which would indicate the need for higher level of care including thoughts to harm self or others and/or self-harming behavior and encouraged patient to contact this office, call 911, or present to the nearest emergency room should any of these events occur. Discussed crisis intervention services and means to access. If patient has any concerns or needs assistance they were instructed to call the [facility] at [phone].    MEDS ORDERED DURING VISIT:  New Medications Ordered This Visit   Medications    traZODone (DESYREL) 50 MG tablet     Sig: Take 1 tablet by mouth At Night As Needed for Sleep.     Dispense:  90 tablet     Refill:  0    DULoxetine (CYMBALTA) 60 MG capsule     Sig: Take 2 capsules by mouth Daily.     Dispense:  180 capsule     Refill:  0    buPROPion XL (WELLBUTRIN XL) 300 MG 24 hr tablet     Sig: Take 1 tablet by mouth Daily.     Dispense:  90 tablet     Refill:  0         Follow Up   Return in about 6 weeks (around 11/13/2024) for Video visit.    Patient was given instructions and counseling regarding her condition or for health maintenance advice. Please see specific information pulled into the AVS if appropriate.         This document has been electronically signed by SHANNAN Dumont  October 2, 2024 13:21 EDT    Part of this note may be an electronic transcription/translation of spoken language to printed text using the Dragon Dictation System.

## 2024-10-15 ENCOUNTER — TRANSCRIBE ORDERS (OUTPATIENT)
Dept: CARDIOLOGY | Facility: HOSPITAL | Age: 54
End: 2024-10-15
Payer: COMMERCIAL

## 2024-10-15 ENCOUNTER — HOSPITAL ENCOUNTER (OUTPATIENT)
Dept: CARDIOLOGY | Facility: HOSPITAL | Age: 54
Discharge: HOME | End: 2024-10-15
Payer: COMMERCIAL

## 2024-10-15 ENCOUNTER — TRANSCRIBE ORDERS (OUTPATIENT)
Dept: LAB | Facility: HOSPITAL | Age: 54
End: 2024-10-15
Payer: COMMERCIAL

## 2024-10-15 ENCOUNTER — LAB (OUTPATIENT)
Dept: LAB | Facility: HOSPITAL | Age: 54
End: 2024-10-15
Payer: COMMERCIAL

## 2024-10-15 DIAGNOSIS — Z86.018: ICD-10-CM

## 2024-10-15 DIAGNOSIS — Z86.018: Primary | ICD-10-CM

## 2024-10-15 DIAGNOSIS — Z86.018 STATUS POST EXCISION OF ACOUSTIC NEUROMA: ICD-10-CM

## 2024-10-15 DIAGNOSIS — Z98.890 STATUS POST EXCISION OF ACOUSTIC NEUROMA: Primary | ICD-10-CM

## 2024-10-15 DIAGNOSIS — Z86.018 STATUS POST EXCISION OF ACOUSTIC NEUROMA: Primary | ICD-10-CM

## 2024-10-15 DIAGNOSIS — Z98.890 STATUS POST EXCISION OF ACOUSTIC NEUROMA: ICD-10-CM

## 2024-10-15 LAB
ANION GAP SERPL CALCULATED.3IONS-SCNC: 11.3 MMOL/L (ref 5–15)
BUN SERPL-MCNC: 18 MG/DL (ref 6–20)
BUN/CREAT SERPL: 20.7 (ref 7–25)
CALCIUM SPEC-SCNC: 9.5 MG/DL (ref 8.6–10.5)
CHLORIDE SERPL-SCNC: 104 MMOL/L (ref 98–107)
CO2 SERPL-SCNC: 26.7 MMOL/L (ref 22–29)
CREAT SERPL-MCNC: 0.87 MG/DL (ref 0.57–1)
DEPRECATED RDW RBC AUTO: 41.9 FL (ref 37–54)
EGFRCR SERPLBLD CKD-EPI 2021: 79.3 ML/MIN/1.73
ERYTHROCYTE [DISTWIDTH] IN BLOOD BY AUTOMATED COUNT: 12.6 % (ref 12.3–15.4)
GLUCOSE SERPL-MCNC: 82 MG/DL (ref 65–99)
HCT VFR BLD AUTO: 37.8 % (ref 34–46.6)
HGB BLD-MCNC: 13.1 G/DL (ref 12–15.9)
MCH RBC QN AUTO: 31.7 PG (ref 26.6–33)
MCHC RBC AUTO-ENTMCNC: 34.7 G/DL (ref 31.5–35.7)
MCV RBC AUTO: 91.5 FL (ref 79–97)
PLATELET # BLD AUTO: 287 10*3/MM3 (ref 140–450)
PMV BLD AUTO: 9.9 FL (ref 6–12)
POTASSIUM SERPL-SCNC: 4.6 MMOL/L (ref 3.5–5.2)
QT INTERVAL: 380 MS
QTC INTERVAL: 452 MS
RBC # BLD AUTO: 4.13 10*6/MM3 (ref 3.77–5.28)
SODIUM SERPL-SCNC: 142 MMOL/L (ref 136–145)
WBC NRBC COR # BLD AUTO: 6.38 10*3/MM3 (ref 3.4–10.8)

## 2024-10-15 PROCEDURE — 80048 BASIC METABOLIC PNL TOTAL CA: CPT

## 2024-10-15 PROCEDURE — 93005 ELECTROCARDIOGRAM TRACING: CPT | Performed by: COLON & RECTAL SURGERY

## 2024-10-15 PROCEDURE — 36415 COLL VENOUS BLD VENIPUNCTURE: CPT

## 2024-10-15 PROCEDURE — 85027 COMPLETE CBC AUTOMATED: CPT

## 2024-12-16 DIAGNOSIS — J45.20 MILD INTERMITTENT ASTHMA WITHOUT COMPLICATION: ICD-10-CM

## 2024-12-16 RX ORDER — MONTELUKAST SODIUM 10 MG/1
TABLET ORAL
Qty: 90 TABLET | Refills: 3 | Status: SHIPPED | OUTPATIENT
Start: 2024-12-16

## 2024-12-18 ENCOUNTER — TELEMEDICINE (OUTPATIENT)
Dept: PSYCHIATRY | Facility: CLINIC | Age: 54
End: 2024-12-18
Payer: COMMERCIAL

## 2024-12-18 DIAGNOSIS — F41.1 GENERALIZED ANXIETY DISORDER: Chronic | ICD-10-CM

## 2024-12-18 DIAGNOSIS — F51.05 INSOMNIA DUE TO MENTAL CONDITION: ICD-10-CM

## 2024-12-18 DIAGNOSIS — F90.0 ADHD (ATTENTION DEFICIT HYPERACTIVITY DISORDER), INATTENTIVE TYPE: ICD-10-CM

## 2024-12-18 DIAGNOSIS — F33.1 MODERATE EPISODE OF RECURRENT MAJOR DEPRESSIVE DISORDER: Chronic | ICD-10-CM

## 2024-12-18 RX ORDER — DULOXETIN HYDROCHLORIDE 60 MG/1
120 CAPSULE, DELAYED RELEASE ORAL DAILY
Qty: 180 CAPSULE | Refills: 0 | Status: SHIPPED | OUTPATIENT
Start: 2024-12-18

## 2024-12-18 RX ORDER — BUPROPION HYDROCHLORIDE 300 MG/1
300 TABLET ORAL DAILY
Qty: 90 TABLET | Refills: 0 | Status: SHIPPED | OUTPATIENT
Start: 2024-12-18

## 2024-12-18 RX ORDER — TRAZODONE HYDROCHLORIDE 50 MG/1
50 TABLET, FILM COATED ORAL NIGHTLY PRN
Qty: 90 TABLET | Refills: 0 | Status: SHIPPED | OUTPATIENT
Start: 2024-12-18

## 2024-12-18 RX ORDER — DEXTROAMPHETAMINE SACCHARATE, AMPHETAMINE ASPARTATE MONOHYDRATE, DEXTROAMPHETAMINE SULFATE AND AMPHETAMINE SULFATE 2.5; 2.5; 2.5; 2.5 MG/1; MG/1; MG/1; MG/1
10 CAPSULE, EXTENDED RELEASE ORAL DAILY
Qty: 30 CAPSULE | Refills: 0 | Status: SHIPPED | OUTPATIENT
Start: 2024-12-18

## 2024-12-18 NOTE — PROGRESS NOTES
This provider is located at [place], [place]. The Patient is seen remotely using Video. Patient is being seen via telehealth and confirm that they are in a secure environment for this session. Patient is located in [place], [place] at her home. The patient's condition being diagnosed/treated is appropriate for telemedicine. Provider identified as Ronny Christian as well as credentials APRN MSN PMHNP-BC.   The client/patient gave consent to be seen remotely, and when consent is given they understand that the consent allows for patient identifiable information to be sent to a third party as needed.  They may refuse to be seen remotely at any time. The electronic data is encrypted and password protected, and the patient has been advised of the potential risks to privacy not withstanding such measures.    Chief Complaint  Depression, Anxiety, ADHD, and Sleeping Problem    Subjective        Isabel Landrum presents to [facility] for   History of Present Illness  Patient seen today for follow-up visit for depression, anxiety, ADHD, and insomnia.  Patient reports that she is doing well.  States she did get 10 years at the [employer] and she is excited about that.  States it has been a long process.  States she will also be taking a sabbatical in the fall.  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  States she has got normal stress from the end of the semester, but she feels that it is manageable.  States she feels the medication is controlling her symptoms well.  Sleep is good with the help of trazodone.  Appetite is good.  States she has a conference to go to an [place] in early January.  States she has been out of the Adderall for about a month since she missed an appointment.  States she can tell a difference when she does not have the medication as it is helpful with her focus and attention symptoms.  Denies hopelessness. Denies suicidal or homicidal ideation.  Denies any manic type symptoms. Denies any paranoia. Denies any auditory of visual hallucinations. Denies any side effects to the medications.    Objective   Vital Signs:   There were no vitals taken for this visit.      Mental Status Exam:   Hygiene:   good  Cooperation:  Cooperative  Eye Contact:  Good  Psychomotor Behavior:  Appropriate  Affect:  Full range  Mood: normal  Speech:  Normal  Thought Process:  Goal directed  Thought Content:  Normal  Suicidal:  None  Homicidal:  None  Hallucinations:  None  Delusion:  None  Memory:  Intact  Orientation:  Person, Place, Time, and Situation  Reliability:  good  Insight:  Good  Judgement:  Good  Impulse Control:  Good  Physical/Medical Issues:  No      PHQ-9 Depression Screening  Little interest or pleasure in doing things? (Patient-Rptd) Several days   Feeling down, depressed, or hopeless? (Patient-Rptd) Several days   PHQ-2 Total Score (Patient-Rptd) 2   Trouble falling or staying asleep, or sleeping too much? (Patient-Rptd) Several days   Feeling tired or having little energy? (Patient-Rptd) Over half   Poor appetite or overeating? (Patient-Rptd) Several days   Feeling bad about yourself - or that you are a failure or have let yourself or your family down? (Patient-Rptd) Several days   Trouble concentrating on things, such as reading the newspaper or watching television? (Patient-Rptd) Several days   Moving or speaking so slowly that other people could have noticed? Or the opposite - being so fidgety or restless that you have been moving around a lot more than usual? (Patient-Rptd) Not at all   Thoughts that you would be better off dead, or of hurting yourself in some way? (Patient-Rptd) Not at all   PHQ-9 Total Score (Patient-Rptd) 8   If you checked off any problems, how difficult have these problems made it for you to do your work, take care of things at home, or get along with other people? (Patient-Rptd) Somewhat difficult         PHQ-9 Total Score:  (Patient-Rptd) 8     JOEL-7  Feeling nervous, anxious or on edge: (Patient-Rptd) Several days  Not being able to stop or control worrying: (Patient-Rptd) Several days  Worrying too much about different things: (Patient-Rptd) Several days  Trouble Relaxing: (Patient-Rptd) Several days  Being so restless that it is hard to sit still: (Patient-Rptd) Several days  Feeling afraid as if something awful might happen: (Patient-Rptd) Several days  Becoming easily annoyed or irritable: (Patient-Rptd) Several days  JOEL 7 Total Score: (Patient-Rptd) 7  If you checked any problems, how difficult have these problems made it for you to do your work, take care of things at home, or get along with other people: (Patient-Rptd) Somewhat difficult    [facility] request number [id number] reviewed by this APRN at today's encounter.    Previous Provider notes and available records reviewed by this APRN today.   Current Medications:   Current Outpatient Medications   Medication Sig Dispense Refill    albuterol sulfate  (90 Base) MCG/ACT inhaler Inhale 2 puffs Every 4 (Four) Hours As Needed for Wheezing or Shortness of Air. 18 g 1    amphetamine-dextroamphetamine XR (Adderall XR) 10 MG 24 hr capsule Take 1 capsule by mouth Daily 30 capsule 0    buPROPion XL (WELLBUTRIN XL) 300 MG 24 hr tablet Take 1 tablet by mouth Daily. 90 tablet 0    DULoxetine (CYMBALTA) 60 MG capsule Take 2 capsules by mouth Daily. 180 capsule 0    montelukast (SINGULAIR) 10 MG tablet TAKE 1 TABLET BY MOUTH EVERY EVENING 90 tablet 3    traZODone (DESYREL) 50 MG tablet Take 1 tablet by mouth At Night As Needed for Sleep. 90 tablet 0     No current facility-administered medications for this visit.       Physical Exam   Result Review :    The following data was reviewed by: SHANNAN Dumont on 12/18/2024:  Common labs          10/15/2024    10:40   Common Labs   Glucose 82    BUN 18    Creatinine 0.87    Sodium 142    Potassium 4.6    Chloride 104    Calcium 9.5     WBC 6.38    Hemoglobin 13.1    Hematocrit 37.8    Platelets 287         Assessment and Plan   Problem List Items Addressed This Visit          Mental Health    Generalized anxiety disorder (Chronic)    Relevant Medications    traZODone (DESYREL) 50 MG tablet    DULoxetine (CYMBALTA) 60 MG capsule    buPROPion XL (WELLBUTRIN XL) 300 MG 24 hr tablet    amphetamine-dextroamphetamine XR (Adderall XR) 10 MG 24 hr capsule    Moderate episode of recurrent major depressive disorder (Chronic)    Relevant Medications    traZODone (DESYREL) 50 MG tablet    DULoxetine (CYMBALTA) 60 MG capsule    buPROPion XL (WELLBUTRIN XL) 300 MG 24 hr tablet    amphetamine-dextroamphetamine XR (Adderall XR) 10 MG 24 hr capsule    ADHD (attention deficit hyperactivity disorder), inattentive type    Relevant Medications    traZODone (DESYREL) 50 MG tablet    DULoxetine (CYMBALTA) 60 MG capsule    buPROPion XL (WELLBUTRIN XL) 300 MG 24 hr tablet    amphetamine-dextroamphetamine XR (Adderall XR) 10 MG 24 hr capsule     Other Visit Diagnoses       Insomnia due to mental condition        Relevant Medications    traZODone (DESYREL) 50 MG tablet    DULoxetine (CYMBALTA) 60 MG capsule    buPROPion XL (WELLBUTRIN XL) 300 MG 24 hr tablet    amphetamine-dextroamphetamine XR (Adderall XR) 10 MG 24 hr capsule          Discussed treatment options with patient.  Patient is currently pleased with her medications.  Continue Cymbalta 120 mg daily for depression and anxiety.  Continue Wellbutrin  mg daily for depression.  Continue trazodone 50 mg nightly as needed for sleep.  Continue Adderall XR 10 mg daily for ADHD.  We will see patient again in 5 weeks to reassess.  Encouraged patient to contact the office if she has any issues sooner.    TREATMENT PLAN/GOALS: Continue supportive psychotherapy efforts and medications as indicated. Treatment and medication options discussed during today's visit. Patient ackowledged and verbally consented to continue  with current treatment plan and was educated on the importance of compliance with treatment and follow-up appointments.    DEPRESSION:  Patient screened positive for depression based on a PHQ-9 score of 8 on 12/16/2024. Follow-up recommendations include: Prescribed antidepressant medication treatment.       MEDICATION ISSUES:  We discussed risks, benefits, and side effects of the above medications and the patient was agreeable with the plan. Patient was educated on the importance of compliance with treatment and follow-up appointments.  Patient is agreeable to call the office with any worsening of symptoms or onset of side effects. Patient is agreeable to call 911 or go to the nearest ER should he/she begin having SI/HI.      Counseled patient regarding multimodal approach with healthy nutrition, healthy sleep, regular physical activity, social activities, counseling, and medications.      Coping skills reviewed and encouraged positive framing of thoughts     Assisted patient in processing above session content; acknowledged and normalized patient’s thoughts, feelings, and concerns.  Applied  positive coping skills and behavior management in session.  Allowed patient to freely discuss issues without interruption or judgment. Provided safe, confidential environment to facilitate the development of positive therapeutic relationship and encourage open, honest communication. Assisted patient in identifying risk factors which would indicate the need for higher level of care including thoughts to harm self or others and/or self-harming behavior and encouraged patient to contact this office, call 911, or present to the nearest emergency room should any of these events occur. Discussed crisis intervention services and means to access. If patient has any concerns or needs assistance they were instructed to call the [facility] at [phone].    MEDS ORDERED DURING VISIT:  New Medications Ordered  This Visit   Medications    traZODone (DESYREL) 50 MG tablet     Sig: Take 1 tablet by mouth At Night As Needed for Sleep.     Dispense:  90 tablet     Refill:  0    DULoxetine (CYMBALTA) 60 MG capsule     Sig: Take 2 capsules by mouth Daily.     Dispense:  180 capsule     Refill:  0    buPROPion XL (WELLBUTRIN XL) 300 MG 24 hr tablet     Sig: Take 1 tablet by mouth Daily.     Dispense:  90 tablet     Refill:  0    amphetamine-dextroamphetamine XR (Adderall XR) 10 MG 24 hr capsule     Sig: Take 1 capsule by mouth Daily     Dispense:  30 capsule     Refill:  0         Follow Up   Return in about 5 weeks (around 1/22/2025) for Video visit.    Patient was given instructions and counseling regarding her condition or for health maintenance advice. Please see specific information pulled into the AVS if appropriate.         This document has been electronically signed by SHANNAN Dumont  December 18, 2024 08:23 EST    Part of this note may be an electronic transcription/translation of spoken language to printed text using the Dragon Dictation System.

## 2025-01-23 ENCOUNTER — TELEMEDICINE (OUTPATIENT)
Dept: PSYCHIATRY | Facility: CLINIC | Age: 55
End: 2025-01-23
Payer: COMMERCIAL

## 2025-01-23 DIAGNOSIS — F51.05 INSOMNIA DUE TO MENTAL CONDITION: ICD-10-CM

## 2025-01-23 DIAGNOSIS — F33.1 MODERATE EPISODE OF RECURRENT MAJOR DEPRESSIVE DISORDER: Chronic | ICD-10-CM

## 2025-01-23 DIAGNOSIS — F41.1 GENERALIZED ANXIETY DISORDER: Chronic | ICD-10-CM

## 2025-01-23 DIAGNOSIS — F90.0 ADHD (ATTENTION DEFICIT HYPERACTIVITY DISORDER), INATTENTIVE TYPE: ICD-10-CM

## 2025-01-23 RX ORDER — BUPROPION HYDROCHLORIDE 300 MG/1
300 TABLET ORAL DAILY
Qty: 90 TABLET | Refills: 0 | Status: SHIPPED | OUTPATIENT
Start: 2025-01-23

## 2025-01-23 RX ORDER — TRAZODONE HYDROCHLORIDE 50 MG/1
50 TABLET, FILM COATED ORAL NIGHTLY PRN
Qty: 90 TABLET | Refills: 0 | Status: SHIPPED | OUTPATIENT
Start: 2025-01-23

## 2025-01-23 RX ORDER — DEXTROAMPHETAMINE SACCHARATE, AMPHETAMINE ASPARTATE MONOHYDRATE, DEXTROAMPHETAMINE SULFATE AND AMPHETAMINE SULFATE 2.5; 2.5; 2.5; 2.5 MG/1; MG/1; MG/1; MG/1
10 CAPSULE, EXTENDED RELEASE ORAL DAILY
Qty: 30 CAPSULE | Refills: 0 | Status: SHIPPED | OUTPATIENT
Start: 2025-01-23

## 2025-01-23 RX ORDER — DULOXETIN HYDROCHLORIDE 60 MG/1
120 CAPSULE, DELAYED RELEASE ORAL DAILY
Qty: 180 CAPSULE | Refills: 0 | Status: SHIPPED | OUTPATIENT
Start: 2025-01-23

## 2025-01-23 NOTE — PROGRESS NOTES
This provider is located at [place], [place]. The Patient is seen remotely using Video. Patient is being seen via telehealth and confirm that they are in a secure environment for this session. Patient is located in [place], [place] at her home. The patient's condition being diagnosed/treated is appropriate for telemedicine. Provider identified as Ronny Christian as well as credentials APRN MSN PMHNP-BC.   The client/patient gave consent to be seen remotely, and when consent is given they understand that the consent allows for patient identifiable information to be sent to a third party as needed.  They may refuse to be seen remotely at any time. The electronic data is encrypted and password protected, and the patient has been advised of the potential risks to privacy not withstanding such measures.    Chief Complaint  Depression, Anxiety, ADHD, and Sleeping Problem    Subjective        Isabel Landrum presents to [facility] for   History of Present Illness  Patient seen today for follow-up visit for depression, anxiety, ADHD, and insomnia.  Patient reports that she is doing well.  States she had a few days where she felt more anxious, but she was able to regroup and overcome.  She states most days are well.  Depression is under control.  States Adderall continues to help with her focus and attention symptoms.  Sleep and appetite are good. Denies hopelessness. Denies suicidal or homicidal ideation. Denies any manic type symptoms. Denies any paranoia. Denies any auditory of visual hallucinations. Denies any side effects to the medications.    Objective   Vital Signs:   There were no vitals taken for this visit.      Mental Status Exam:   Hygiene:   good  Cooperation:  Cooperative  Eye Contact:  Good  Psychomotor Behavior:  Appropriate  Affect:  Full range  Mood: normal  Speech:  Normal  Thought Process:  Goal directed  Thought Content:  Normal  Suicidal:  None  Homicidal:   None  Hallucinations:  None  Delusion:  None  Memory:  Intact  Orientation:  Person, Place, Time, and Situation  Reliability:  good  Insight:  Good  Judgement:  Good  Impulse Control:  Good  Physical/Medical Issues:  No      PHQ-9 Depression Screening  Little interest or pleasure in doing things? (Patient-Rptd) Several days   Feeling down, depressed, or hopeless? (Patient-Rptd) Several days   PHQ-2 Total Score (Patient-Rptd) 2   Trouble falling or staying asleep, or sleeping too much? (Patient-Rptd) Several days   Feeling tired or having little energy? (Patient-Rptd) Several days   Poor appetite or overeating? (Patient-Rptd) Several days   Feeling bad about yourself - or that you are a failure or have let yourself or your family down? (Patient-Rptd) Several days   Trouble concentrating on things, such as reading the newspaper or watching television? (Patient-Rptd) Several days   Moving or speaking so slowly that other people could have noticed? Or the opposite - being so fidgety or restless that you have been moving around a lot more than usual? (Patient-Rptd) Not at all   Thoughts that you would be better off dead, or of hurting yourself in some way? (Patient-Rptd) Not at all   PHQ-9 Total Score (Patient-Rptd) 7   If you checked off any problems, how difficult have these problems made it for you to do your work, take care of things at home, or get along with other people? (Patient-Rptd) Somewhat difficult         PHQ-9 Total Score: (Patient-Rptd) 7     JOEL-7  Feeling nervous, anxious or on edge: (Patient-Rptd) Several days  Not being able to stop or control worrying: (Patient-Rptd) Several days  Worrying too much about different things: (Patient-Rptd) Several days  Trouble Relaxing: (Patient-Rptd) Several days  Being so restless that it is hard to sit still: (Patient-Rptd) Several days  Feeling afraid as if something awful might happen: (Patient-Rptd) Several days  Becoming easily annoyed or irritable:  (Patient-Rptd) Several days  JOEL 7 Total Score: (Patient-Rptd) 7  If you checked any problems, how difficult have these problems made it for you to do your work, take care of things at home, or get along with other people: (Patient-Rptd) Somewhat difficult    [facility] request number [id number] reviewed by this APRN at today's encounter.    Previous Provider notes and available records reviewed by this APRN today.   Current Medications:   Current Outpatient Medications   Medication Sig Dispense Refill    amphetamine-dextroamphetamine XR (Adderall XR) 10 MG 24 hr capsule Take 1 capsule by mouth Daily 30 capsule 0    buPROPion XL (WELLBUTRIN XL) 300 MG 24 hr tablet Take 1 tablet by mouth Daily. 90 tablet 0    DULoxetine (CYMBALTA) 60 MG capsule Take 2 capsules by mouth Daily. 180 capsule 0    traZODone (DESYREL) 50 MG tablet Take 1 tablet by mouth At Night As Needed for Sleep. 90 tablet 0    albuterol sulfate  (90 Base) MCG/ACT inhaler Inhale 2 puffs Every 4 (Four) Hours As Needed for Wheezing or Shortness of Air. 18 g 1    montelukast (SINGULAIR) 10 MG tablet TAKE 1 TABLET BY MOUTH EVERY EVENING 90 tablet 3     No current facility-administered medications for this visit.       Physical Exam   Result Review :    The following data was reviewed by: SHANNAN Dumont on 01/23/2025:  Common labs          10/15/2024    10:40   Common Labs   Glucose 82    BUN 18    Creatinine 0.87    Sodium 142    Potassium 4.6    Chloride 104    Calcium 9.5    WBC 6.38    Hemoglobin 13.1    Hematocrit 37.8    Platelets 287         Assessment and Plan   Problem List Items Addressed This Visit          Mental Health    Generalized anxiety disorder (Chronic)    Relevant Medications    amphetamine-dextroamphetamine XR (Adderall XR) 10 MG 24 hr capsule    DULoxetine (CYMBALTA) 60 MG capsule    buPROPion XL (WELLBUTRIN XL) 300 MG 24 hr tablet    traZODone (DESYREL) 50 MG tablet    Moderate episode of recurrent major depressive disorder  (Chronic)    Relevant Medications    amphetamine-dextroamphetamine XR (Adderall XR) 10 MG 24 hr capsule    DULoxetine (CYMBALTA) 60 MG capsule    buPROPion XL (WELLBUTRIN XL) 300 MG 24 hr tablet    traZODone (DESYREL) 50 MG tablet    ADHD (attention deficit hyperactivity disorder), inattentive type    Relevant Medications    amphetamine-dextroamphetamine XR (Adderall XR) 10 MG 24 hr capsule    DULoxetine (CYMBALTA) 60 MG capsule    buPROPion XL (WELLBUTRIN XL) 300 MG 24 hr tablet    traZODone (DESYREL) 50 MG tablet     Other Visit Diagnoses       Insomnia due to mental condition        Relevant Medications    amphetamine-dextroamphetamine XR (Adderall XR) 10 MG 24 hr capsule    DULoxetine (CYMBALTA) 60 MG capsule    buPROPion XL (WELLBUTRIN XL) 300 MG 24 hr tablet    traZODone (DESYREL) 50 MG tablet          Discussed treatment options with patient.  Patient is currently pleased with the medications.  Continue Cymbalta 120 mg daily for depression and anxiety.  Continue Wellbutrin  mg daily for depression.  Continue Adderall XR 10 mg daily for ADHD.  Continue trazodone 50 mg nightly as needed for sleep.  We will see patient again in 8 weeks to reassess.  Encouraged patient to contact the office if she has any issues sooner.    TREATMENT PLAN/GOALS: Continue supportive psychotherapy efforts and medications as indicated. Treatment and medication options discussed during today's visit. Patient ackowledged and verbally consented to continue with current treatment plan and was educated on the importance of compliance with treatment and follow-up appointments.    DEPRESSION:  Patient screened positive for depression based on a PHQ-9 score of 7 on 1/23/2025. Follow-up recommendations include: Prescribed antidepressant medication treatment.       MEDICATION ISSUES:  We discussed risks, benefits, and side effects of the above medications and the patient was agreeable with the plan. Patient was educated on the importance  of compliance with treatment and follow-up appointments.  Patient is agreeable to call the office with any worsening of symptoms or onset of side effects. Patient is agreeable to call 911 or go to the nearest ER should he/she begin having SI/HI.      Counseled patient regarding multimodal approach with healthy nutrition, healthy sleep, regular physical activity, social activities, counseling, and medications.      Coping skills reviewed and encouraged positive framing of thoughts     Assisted patient in processing above session content; acknowledged and normalized patient’s thoughts, feelings, and concerns.  Applied  positive coping skills and behavior management in session.  Allowed patient to freely discuss issues without interruption or judgment. Provided safe, confidential environment to facilitate the development of positive therapeutic relationship and encourage open, honest communication. Assisted patient in identifying risk factors which would indicate the need for higher level of care including thoughts to harm self or others and/or self-harming behavior and encouraged patient to contact this office, call 911, or present to the nearest emergency room should any of these events occur. Discussed crisis intervention services and means to access. If patient has any concerns or needs assistance they were instructed to call the [facility] at [phone].    MEDS ORDERED DURING VISIT:  New Medications Ordered This Visit   Medications    amphetamine-dextroamphetamine XR (Adderall XR) 10 MG 24 hr capsule     Sig: Take 1 capsule by mouth Daily     Dispense:  30 capsule     Refill:  0    DULoxetine (CYMBALTA) 60 MG capsule     Sig: Take 2 capsules by mouth Daily.     Dispense:  180 capsule     Refill:  0    buPROPion XL (WELLBUTRIN XL) 300 MG 24 hr tablet     Sig: Take 1 tablet by mouth Daily.     Dispense:  90 tablet     Refill:  0    traZODone (DESYREL) 50 MG tablet     Sig: Take 1 tablet  by mouth At Night As Needed for Sleep.     Dispense:  90 tablet     Refill:  0         Follow Up   Return in about 8 weeks (around 3/20/2025) for Video visit.    Patient was given instructions and counseling regarding her condition or for health maintenance advice. Please see specific information pulled into the AVS if appropriate.         This document has been electronically signed by SHANNAN Dumont  January 23, 2025 10:20 EST    Part of this note may be an electronic transcription/translation of spoken language to printed text using the Dragon Dictation System.

## 2025-03-19 ENCOUNTER — TELEMEDICINE (OUTPATIENT)
Dept: PSYCHIATRY | Facility: CLINIC | Age: 55
End: 2025-03-19
Payer: COMMERCIAL

## 2025-03-19 DIAGNOSIS — F33.1 MODERATE EPISODE OF RECURRENT MAJOR DEPRESSIVE DISORDER: Primary | Chronic | ICD-10-CM

## 2025-03-19 DIAGNOSIS — F41.1 GENERALIZED ANXIETY DISORDER: Chronic | ICD-10-CM

## 2025-03-19 DIAGNOSIS — F90.0 ADHD (ATTENTION DEFICIT HYPERACTIVITY DISORDER), INATTENTIVE TYPE: ICD-10-CM

## 2025-03-19 DIAGNOSIS — F51.05 INSOMNIA DUE TO MENTAL CONDITION: ICD-10-CM

## 2025-03-19 RX ORDER — DEXTROAMPHETAMINE SACCHARATE, AMPHETAMINE ASPARTATE MONOHYDRATE, DEXTROAMPHETAMINE SULFATE AND AMPHETAMINE SULFATE 2.5; 2.5; 2.5; 2.5 MG/1; MG/1; MG/1; MG/1
10 CAPSULE, EXTENDED RELEASE ORAL DAILY
Qty: 30 CAPSULE | Refills: 0 | Status: SHIPPED | OUTPATIENT
Start: 2025-03-19

## 2025-03-19 RX ORDER — DULOXETIN HYDROCHLORIDE 60 MG/1
120 CAPSULE, DELAYED RELEASE ORAL DAILY
Qty: 180 CAPSULE | Refills: 0 | Status: SHIPPED | OUTPATIENT
Start: 2025-03-19

## 2025-03-19 RX ORDER — TRAZODONE HYDROCHLORIDE 50 MG/1
50 TABLET ORAL NIGHTLY PRN
Qty: 90 TABLET | Refills: 0 | Status: SHIPPED | OUTPATIENT
Start: 2025-03-19

## 2025-03-19 RX ORDER — BUPROPION HYDROCHLORIDE 300 MG/1
300 TABLET ORAL DAILY
Qty: 90 TABLET | Refills: 0 | Status: SHIPPED | OUTPATIENT
Start: 2025-03-19

## 2025-03-19 NOTE — PROGRESS NOTES
This provider is located at [place], [place]. The Patient is seen remotely using Video. Patient is being seen via telehealth and confirm that they are in a secure environment for this session. Patient is located in [place], [place] at her home. The patient's condition being diagnosed/treated is appropriate for telemedicine. Provider identified as Ronny Christian as well as credentials APRN MSN PMHNP-BC.   The client/patient gave consent to be seen remotely, and when consent is given they understand that the consent allows for patient identifiable information to be sent to a third party as needed.  They may refuse to be seen remotely at any time. The electronic data is encrypted and password protected, and the patient has been advised of the potential risks to privacy not withstanding such measures.    Chief Complaint  Depression, Anxiety, ADHD, and Sleeping Problem    Subjective        Isabel Landrum presents to [facility] for   History of Present Illness  Patient seen today for follow-up visit for depression, anxiety, ADHD, and insomnia.  Patient reports she is doing well.  States they have taken in her 's granddaughter.  She is 7 years old and will be raising her more than likely.  The child's mother has MS and some cognitive difficulties.  Patient states she is okay with that.  States she really enjoys the job being there.  States her depression and anxiety have been well-managed over the last couple months.  She has not had any issues.  States Adderall continues to help with her focus and attention symptoms.  States trazodone is helpful for sleep.  Appetite is good.  States she has had some increased stressors at work, and has been putting somefeelers out about other jobs, but overall she is pleased with work.  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 Denies hopelessness. Denies suicidal or homicidal ideation. Denies any manic type symptoms. Denies  any paranoia. Denies any auditory of visual hallucinations. Denies any side effects to the medications.    Objective   Vital Signs:   There were no vitals taken for this visit.      Mental Status Exam:   Hygiene:   good  Cooperation:  Cooperative  Eye Contact:  Good  Psychomotor Behavior:  Appropriate  Affect:  Full range  Mood: normal  Speech:  Normal  Thought Process:  Goal directed  Thought Content:  Normal  Suicidal:  None  Homicidal:  None  Hallucinations:  None  Delusion:  None  Memory:  Intact  Orientation:  Person, Place, Time, and Situation  Reliability:  good  Insight:  Good  Judgement:  Good  Impulse Control:  Good  Physical/Medical Issues:  No      PHQ-9 Depression Screening  Little interest or pleasure in doing things? (Patient-Rptd) Several days   Feeling down, depressed, or hopeless? (Patient-Rptd) Several days   PHQ-2 Total Score (Patient-Rptd) 2   Trouble falling or staying asleep, or sleeping too much? (Patient-Rptd) Several days   Feeling tired or having little energy? (Patient-Rptd) Several days   Poor appetite or overeating? (Patient-Rptd) Several days   Feeling bad about yourself - or that you are a failure or have let yourself or your family down? (Patient-Rptd) Several days   Trouble concentrating on things, such as reading the newspaper or watching television? (Patient-Rptd) Several days   Moving or speaking so slowly that other people could have noticed? Or the opposite - being so fidgety or restless that you have been moving around a lot more than usual? (Patient-Rptd) Not at all   Thoughts that you would be better off dead, or of hurting yourself in some way? (Patient-Rptd) Not at all   PHQ-9 Total Score (Patient-Rptd) 7   If you checked off any problems, how difficult have these problems made it for you to do your work, take care of things at home, or get along with other people? (Patient-Rptd) Somewhat difficult         PHQ-9 Total Score: (Patient-Rptd) 7     JOEL-7  Feeling nervous,  anxious or on edge: (Patient-Rptd) Several days  Not being able to stop or control worrying: (Patient-Rptd) Several days  Worrying too much about different things: (Patient-Rptd) Several days  Trouble Relaxing: (Patient-Rptd) Several days  Being so restless that it is hard to sit still: (Patient-Rptd) Not at all  Feeling afraid as if something awful might happen: (Patient-Rptd) Several days  Becoming easily annoyed or irritable: (Patient-Rptd) Several days  JOEL 7 Total Score: (Patient-Rptd) 6  If you checked any problems, how difficult have these problems made it for you to do your work, take care of things at home, or get along with other people: (Patient-Rptd) Somewhat difficult    [facility] request number [id number] reviewed by this APRN at today's encounter.    Previous Provider notes and available records reviewed by this APRN today.   Current Medications:   Current Outpatient Medications   Medication Sig Dispense Refill    amphetamine-dextroamphetamine XR (Adderall XR) 10 MG 24 hr capsule Take 1 capsule by mouth Daily 30 capsule 0    buPROPion XL (WELLBUTRIN XL) 300 MG 24 hr tablet Take 1 tablet by mouth Daily. 90 tablet 0    DULoxetine (CYMBALTA) 60 MG capsule Take 2 capsules by mouth Daily. 180 capsule 0    traZODone (DESYREL) 50 MG tablet Take 1 tablet by mouth At Night As Needed for Sleep. 90 tablet 0    albuterol sulfate  (90 Base) MCG/ACT inhaler Inhale 2 puffs Every 4 (Four) Hours As Needed for Wheezing or Shortness of Air. 18 g 1    montelukast (SINGULAIR) 10 MG tablet TAKE 1 TABLET BY MOUTH EVERY EVENING 90 tablet 3     No current facility-administered medications for this visit.       Physical Exam   Result Review :    The following data was reviewed by: SHANNAN Dumont on 03/19/2025:  Common labs          10/15/2024    10:40   Common Labs   Glucose 82    BUN 18    Creatinine 0.87    Sodium 142    Potassium 4.6    Chloride 104    Calcium 9.5    WBC 6.38    Hemoglobin 13.1    Hematocrit 37.8     Platelets 287         Assessment and Plan   Problem List Items Addressed This Visit          Mental Health    Generalized anxiety disorder (Chronic)    Relevant Medications    amphetamine-dextroamphetamine XR (Adderall XR) 10 MG 24 hr capsule    DULoxetine (CYMBALTA) 60 MG capsule    buPROPion XL (WELLBUTRIN XL) 300 MG 24 hr tablet    traZODone (DESYREL) 50 MG tablet    Moderate episode of recurrent major depressive disorder - Primary (Chronic)    Relevant Medications    amphetamine-dextroamphetamine XR (Adderall XR) 10 MG 24 hr capsule    DULoxetine (CYMBALTA) 60 MG capsule    buPROPion XL (WELLBUTRIN XL) 300 MG 24 hr tablet    traZODone (DESYREL) 50 MG tablet    ADHD (attention deficit hyperactivity disorder), inattentive type    Relevant Medications    amphetamine-dextroamphetamine XR (Adderall XR) 10 MG 24 hr capsule    DULoxetine (CYMBALTA) 60 MG capsule    buPROPion XL (WELLBUTRIN XL) 300 MG 24 hr tablet    traZODone (DESYREL) 50 MG tablet     Other Visit Diagnoses         Insomnia due to mental condition        Relevant Medications    amphetamine-dextroamphetamine XR (Adderall XR) 10 MG 24 hr capsule    DULoxetine (CYMBALTA) 60 MG capsule    buPROPion XL (WELLBUTRIN XL) 300 MG 24 hr tablet    traZODone (DESYREL) 50 MG tablet          Discussed treatment options with patient.  Patient is currently pleased with her medications.  Continue Cymbalta 120 mg daily for depression and anxiety.  Continue Wellbutrin  mg daily for depression.  Continue Adderall XR 10 mg daily for ADHD.  Continue trazodone 50 mg nightly as needed for sleep.  We will see patient again in 8 weeks to reassess.  Encouraged patient to contact the office if she has any issues sooner.    TREATMENT PLAN/GOALS: Continue supportive psychotherapy efforts and medications as indicated. Treatment and medication options discussed during today's visit. Patient ackowledged and verbally consented to continue with current treatment plan and was  educated on the importance of compliance with treatment and follow-up appointments.    DEPRESSION:  Patient screened positive for depression based on a PHQ-9 score of 7 on 3/17/2025. Follow-up recommendations include: Prescribed antidepressant medication treatment.       MEDICATION ISSUES:  We discussed risks, benefits, and side effects of the above medications and the patient was agreeable with the plan. Patient was educated on the importance of compliance with treatment and follow-up appointments.  Patient is agreeable to call the office with any worsening of symptoms or onset of side effects. Patient is agreeable to call 911 or go to the nearest ER should he/she begin having SI/HI.      Counseled patient regarding multimodal approach with healthy nutrition, healthy sleep, regular physical activity, social activities, counseling, and medications.      Coping skills reviewed and encouraged positive framing of thoughts     Assisted patient in processing above session content; acknowledged and normalized patient’s thoughts, feelings, and concerns.  Applied  positive coping skills and behavior management in session.  Allowed patient to freely discuss issues without interruption or judgment. Provided safe, confidential environment to facilitate the development of positive therapeutic relationship and encourage open, honest communication. Assisted patient in identifying risk factors which would indicate the need for higher level of care including thoughts to harm self or others and/or self-harming behavior and encouraged patient to contact this office, call 911, or present to the nearest emergency room should any of these events occur. Discussed crisis intervention services and means to access. If patient has any concerns or needs assistance they were instructed to call the [facility] at [phone].    MEDS ORDERED DURING VISIT:  New Medications Ordered This Visit   Medications     amphetamine-dextroamphetamine XR (Adderall XR) 10 MG 24 hr capsule     Sig: Take 1 capsule by mouth Daily     Dispense:  30 capsule     Refill:  0    DULoxetine (CYMBALTA) 60 MG capsule     Sig: Take 2 capsules by mouth Daily.     Dispense:  180 capsule     Refill:  0    buPROPion XL (WELLBUTRIN XL) 300 MG 24 hr tablet     Sig: Take 1 tablet by mouth Daily.     Dispense:  90 tablet     Refill:  0    traZODone (DESYREL) 50 MG tablet     Sig: Take 1 tablet by mouth At Night As Needed for Sleep.     Dispense:  90 tablet     Refill:  0         Follow Up   Return in about 8 weeks (around 5/14/2025) for Video visit.    Patient was given instructions and counseling regarding her condition or for health maintenance advice. Please see specific information pulled into the AVS if appropriate.         This document has been electronically signed by SHANNAN Dumont  March 19, 2025 10:24 EDT    Part of this note may be an electronic transcription/translation of spoken language to printed text using the Dragon Dictation System.

## 2025-04-25 ENCOUNTER — OFFICE VISIT (OUTPATIENT)
Dept: FAMILY MEDICINE CLINIC | Facility: CLINIC | Age: 55
End: 2025-04-25
Payer: COMMERCIAL

## 2025-04-25 VITALS
RESPIRATION RATE: 21 BRPM | OXYGEN SATURATION: 97 % | HEIGHT: 64 IN | TEMPERATURE: 98.6 F | HEART RATE: 91 BPM | DIASTOLIC BLOOD PRESSURE: 94 MMHG | BODY MASS INDEX: 37.05 KG/M2 | WEIGHT: 217 LBS | SYSTOLIC BLOOD PRESSURE: 122 MMHG

## 2025-04-25 DIAGNOSIS — J02.9 PHARYNGITIS, UNSPECIFIED ETIOLOGY: ICD-10-CM

## 2025-04-25 DIAGNOSIS — H10.9 CONJUNCTIVITIS OF LEFT EYE, UNSPECIFIED CONJUNCTIVITIS TYPE: ICD-10-CM

## 2025-04-25 DIAGNOSIS — R09.81 SINUS CONGESTION: Primary | ICD-10-CM

## 2025-04-25 DIAGNOSIS — J01.00 ACUTE NON-RECURRENT MAXILLARY SINUSITIS: ICD-10-CM

## 2025-04-25 DIAGNOSIS — R09.82 POSTNASAL DRIP: ICD-10-CM

## 2025-04-25 LAB
EXPIRATION DATE: NORMAL
FLUAV AG NPH QL: NEGATIVE
FLUBV AG NPH QL: NEGATIVE
INTERNAL CONTROL: NORMAL
Lab: NORMAL
S PYO AG THROAT QL: NEGATIVE
SARS-COV-2 AG UPPER RESP QL IA.RAPID: NOT DETECTED

## 2025-04-25 RX ORDER — ERYTHROMYCIN 5 MG/G
OINTMENT OPHTHALMIC EVERY 6 HOURS
Qty: 3.5 G | Refills: 0 | Status: SHIPPED | OUTPATIENT
Start: 2025-04-25

## 2025-04-25 RX ORDER — PREDNISONE 20 MG/1
40 TABLET ORAL DAILY
Qty: 10 TABLET | Refills: 0 | Status: SHIPPED | OUTPATIENT
Start: 2025-04-25 | End: 2025-04-30

## 2025-04-25 NOTE — PROGRESS NOTES
Follow Up Office Visit      Patient Name: Isabel Landrum  : 1970   MRN: [id number]     Chief Complaint:    Chief Complaint   Patient presents with    URI     Sore throat, cough, congestion, drainage started monday       History of Present Illness  The patient presents for evaluation of congestion, sore throat, and conjunctivitis.    Symptoms began on Monday and include congestion, a sore throat, and significant yellow drainage. Laryngitis is also reported, but no fever has been noted, although there has been perspiration. Coughing is not severe and is occasionally productive. No decongestants have been taken, as the illness has been allowed to run its course. Singulair is being continued. Wheezing, which often occurs when ill, has not been a significant issue during this episode. No gastrointestinal symptoms such as nausea, vomiting, or diarrhea are reported. Symptom management has included Mucinex, promethazine once or twice daily, Aleve for throat pain, and vitamin C drinks. Aleve appears to provide some relief.     Subjective      I have reviewed and the following portions of the patient's history were updated as appropriate: past family history, past medical history, past social history, past surgical history and problem list.    Medications:     Current Outpatient Medications:     albuterol sulfate  (90 Base) MCG/ACT inhaler, Inhale 2 puffs Every 4 (Four) Hours As Needed for Wheezing or Shortness of Air., Disp: 18 g, Rfl: 1    amphetamine-dextroamphetamine XR (Adderall XR) 10 MG 24 hr capsule, Take 1 capsule by mouth Daily, Disp: 30 capsule, Rfl: 0    buPROPion XL (WELLBUTRIN XL) 300 MG 24 hr tablet, Take 1 tablet by mouth Daily., Disp: 90 tablet, Rfl: 0    DULoxetine (CYMBALTA) 60 MG capsule, Take 2 capsules by mouth Daily., Disp: 180 capsule, Rfl: 0    montelukast (SINGULAIR) 10 MG tablet, TAKE 1 TABLET BY MOUTH EVERY EVENING, Disp: 90 tablet, Rfl: 3    traZODone (DESYREL) 50 MG  "tablet, Take 1 tablet by mouth At Night As Needed for Sleep., Disp: 90 tablet, Rfl: 0    amoxicillin-clavulanate (AUGMENTIN) 875-125 MG per tablet, Take 1 tablet by mouth 2 (Two) Times a Day., Disp: 14 tablet, Rfl: 0    erythromycin (ROMYCIN) 5 MG/GM ophthalmic ointment, Apply  to eye(s) as directed by provider Every 6 (Six) Hours., Disp: 3.5 g, Rfl: 0    predniSONE (DELTASONE) 20 MG tablet, Take 2 tablets by mouth Daily for 5 days., Disp: 10 tablet, Rfl: 0    Allergies:   Allergies   Allergen Reactions    Grass Itching       Objective     Physical Exam:   Physical Exam  Constitutional:       General: She is not in acute distress.     Appearance: She is not ill-appearing.   HENT:      Head: Normocephalic.      Right Ear: Tympanic membrane and ear canal normal.      Left Ear: Tympanic membrane and ear canal normal.      Nose: Congestion and rhinorrhea present.      Mouth/Throat:      Mouth: Mucous membranes are moist.      Pharynx: Posterior oropharyngeal erythema and postnasal drip present. No oropharyngeal exudate.   Eyes:      General:         Left eye: Discharge present.     Conjunctiva/sclera:      Left eye: Left conjunctiva is injected.      Pupils: Pupils are equal, round, and reactive to light.   Cardiovascular:      Rate and Rhythm: Normal rate and regular rhythm.      Heart sounds: No murmur heard.  Pulmonary:      Effort: Pulmonary effort is normal. No respiratory distress.      Breath sounds: No wheezing, rhonchi or rales.   Musculoskeletal:         General: Normal range of motion.   Skin:     General: Skin is warm.   Neurological:      General: No focal deficit present.      Mental Status: She is alert.   Psychiatric:         Mood and Affect: Mood normal.         Vital Signs:   Vitals:    04/25/25 1708   BP: 122/94   Pulse: 91   Resp: 21   Temp: 98.6 °F (37 °C)   TempSrc: Temporal   SpO2: 97%   Weight: 98.4 kg (217 lb)   Height: 162.6 cm (64.02\")     Body mass index is 37.23 " kg/m².      Procedures    Assessment / Plan         Assessment and Plan     Diagnoses and all orders for this visit:    1. Sinus congestion (Primary)  -     POCT SARS-CoV-2 Antigen KARYN  -     POCT Influenza A/B  -     POCT rapid strep A    2. Pharyngitis, unspecified etiology    3. Postnasal drip    4. Acute non-recurrent maxillary sinusitis  -     amoxicillin-clavulanate (AUGMENTIN) 875-125 MG per tablet; Take 1 tablet by mouth 2 (Two) Times a Day.  Dispense: 14 tablet; Refill: 0  -     predniSONE (DELTASONE) 20 MG tablet; Take 2 tablets by mouth Daily for 5 days.  Dispense: 10 tablet; Refill: 0    5. Conjunctivitis of left eye, unspecified conjunctivitis type  -     erythromycin (ROMYCIN) 5 MG/GM ophthalmic ointment; Apply  to eye(s) as directed by provider Every 6 (Six) Hours.  Dispense: 3.5 g; Refill: 0    Point-of-care testing negative for flu, strep, COVID.  Signs and symptoms concerning for secondary bacterial infection.  Encouraged to continue with supportive and symptomatic treatment.  Will begin Augmentin and short course of prednisone.  Erythromycin ointment prescribed for conjunctivitis.  Encouraged to return to the clinic if symptoms persist or worsen.         Follow Up:   Return if symptoms worsen or fail to improve.    Patient or patient representative verbalized consent for the use of Ambient Listening during the visit with  Lary Hardy PA-C for chart documentation. 4/28/2025  17:11 EDT    Lary Hardy PA-C    [facility] [place]

## 2025-05-14 ENCOUNTER — TELEMEDICINE (OUTPATIENT)
Dept: PSYCHIATRY | Facility: CLINIC | Age: 55
End: 2025-05-14
Payer: COMMERCIAL

## 2025-05-14 DIAGNOSIS — F51.05 INSOMNIA DUE TO MENTAL CONDITION: ICD-10-CM

## 2025-05-14 DIAGNOSIS — F33.1 MODERATE EPISODE OF RECURRENT MAJOR DEPRESSIVE DISORDER: Chronic | ICD-10-CM

## 2025-05-14 DIAGNOSIS — F90.0 ADHD (ATTENTION DEFICIT HYPERACTIVITY DISORDER), INATTENTIVE TYPE: ICD-10-CM

## 2025-05-14 DIAGNOSIS — F41.1 GENERALIZED ANXIETY DISORDER: Chronic | ICD-10-CM

## 2025-05-14 RX ORDER — DULOXETIN HYDROCHLORIDE 60 MG/1
120 CAPSULE, DELAYED RELEASE ORAL DAILY
Qty: 180 CAPSULE | Refills: 0 | Status: SHIPPED | OUTPATIENT
Start: 2025-05-14

## 2025-05-14 RX ORDER — TRAZODONE HYDROCHLORIDE 50 MG/1
50 TABLET ORAL NIGHTLY PRN
Qty: 90 TABLET | Refills: 0 | Status: SHIPPED | OUTPATIENT
Start: 2025-05-14

## 2025-05-14 RX ORDER — BUPROPION HYDROCHLORIDE 300 MG/1
300 TABLET ORAL DAILY
Qty: 90 TABLET | Refills: 0 | Status: SHIPPED | OUTPATIENT
Start: 2025-05-14

## 2025-05-14 RX ORDER — DEXTROAMPHETAMINE SACCHARATE, AMPHETAMINE ASPARTATE MONOHYDRATE, DEXTROAMPHETAMINE SULFATE AND AMPHETAMINE SULFATE 2.5; 2.5; 2.5; 2.5 MG/1; MG/1; MG/1; MG/1
10 CAPSULE, EXTENDED RELEASE ORAL DAILY
Qty: 30 CAPSULE | Refills: 0 | Status: SHIPPED | OUTPATIENT
Start: 2025-05-14

## 2025-05-14 NOTE — PROGRESS NOTES
This provider is located at [place], [place]. The Patient is seen remotely using Video. Patient is being seen via telehealth and confirm that they are in a secure environment for this session. Patient is located in [place], [place] at her home. The patient's condition being diagnosed/treated is appropriate for telemedicine. Provider identified as Ronny Christian as well as credentials APRN MSN PMHNP-BC.   The client/patient gave consent to be seen remotely, and when consent is given they understand that the consent allows for patient identifiable information to be sent to a third party as needed.  They may refuse to be seen remotely at any time. The electronic data is encrypted and password protected, and the patient has been advised of the potential risks to privacy not withstanding such measures.    Chief Complaint  Depression, Anxiety, ADHD, and Sleeping Problem    Subjective        Isabel Landrum presents to [facility] for   History of Present Illness  Patient seen today for follow-up visit for depression, anxiety, ADHD, and insomnia.  Patient reports she is doing okay.  States that she feels she is experiencing a little more depression.  She feels it is most likely situational.  States she has not been exercising and not ending the way that she should.  States she feels more tired and maybe has some apathy.  Discussed with patient that we could adjust her medications, but she states she is not ready to do that.  States she would like to make some changes on her own to see how she does.  States anxiety has been manageable.  States Adderall continues to control her focus and attention symptoms.  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  States she has a trip planned to [place] in the summer for a conference with her old supervisor.  States she also has a trip planned  with her kids later in summer.  Sleep is fair.  Appetite is good. Denies hopelessness. Denies suicidal or homicidal ideation. Denies any manic type symptoms. Denies any paranoia. Denies any auditory of visual hallucinations. Denies any side effects to the medications.    Objective   Vital Signs:   There were no vitals taken for this visit.      Mental Status Exam:   Hygiene:   good  Cooperation:  Cooperative  Eye Contact:  Good  Psychomotor Behavior:  Appropriate  Affect:  Full range  Mood: normal  Speech:  Normal  Thought Process:  Goal directed  Thought Content:  Normal  Suicidal:  None  Homicidal:  None  Hallucinations:  None  Delusion:  None  Memory:  Intact  Orientation:  Person, Place, Time, and Situation  Reliability:  good  Insight:  Good  Judgement:  Good  Impulse Control:  Good  Physical/Medical Issues:  No      PHQ-9 Depression Screening  Little interest or pleasure in doing things? (Patient-Rptd) Several days   Feeling down, depressed, or hopeless? (Patient-Rptd) Over half   PHQ-2 Total Score (Patient-Rptd) 3   Trouble falling or staying asleep, or sleeping too much? (Patient-Rptd) Over half   Feeling tired or having little energy? (Patient-Rptd) Over half   Poor appetite or overeating? (Patient-Rptd) Over half   Feeling bad about yourself - or that you are a failure or have let yourself or your family down? (Patient-Rptd) Several days   Trouble concentrating on things, such as reading the newspaper or watching television? (Patient-Rptd) Several days   Moving or speaking so slowly that other people could have noticed? Or the opposite - being so fidgety or restless that you have been moving around a lot more than usual? (Patient-Rptd) Not at all   Thoughts that you would be better off dead, or of hurting yourself in some way? (Patient-Rptd) Not at all   PHQ-9 Total Score (Patient-Rptd) 11   If you checked off any problems, how difficult have these problems made it for you to do your work, take care of  things at home, or get along with other people? (Patient-Rptd) Very difficult         PHQ-9 Total Score: (Patient-Rptd) 11     JOEL-7  Feeling nervous, anxious or on edge: (Patient-Rptd) Several days  Not being able to stop or control worrying: (Patient-Rptd) Several days  Worrying too much about different things: (Patient-Rptd) Several days  Trouble Relaxing: (Patient-Rptd) Several days  Being so restless that it is hard to sit still: (Patient-Rptd) Not at all  Feeling afraid as if something awful might happen: (Patient-Rptd) Several days  Becoming easily annoyed or irritable: (Patient-Rptd) Several days  JOEL 7 Total Score: (Patient-Rptd) 6  If you checked any problems, how difficult have these problems made it for you to do your work, take care of things at home, or get along with other people: (Patient-Rptd) Somewhat difficult    PDMP reviewed    Previous Provider notes and available records reviewed by this APRN today.   Current Medications:   Current Outpatient Medications   Medication Sig Dispense Refill    albuterol sulfate  (90 Base) MCG/ACT inhaler Inhale 2 puffs Every 4 (Four) Hours As Needed for Wheezing or Shortness of Air. 18 g 1    amoxicillin-clavulanate (AUGMENTIN) 875-125 MG per tablet Take 1 tablet by mouth 2 (Two) Times a Day. 14 tablet 0    amphetamine-dextroamphetamine XR (Adderall XR) 10 MG 24 hr capsule Take 1 capsule by mouth Daily 30 capsule 0    buPROPion XL (WELLBUTRIN XL) 300 MG 24 hr tablet Take 1 tablet by mouth Daily. 90 tablet 0    DULoxetine (CYMBALTA) 60 MG capsule Take 2 capsules by mouth Daily. 180 capsule 0    erythromycin (ROMYCIN) 5 MG/GM ophthalmic ointment Apply  to eye(s) as directed by provider Every 6 (Six) Hours. 3.5 g 0    montelukast (SINGULAIR) 10 MG tablet TAKE 1 TABLET BY MOUTH EVERY EVENING 90 tablet 3    traZODone (DESYREL) 50 MG tablet Take 1 tablet by mouth At Night As Needed for Sleep. 90 tablet 0     No current facility-administered medications for this  visit.       Physical Exam   Result Review :    The following data was reviewed by: SHANNAN uDmont on 05/14/2025:  Common labs          10/15/2024    10:40   Common Labs   Glucose 82    BUN 18    Creatinine 0.87    Sodium 142    Potassium 4.6    Chloride 104    Calcium 9.5    WBC 6.38    Hemoglobin 13.1    Hematocrit 37.8    Platelets 287         Assessment and Plan   Problem List Items Addressed This Visit          Mental Health    Generalized anxiety disorder (Chronic)    Relevant Medications    amphetamine-dextroamphetamine XR (Adderall XR) 10 MG 24 hr capsule    buPROPion XL (WELLBUTRIN XL) 300 MG 24 hr tablet    DULoxetine (CYMBALTA) 60 MG capsule    traZODone (DESYREL) 50 MG tablet    Moderate episode of recurrent major depressive disorder (Chronic)    Relevant Medications    amphetamine-dextroamphetamine XR (Adderall XR) 10 MG 24 hr capsule    buPROPion XL (WELLBUTRIN XL) 300 MG 24 hr tablet    DULoxetine (CYMBALTA) 60 MG capsule    traZODone (DESYREL) 50 MG tablet    ADHD (attention deficit hyperactivity disorder), inattentive type    Relevant Medications    amphetamine-dextroamphetamine XR (Adderall XR) 10 MG 24 hr capsule    buPROPion XL (WELLBUTRIN XL) 300 MG 24 hr tablet    DULoxetine (CYMBALTA) 60 MG capsule    traZODone (DESYREL) 50 MG tablet     Other Visit Diagnoses         Insomnia due to mental condition        Relevant Medications    amphetamine-dextroamphetamine XR (Adderall XR) 10 MG 24 hr capsule    buPROPion XL (WELLBUTRIN XL) 300 MG 24 hr tablet    DULoxetine (CYMBALTA) 60 MG capsule    traZODone (DESYREL) 50 MG tablet          Discussed treatment options with patient.  Patient would like to remain on her current medications for another couple of months.  Encouraged patient to call if symptoms got worse prior to her next appointment.  Patient agreeable.  Continue Cymbalta 120 mg daily for depression and anxiety.  Continue Wellbutrin  mg daily for depression.  Continue trazodone  50 mg nightly as needed for sleep.  Continue Adderall XR 10 mg daily for ADHD.  We will see patient again in 8 weeks to reassess.  Encouraged patient to contact the office if she has any issues sooner.    TREATMENT PLAN/GOALS: Continue supportive psychotherapy efforts and medications as indicated. Treatment and medication options discussed during today's visit. Patient ackowledged and verbally consented to continue with current treatment plan and was educated on the importance of compliance with treatment and follow-up appointments.    DEPRESSION:  Patient screened positive for depression based on a PHQ-9 score of 11 on 5/14/2025. Follow-up recommendations include: Prescribed antidepressant medication treatment.       MEDICATION ISSUES:  We discussed risks, benefits, and side effects of the above medications and the patient was agreeable with the plan. Patient was educated on the importance of compliance with treatment and follow-up appointments.  Patient is agreeable to call the office with any worsening of symptoms or onset of side effects. Patient is agreeable to call 911 or go to the nearest ER should he/she begin having SI/HI.      Counseled patient regarding multimodal approach with healthy nutrition, healthy sleep, regular physical activity, social activities, counseling, and medications.      Coping skills reviewed and encouraged positive framing of thoughts     Assisted patient in processing above session content; acknowledged and normalized patient’s thoughts, feelings, and concerns.  Applied  positive coping skills and behavior management in session.  Allowed patient to freely discuss issues without interruption or judgment. Provided safe, confidential environment to facilitate the development of positive therapeutic relationship and encourage open, honest communication. Assisted patient in identifying risk factors which would indicate the need for higher level of care including thoughts to harm self or  others and/or self-harming behavior and encouraged patient to contact this office, call 911, or present to the nearest emergency room should any of these events occur. Discussed crisis intervention services and means to access. If patient has any concerns or needs assistance they were instructed to call the [facility] at [phone].    MEDS ORDERED DURING VISIT:  New Medications Ordered This Visit   Medications    amphetamine-dextroamphetamine XR (Adderall XR) 10 MG 24 hr capsule     Sig: Take 1 capsule by mouth Daily     Dispense:  30 capsule     Refill:  0    buPROPion XL (WELLBUTRIN XL) 300 MG 24 hr tablet     Sig: Take 1 tablet by mouth Daily.     Dispense:  90 tablet     Refill:  0    DULoxetine (CYMBALTA) 60 MG capsule     Sig: Take 2 capsules by mouth Daily.     Dispense:  180 capsule     Refill:  0    traZODone (DESYREL) 50 MG tablet     Sig: Take 1 tablet by mouth At Night As Needed for Sleep.     Dispense:  90 tablet     Refill:  0         Follow Up   Return in about 8 weeks (around 7/9/2025) for Video visit.    Patient was given instructions and counseling regarding her condition or for health maintenance advice. Please see specific information pulled into the AVS if appropriate.         This document has been electronically signed by SHANNAN Dumont  May 14, 2025 10:15 EDT    Part of this note may be an electronic transcription/translation of spoken language to printed text using the Dragon Dictation System.

## 2025-06-17 DIAGNOSIS — F33.1 MODERATE EPISODE OF RECURRENT MAJOR DEPRESSIVE DISORDER: Chronic | ICD-10-CM

## 2025-06-17 DIAGNOSIS — F51.05 INSOMNIA DUE TO MENTAL CONDITION: ICD-10-CM

## 2025-06-17 DIAGNOSIS — F41.1 GENERALIZED ANXIETY DISORDER: Chronic | ICD-10-CM

## 2025-07-16 ENCOUNTER — TELEMEDICINE (OUTPATIENT)
Dept: PSYCHIATRY | Facility: CLINIC | Age: 55
End: 2025-07-16
Payer: COMMERCIAL

## 2025-07-16 DIAGNOSIS — F41.1 GENERALIZED ANXIETY DISORDER: Chronic | ICD-10-CM

## 2025-07-16 DIAGNOSIS — F51.05 INSOMNIA DUE TO MENTAL CONDITION: ICD-10-CM

## 2025-07-16 DIAGNOSIS — F33.1 MODERATE EPISODE OF RECURRENT MAJOR DEPRESSIVE DISORDER: Chronic | ICD-10-CM

## 2025-07-16 DIAGNOSIS — F90.0 ADHD (ATTENTION DEFICIT HYPERACTIVITY DISORDER), INATTENTIVE TYPE: ICD-10-CM

## 2025-07-16 RX ORDER — DEXTROAMPHETAMINE SACCHARATE, AMPHETAMINE ASPARTATE MONOHYDRATE, DEXTROAMPHETAMINE SULFATE AND AMPHETAMINE SULFATE 2.5; 2.5; 2.5; 2.5 MG/1; MG/1; MG/1; MG/1
10 CAPSULE, EXTENDED RELEASE ORAL DAILY
Qty: 30 CAPSULE | Refills: 0 | Status: SHIPPED | OUTPATIENT
Start: 2025-07-16

## 2025-07-16 RX ORDER — DULOXETIN HYDROCHLORIDE 60 MG/1
120 CAPSULE, DELAYED RELEASE ORAL DAILY
Qty: 180 CAPSULE | Refills: 0 | OUTPATIENT
Start: 2025-07-16

## 2025-07-16 RX ORDER — BUPROPION HYDROCHLORIDE 300 MG/1
300 TABLET ORAL DAILY
Qty: 90 TABLET | Refills: 0 | Status: SHIPPED | OUTPATIENT
Start: 2025-07-16

## 2025-07-16 RX ORDER — DULOXETIN HYDROCHLORIDE 60 MG/1
120 CAPSULE, DELAYED RELEASE ORAL DAILY
Qty: 180 CAPSULE | Refills: 0 | Status: SHIPPED | OUTPATIENT
Start: 2025-07-16

## 2025-07-16 RX ORDER — TRAZODONE HYDROCHLORIDE 50 MG/1
50 TABLET ORAL NIGHTLY PRN
Qty: 90 TABLET | Refills: 0 | Status: SHIPPED | OUTPATIENT
Start: 2025-07-16

## 2025-07-16 RX ORDER — TRAZODONE HYDROCHLORIDE 50 MG/1
50 TABLET ORAL NIGHTLY PRN
Qty: 90 TABLET | Refills: 0 | OUTPATIENT
Start: 2025-07-16

## 2025-07-16 NOTE — PROGRESS NOTES
This provider is located at [place], [place]. The Patient is seen remotely using Video. Patient is being seen via telehealth and confirm that they are in a secure environment for this session. Patient is located in [place], [place] at her home. The patient's condition being diagnosed/treated is appropriate for telemedicine. Provider identified as Ronny Christian as well as credentials APRN MSN PMHNP-BC.   The client/patient gave consent to be seen remotely, and when consent is given they understand that the consent allows for patient identifiable information to be sent to a third party as needed.  They may refuse to be seen remotely at any time. The electronic data is encrypted and password protected, and the patient has been advised of the potential risks to privacy not withstanding such measures.    Chief Complaint  Depression, Anxiety, ADHD, and Sleeping Problem    Subjective        Isabel Landrum presents to [facility] for   History of Present Illness  Patient seen today for a follow-up visit for depression, anxiety, ADHD, and insomnia.  Patient reports that overall she has been doing well.  States they have had a lot of stress with taking care of her 's grandchild.  States that her  has not been reacting well to the stress.  States he has took up drinking more.  She is urging him to get some help.  She states this is just a situational thing.  She states overall her medications are controlling her depression and anxiety symptoms well.  States she is able to rationally think through when she has something going on.  States Adderall continues to control her focus and attention symptoms.  States she has only used it about half a month to give herself a break from it during the summer.  She has an upcoming trip to [place] in September and is excited about that.  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  Sleep and appetite are  good. Denies hopelessness. Denies suicidal or homicidal ideation. Denies any manic type symptoms. Denies any paranoia. Denies any auditory of visual hallucinations. Denies any side effects to the medications.    Objective   Vital Signs:   There were no vitals taken for this visit.      Mental Status Exam:   Hygiene:   good  Cooperation:  Cooperative  Eye Contact:  Good  Psychomotor Behavior:  Appropriate  Affect:  Full range  Mood: normal  Speech:  Normal  Thought Process:  Goal directed  Thought Content:  Normal  Suicidal:  None  Homicidal:  None  Hallucinations:  None  Delusion:  None  Memory:  Intact  Orientation:  Person, Place, Time, and Situation  Reliability:  good  Insight:  Good  Judgement:  Good  Impulse Control:  Good  Physical/Medical Issues:  No      PHQ-9 Depression Screening  Little interest or pleasure in doing things? (Patient-Rptd) Several days   Feeling down, depressed, or hopeless? (Patient-Rptd) Several days   PHQ-2 Total Score (Patient-Rptd) 2   Trouble falling or staying asleep, or sleeping too much? (Patient-Rptd) Several days   Feeling tired or having little energy? (Patient-Rptd) Over half   Poor appetite or overeating? (Patient-Rptd) Over half   Feeling bad about yourself - or that you are a failure or have let yourself or your family down? (Patient-Rptd) Several days   Trouble concentrating on things, such as reading the newspaper or watching television? (Patient-Rptd) Several days   Moving or speaking so slowly that other people could have noticed? Or the opposite - being so fidgety or restless that you have been moving around a lot more than usual? (Patient-Rptd) Not at all   Thoughts that you would be better off dead, or of hurting yourself in some way? (Patient-Rptd) Not at all   PHQ-9 Total Score (Patient-Rptd) 9   If you checked off any problems, how difficult have these problems made it for you to do your work, take care of things at home, or get along with other people?  (Patient-Rptd) Somewhat difficult         PHQ-9 Total Score: (Patient-Rptd) 9     JOEL-7  Feeling nervous, anxious or on edge: (Patient-Rptd) Several days  Not being able to stop or control worrying: (Patient-Rptd) Several days  Worrying too much about different things: (Patient-Rptd) Several days  Trouble Relaxing: (Patient-Rptd) Several days  Being so restless that it is hard to sit still: (Patient-Rptd) Not at all  Feeling afraid as if something awful might happen: (Patient-Rptd) Several days  Becoming easily annoyed or irritable: (Patient-Rptd) Several days  JOEL 7 Total Score: (Patient-Rptd) 6  If you checked any problems, how difficult have these problems made it for you to do your work, take care of things at home, or get along with other people: (Patient-Rptd) Somewhat difficult    PDMP reviewed    Previous Provider notes and available records reviewed by this APRN today.   Current Medications:   Current Outpatient Medications   Medication Sig Dispense Refill    albuterol sulfate  (90 Base) MCG/ACT inhaler Inhale 2 puffs Every 4 (Four) Hours As Needed for Wheezing or Shortness of Air. 18 g 1    amoxicillin-clavulanate (AUGMENTIN) 875-125 MG per tablet Take 1 tablet by mouth 2 (Two) Times a Day. 14 tablet 0    amphetamine-dextroamphetamine XR (Adderall XR) 10 MG 24 hr capsule Take 1 capsule by mouth Daily 30 capsule 0    buPROPion XL (WELLBUTRIN XL) 300 MG 24 hr tablet Take 1 tablet by mouth Daily. 90 tablet 0    DULoxetine (CYMBALTA) 60 MG capsule Take 2 capsules by mouth Daily. 180 capsule 0    erythromycin (ROMYCIN) 5 MG/GM ophthalmic ointment Apply  to eye(s) as directed by provider Every 6 (Six) Hours. 3.5 g 0    montelukast (SINGULAIR) 10 MG tablet TAKE 1 TABLET BY MOUTH EVERY EVENING 90 tablet 3    traZODone (DESYREL) 50 MG tablet Take 1 tablet by mouth At Night As Needed for Sleep. 90 tablet 0     No current facility-administered medications for this visit.       Physical Exam   Result Review  :    The following data was reviewed by: SHANNAN Dumont on 07/16/2025:  Common labs          10/15/2024    10:40   Common Labs   Glucose 82    BUN 18    Creatinine 0.87    Sodium 142    Potassium 4.6    Chloride 104    Calcium 9.5    WBC 6.38    Hemoglobin 13.1    Hematocrit 37.8    Platelets 287         Assessment and Plan   Problem List Items Addressed This Visit          Mental Health    Generalized anxiety disorder (Chronic)    Relevant Medications    buPROPion XL (WELLBUTRIN XL) 300 MG 24 hr tablet    amphetamine-dextroamphetamine XR (Adderall XR) 10 MG 24 hr capsule    DULoxetine (CYMBALTA) 60 MG capsule    traZODone (DESYREL) 50 MG tablet    Moderate episode of recurrent major depressive disorder (Chronic)    Relevant Medications    buPROPion XL (WELLBUTRIN XL) 300 MG 24 hr tablet    amphetamine-dextroamphetamine XR (Adderall XR) 10 MG 24 hr capsule    DULoxetine (CYMBALTA) 60 MG capsule    traZODone (DESYREL) 50 MG tablet    ADHD (attention deficit hyperactivity disorder), inattentive type    Relevant Medications    buPROPion XL (WELLBUTRIN XL) 300 MG 24 hr tablet    amphetamine-dextroamphetamine XR (Adderall XR) 10 MG 24 hr capsule    DULoxetine (CYMBALTA) 60 MG capsule    traZODone (DESYREL) 50 MG tablet     Other Visit Diagnoses         Insomnia due to mental condition        Relevant Medications    buPROPion XL (WELLBUTRIN XL) 300 MG 24 hr tablet    amphetamine-dextroamphetamine XR (Adderall XR) 10 MG 24 hr capsule    DULoxetine (CYMBALTA) 60 MG capsule    traZODone (DESYREL) 50 MG tablet          Discussed treatment options with patient.  Patient is currently pleased with her medications.  Continue Cymbalta 120 mg daily for depression and anxiety.  Continue Wellbutrin  mg daily for depression.  Continue Adderall XR 10 mg daily for ADHD.  Continue trazodone 50 mg nightly as needed for sleep.  Will see patient again in 8 weeks to reassess.  Encouraged patient to contact the office if she has  any issues sooner.    TREATMENT PLAN/GOALS: Continue supportive psychotherapy efforts and medications as indicated. Treatment and medication options discussed during today's visit. Patient ackowledged and verbally consented to continue with current treatment plan and was educated on the importance of compliance with treatment and follow-up appointments.    DEPRESSION:  Patient screened positive for depression based on a PHQ-9 score of 9 on 7/16/2025. Follow-up recommendations include: Prescribed antidepressant medication treatment.       MEDICATION ISSUES:  We discussed risks, benefits, and side effects of the above medications and the patient was agreeable with the plan. Patient was educated on the importance of compliance with treatment and follow-up appointments.  Patient is agreeable to call the office with any worsening of symptoms or onset of side effects. Patient is agreeable to call 911 or go to the nearest ER should he/she begin having SI/HI.      Counseled patient regarding multimodal approach with healthy nutrition, healthy sleep, regular physical activity, social activities, counseling, and medications.      Coping skills reviewed and encouraged positive framing of thoughts     Assisted patient in processing above session content; acknowledged and normalized patient’s thoughts, feelings, and concerns.  Applied  positive coping skills and behavior management in session.  Allowed patient to freely discuss issues without interruption or judgment. Provided safe, confidential environment to facilitate the development of positive therapeutic relationship and encourage open, honest communication. Assisted patient in identifying risk factors which would indicate the need for higher level of care including thoughts to harm self or others and/or self-harming behavior and encouraged patient to contact this office, call 911, or present to the nearest emergency room should any of these events occur. Discussed crisis  intervention services and means to access. If patient has any concerns or needs assistance they were instructed to call the [facility] at [phone].    MEDS ORDERED DURING VISIT:  New Medications Ordered This Visit   Medications    buPROPion XL (WELLBUTRIN XL) 300 MG 24 hr tablet     Sig: Take 1 tablet by mouth Daily.     Dispense:  90 tablet     Refill:  0    amphetamine-dextroamphetamine XR (Adderall XR) 10 MG 24 hr capsule     Sig: Take 1 capsule by mouth Daily     Dispense:  30 capsule     Refill:  0    DULoxetine (CYMBALTA) 60 MG capsule     Sig: Take 2 capsules by mouth Daily.     Dispense:  180 capsule     Refill:  0    traZODone (DESYREL) 50 MG tablet     Sig: Take 1 tablet by mouth At Night As Needed for Sleep.     Dispense:  90 tablet     Refill:  0         Follow Up   Return in about 8 weeks (around 9/10/2025) for Video visit.    Patient was given instructions and counseling regarding her condition or for health maintenance advice. Please see specific information pulled into the AVS if appropriate.         This document has been electronically signed by SHANNAN Dumont  July 16, 2025 10:28 EDT    Part of this note may be an electronic transcription/translation of spoken language to printed text using the Dragon Dictation System.